# Patient Record
Sex: FEMALE | Race: BLACK OR AFRICAN AMERICAN | Employment: UNEMPLOYED | URBAN - METROPOLITAN AREA
[De-identification: names, ages, dates, MRNs, and addresses within clinical notes are randomized per-mention and may not be internally consistent; named-entity substitution may affect disease eponyms.]

---

## 2017-10-25 ENCOUNTER — OFFICE VISIT (OUTPATIENT)
Dept: PULMONOLOGY | Age: 52
End: 2017-10-25

## 2017-10-25 VITALS
TEMPERATURE: 97.5 F | OXYGEN SATURATION: 92 % | HEIGHT: 62 IN | SYSTOLIC BLOOD PRESSURE: 118 MMHG | BODY MASS INDEX: 45.82 KG/M2 | WEIGHT: 249 LBS | HEART RATE: 93 BPM | DIASTOLIC BLOOD PRESSURE: 70 MMHG

## 2017-10-25 DIAGNOSIS — Z72.0 TOBACCO ABUSE: ICD-10-CM

## 2017-10-25 DIAGNOSIS — E66.01 MORBID OBESITY (HCC): ICD-10-CM

## 2017-10-25 DIAGNOSIS — Z99.89 OSA ON CPAP: ICD-10-CM

## 2017-10-25 DIAGNOSIS — G47.33 OSA ON CPAP: ICD-10-CM

## 2017-10-25 DIAGNOSIS — J44.9 CHRONIC OBSTRUCTIVE PULMONARY DISEASE, UNSPECIFIED COPD TYPE (HCC): Primary | ICD-10-CM

## 2017-10-25 PROBLEM — G47.10 HYPERSOMNIA: Status: ACTIVE | Noted: 2017-10-25

## 2017-10-25 PROCEDURE — G8484 FLU IMMUNIZE NO ADMIN: HCPCS | Performed by: INTERNAL MEDICINE

## 2017-10-25 PROCEDURE — G8427 DOCREV CUR MEDS BY ELIG CLIN: HCPCS | Performed by: INTERNAL MEDICINE

## 2017-10-25 PROCEDURE — 4004F PT TOBACCO SCREEN RCVD TLK: CPT | Performed by: INTERNAL MEDICINE

## 2017-10-25 PROCEDURE — G8926 SPIRO NO PERF OR DOC: HCPCS | Performed by: INTERNAL MEDICINE

## 2017-10-25 PROCEDURE — 3023F SPIROM DOC REV: CPT | Performed by: INTERNAL MEDICINE

## 2017-10-25 PROCEDURE — G8417 CALC BMI ABV UP PARAM F/U: HCPCS | Performed by: INTERNAL MEDICINE

## 2017-10-25 PROCEDURE — 3014F SCREEN MAMMO DOC REV: CPT | Performed by: INTERNAL MEDICINE

## 2017-10-25 PROCEDURE — 3017F COLORECTAL CA SCREEN DOC REV: CPT | Performed by: INTERNAL MEDICINE

## 2017-10-25 PROCEDURE — 99214 OFFICE O/P EST MOD 30 MIN: CPT | Performed by: INTERNAL MEDICINE

## 2017-10-25 RX ORDER — AZITHROMYCIN 250 MG/1
TABLET, FILM COATED ORAL
COMMUNITY
Start: 2017-10-24 | End: 2018-02-28

## 2017-10-25 RX ORDER — MELATONIN
COMMUNITY
Start: 2017-10-10

## 2017-10-25 ASSESSMENT — ENCOUNTER SYMPTOMS
NAUSEA: 0
VOMITING: 0
WHEEZING: 1
CHEST TIGHTNESS: 0
SHORTNESS OF BREATH: 1
EYE DISCHARGE: 0
RHINORRHEA: 0
TROUBLE SWALLOWING: 0
EYE ITCHING: 0
ABDOMINAL PAIN: 0
SINUS PRESSURE: 0
DIARRHEA: 0
SORE THROAT: 0
VOICE CHANGE: 0
COUGH: 1

## 2017-10-25 NOTE — PROGRESS NOTES
Subjective:     Lucho Servin is a 46 y.o. female who complains today of:     Chief Complaint   Patient presents with    Follow-up     copd lewis       HPI  Patient is using CPAP with  13    centimeters of H2O with heated humidity. with 2.5 lit O2. Patient is using CPAP for about    5-7  a hours every night. Patient is using CPAP with full face   mask  Patient said  sleep is restful with the CPAP use. No snoring with CPAP use  No early morning headache. No complaint of daytime sleepiness or tiredness  Patient denies taking naps. Patient denies difficulty falling asleep or staying asleep. Patient is using proair HFA 2 puff QID prn   C/o shortness of breath , worse with exertion. Occasional Wheezing   C/o  Cough with  Dark yellow Sputum x2 days. Seen by PCP by [facility] and send for antibiotics. No Hemoptysis  No Chest pain or pleuritic pain  No Fever or chills. C/o Rhinorrhea or postnasal drip. Allergies:  Pcn [penicillins] and Sulfa antibiotics  Past Medical History:   Diagnosis Date    Depression     Depression     GERD (gastroesophageal reflux disease)     Hypercholesteremia     Hypertension     Hypothyroidism     LEWIS on CPAP      Past Surgical History:   Procedure Laterality Date    CARDIAC CATHETERIZATION      CHOLECYSTECTOMY      CYST REMOVAL      JOINT REPLACEMENT      KNEE CARTILAGE SURGERY      TUBAL LIGATION       Family History   Problem Relation Age of Onset    Heart Failure Mother      Social History     Social History    Marital status:      Spouse name: N/A    Number of children: N/A    Years of education: N/A     Occupational History    Not on file.      Social History Main Topics    Smoking status: Current Every Day Smoker     Packs/day: 0.50     Years: 30.00    Smokeless tobacco: Never Used    Alcohol use Not on file    Drug use: No    Sexual activity: Yes     Other Topics Concern    Not on file     Social History Narrative    No narrative on file tenderness. There is no rebound. Musculoskeletal: Normal range of motion. She exhibits no edema. Lymphadenopathy:     She has no cervical adenopathy. Neurological: She is alert and oriented to person, place, and time. Coordination normal.   Skin: Skin is warm and dry. She is not diaphoretic. Psychiatric: She has a normal mood and affect. Current Outpatient Prescriptions   Medication Sig Dispense Refill    Cholecalciferol (VITAMIN D3) 1000 units TABS       azithromycin (ZITHROMAX) 250 MG tablet       metFORMIN (GLUCOPHAGE) 500 MG tablet       CPAP Machine MISC by Does not apply route      OXYGEN Inhale into the lungs      triamcinolone (KENALOG) 0.1 % ointment Apply  topically 2 times daily. Apply topically 2 times daily.  ammonium lactate (AMLACTIN) 12 % cream Apply  topically as needed. Apply topically as needed.  omeprazole (PRILOSEC) 20 MG capsule Take 20 mg by mouth daily.  meloxicam (MOBIC) 7.5 MG tablet Take 7.5 mg by mouth daily.  buPROPion (WELLBUTRIN XL) 300 MG XL tablet Take 300 mg by mouth every morning.  cyclobenzaprine (FLEXERIL) 5 MG tablet Take 5 mg by mouth 3 times daily as needed.  lisinopril-hydrochlorothiazide (ZESTORETIC) 20-12.5 MG per tablet Take 1 tablet by mouth daily.  aripiprazole (ABILIFY) 10 MG tablet Take 10 mg by mouth daily.  carvedilol (COREG) 25 MG tablet Take 25 mg by mouth 2 times daily (with meals).  diltiazem (CARDIZEM SR) 120 MG SR capsule Take 120 mg by mouth 2 times daily.  ibuprofen (ADVIL;MOTRIN) 800 MG tablet Take 800 mg by mouth every 6 hours as needed.  lansoprazole (PREVACID) 30 MG capsule Take 30 mg by mouth daily.  levothyroxine (SYNTHROID) 200 MCG injection Inject 50 mcg into the vein every morning (before breakfast).  lisinopril (PRINIVIL;ZESTRIL) 20 MG tablet Take 20 mg by mouth daily.  sertraline (ZOLOFT) 100 MG tablet Take 100 mg by mouth daily.         potassium chloride SA (K-DUR;KLOR-CON) 20 MEQ tablet Take 20 mEq by mouth 2 times daily.  oxybutynin (DITROPAN) 5 MG tablet Take 5 mg by mouth 3 times daily.  sertraline (ZOLOFT) 100 MG tablet Take 100 mg by mouth daily.  simvastatin (ZOCOR) 20 MG tablet Take 20 mg by mouth nightly. No current facility-administered medications for this visit. Assessment/Plan:     1. ALMA on CPAP  Patient is using CPAP with  13    centimeters of H2O with heated humidity. with 2.5 lit O2. Patient is using CPAP for about    5-7  a hours every night. Patient is using CPAP with full face   mask  Patient said  sleep is restful with the CPAP use. continue CPAP and O2 as before. Counseling: CPAP/BiPAP uses, patient advised to use CPAP at least 5-6 hours every night. Sleep hygiene: He avoid supine sleep, sleep on her sides. Avoid  sleep deprivation. Explained sleep hygiene. Advice to avoid Alcohol and sedative      2. Chronic obstructive pulmonary disease, unspecified COPD type ([address]  She  Is on proair HFA 2 puff QID prn , CXR and PFT requested for further evaluation. - XR CHEST STANDARD (2 VW); Future  - FULL PFT STUDY WITH PRE AND POST; Future    3. Tobacco abuse  Patient advised try to quit smoking. Risks related to smoking explained to the patient. 4. Morbid obesity ([address]  Patient patient is advised try to lose weight. obesity related risk explained to the patient ,  Current weight:  249 lb (112.9 kg) Lbs. BMI:  Body mass index is 45.54 kg/m². Return in about 3 months (around 1/25/2018) for COPD, CPAP f/u, hypoxia on O2.       Ryan Hill MD

## 2017-12-20 ENCOUNTER — HOSPITAL ENCOUNTER (OUTPATIENT)
Dept: GENERAL RADIOLOGY | Age: 52
Discharge: HOME | End: 2017-12-20
Payer: MEDICAID

## 2017-12-20 ENCOUNTER — HOSPITAL ENCOUNTER (OUTPATIENT)
Dept: PULMONOLOGY | Age: 52
Discharge: HOME | End: 2017-12-20
Payer: MEDICAID

## 2017-12-20 DIAGNOSIS — J44.9 CHRONIC OBSTRUCTIVE PULMONARY DISEASE, UNSPECIFIED COPD TYPE (HCC): ICD-10-CM

## 2017-12-20 PROCEDURE — 94010 BREATHING CAPACITY TEST: CPT

## 2017-12-20 PROCEDURE — 71020 XR CHEST STANDARD TWO VW: CPT

## 2017-12-20 PROCEDURE — 94726 PLETHYSMOGRAPHY LUNG VOLUMES: CPT

## 2017-12-20 PROCEDURE — 94729 DIFFUSING CAPACITY: CPT

## 2017-12-21 PROCEDURE — 94010 BREATHING CAPACITY TEST: CPT | Performed by: INTERNAL MEDICINE

## 2017-12-21 PROCEDURE — 94729 DIFFUSING CAPACITY: CPT | Performed by: INTERNAL MEDICINE

## 2017-12-21 PROCEDURE — 94726 PLETHYSMOGRAPHY LUNG VOLUMES: CPT | Performed by: INTERNAL MEDICINE

## 2018-02-28 ENCOUNTER — OFFICE VISIT (OUTPATIENT)
Dept: PULMONOLOGY | Age: 53
End: 2018-02-28
Payer: MEDICAID

## 2018-02-28 VITALS
SYSTOLIC BLOOD PRESSURE: 120 MMHG | HEIGHT: 62 IN | TEMPERATURE: 97.9 F | BODY MASS INDEX: 48.69 KG/M2 | HEART RATE: 67 BPM | DIASTOLIC BLOOD PRESSURE: 62 MMHG | OXYGEN SATURATION: 95 % | WEIGHT: 264.6 LBS

## 2018-02-28 DIAGNOSIS — G47.33 OSA ON CPAP: Primary | ICD-10-CM

## 2018-02-28 DIAGNOSIS — E66.01 MORBID OBESITY (HCC): ICD-10-CM

## 2018-02-28 DIAGNOSIS — K21.9 GASTROESOPHAGEAL REFLUX DISEASE WITHOUT ESOPHAGITIS: ICD-10-CM

## 2018-02-28 DIAGNOSIS — Z99.89 OSA ON CPAP: Primary | ICD-10-CM

## 2018-02-28 DIAGNOSIS — R09.02 HYPOXEMIA: ICD-10-CM

## 2018-02-28 DIAGNOSIS — J44.9 CHRONIC OBSTRUCTIVE PULMONARY DISEASE, UNSPECIFIED COPD TYPE (HCC): ICD-10-CM

## 2018-02-28 PROCEDURE — 4004F PT TOBACCO SCREEN RCVD TLK: CPT | Performed by: INTERNAL MEDICINE

## 2018-02-28 PROCEDURE — G8484 FLU IMMUNIZE NO ADMIN: HCPCS | Performed by: INTERNAL MEDICINE

## 2018-02-28 PROCEDURE — G8427 DOCREV CUR MEDS BY ELIG CLIN: HCPCS | Performed by: INTERNAL MEDICINE

## 2018-02-28 PROCEDURE — G8417 CALC BMI ABV UP PARAM F/U: HCPCS | Performed by: INTERNAL MEDICINE

## 2018-02-28 PROCEDURE — G8926 SPIRO NO PERF OR DOC: HCPCS | Performed by: INTERNAL MEDICINE

## 2018-02-28 PROCEDURE — 3017F COLORECTAL CA SCREEN DOC REV: CPT | Performed by: INTERNAL MEDICINE

## 2018-02-28 PROCEDURE — 99214 OFFICE O/P EST MOD 30 MIN: CPT | Performed by: INTERNAL MEDICINE

## 2018-02-28 PROCEDURE — 3023F SPIROM DOC REV: CPT | Performed by: INTERNAL MEDICINE

## 2018-02-28 PROCEDURE — 3014F SCREEN MAMMO DOC REV: CPT | Performed by: INTERNAL MEDICINE

## 2018-02-28 ASSESSMENT — ENCOUNTER SYMPTOMS
EYE ITCHING: 0
VOMITING: 0
EYE DISCHARGE: 0
RHINORRHEA: 0
ABDOMINAL PAIN: 0
SHORTNESS OF BREATH: 1
WHEEZING: 1
CHEST TIGHTNESS: 0
DIARRHEA: 0
SINUS PRESSURE: 0
SORE THROAT: 0
NAUSEA: 0
TROUBLE SWALLOWING: 0
COUGH: 0
VOICE CHANGE: 0

## 2018-04-02 ENCOUNTER — HOSPITAL ENCOUNTER (OUTPATIENT)
Dept: SLEEP CENTER | Age: 53
Discharge: HOME | End: 2018-04-04
Payer: MEDICAID

## 2018-04-02 PROCEDURE — 95811 POLYSOM 6/>YRS CPAP 4/> PARM: CPT

## 2018-06-26 ENCOUNTER — OFFICE VISIT (OUTPATIENT)
Dept: PULMONOLOGY | Age: 53
End: 2018-06-26
Payer: MEDICAID

## 2018-06-26 VITALS
HEIGHT: 62 IN | SYSTOLIC BLOOD PRESSURE: 124 MMHG | BODY MASS INDEX: 49.8 KG/M2 | WEIGHT: 270.6 LBS | TEMPERATURE: 97 F | OXYGEN SATURATION: 98 % | HEART RATE: 70 BPM | DIASTOLIC BLOOD PRESSURE: 64 MMHG

## 2018-06-26 DIAGNOSIS — J44.9 CHRONIC OBSTRUCTIVE PULMONARY DISEASE, UNSPECIFIED COPD TYPE (HCC): ICD-10-CM

## 2018-06-26 DIAGNOSIS — R09.02 HYPOXIA: ICD-10-CM

## 2018-06-26 DIAGNOSIS — G47.33 OSA ON CPAP: Primary | ICD-10-CM

## 2018-06-26 DIAGNOSIS — Z99.89 OSA ON CPAP: Primary | ICD-10-CM

## 2018-06-26 DIAGNOSIS — E66.01 MORBID OBESITY (HCC): ICD-10-CM

## 2018-06-26 PROCEDURE — G8427 DOCREV CUR MEDS BY ELIG CLIN: HCPCS | Performed by: INTERNAL MEDICINE

## 2018-06-26 PROCEDURE — G8417 CALC BMI ABV UP PARAM F/U: HCPCS | Performed by: INTERNAL MEDICINE

## 2018-06-26 PROCEDURE — 3023F SPIROM DOC REV: CPT | Performed by: INTERNAL MEDICINE

## 2018-06-26 PROCEDURE — 3017F COLORECTAL CA SCREEN DOC REV: CPT | Performed by: INTERNAL MEDICINE

## 2018-06-26 PROCEDURE — 4004F PT TOBACCO SCREEN RCVD TLK: CPT | Performed by: INTERNAL MEDICINE

## 2018-06-26 PROCEDURE — G8926 SPIRO NO PERF OR DOC: HCPCS | Performed by: INTERNAL MEDICINE

## 2018-06-26 PROCEDURE — 99214 OFFICE O/P EST MOD 30 MIN: CPT | Performed by: INTERNAL MEDICINE

## 2018-06-26 ASSESSMENT — ENCOUNTER SYMPTOMS
NAUSEA: 0
DIARRHEA: 0
VOICE CHANGE: 0
RHINORRHEA: 0
WHEEZING: 0
VOMITING: 0
COUGH: 0
SORE THROAT: 0
CHEST TIGHTNESS: 0
ABDOMINAL PAIN: 0
SINUS PRESSURE: 0
SHORTNESS OF BREATH: 0
EYE DISCHARGE: 0
TROUBLE SWALLOWING: 0
EYE ITCHING: 0

## 2018-08-09 ENCOUNTER — TELEPHONE (OUTPATIENT)
Dept: PULMONOLOGY | Age: 53
End: 2018-08-09

## 2018-08-09 NOTE — TELEPHONE ENCOUNTER
PATIENT IS REQUESTING A SCRIPT BE SENT TO [facility], [place] FOR HEATED TUBING FOR HER CPAP MACHINE. PATIENT STATES NO MATTER WHAT SETTING HER CPAP IS ON SHE WAKES UP WITH A VERY DRY NOSE.

## 2018-12-20 ENCOUNTER — OFFICE VISIT (OUTPATIENT)
Dept: PULMONOLOGY | Age: 53
End: 2018-12-20
Payer: MEDICAID

## 2018-12-20 VITALS
DIASTOLIC BLOOD PRESSURE: 60 MMHG | BODY MASS INDEX: 49.13 KG/M2 | OXYGEN SATURATION: 93 % | SYSTOLIC BLOOD PRESSURE: 128 MMHG | TEMPERATURE: 97.4 F | WEIGHT: 267 LBS | HEIGHT: 62 IN | RESPIRATION RATE: 16 BRPM | HEART RATE: 73 BPM

## 2018-12-20 DIAGNOSIS — G47.33 OSA ON CPAP: Primary | ICD-10-CM

## 2018-12-20 DIAGNOSIS — Z99.89 OSA ON CPAP: Primary | ICD-10-CM

## 2018-12-20 DIAGNOSIS — E66.01 MORBID OBESITY (HCC): ICD-10-CM

## 2018-12-20 DIAGNOSIS — J44.9 CHRONIC OBSTRUCTIVE PULMONARY DISEASE, UNSPECIFIED COPD TYPE (HCC): ICD-10-CM

## 2018-12-20 DIAGNOSIS — F17.200 SMOKING: ICD-10-CM

## 2018-12-20 PROCEDURE — 3017F COLORECTAL CA SCREEN DOC REV: CPT | Performed by: INTERNAL MEDICINE

## 2018-12-20 PROCEDURE — G8417 CALC BMI ABV UP PARAM F/U: HCPCS | Performed by: INTERNAL MEDICINE

## 2018-12-20 PROCEDURE — G8484 FLU IMMUNIZE NO ADMIN: HCPCS | Performed by: INTERNAL MEDICINE

## 2018-12-20 PROCEDURE — G8926 SPIRO NO PERF OR DOC: HCPCS | Performed by: INTERNAL MEDICINE

## 2018-12-20 PROCEDURE — 4004F PT TOBACCO SCREEN RCVD TLK: CPT | Performed by: INTERNAL MEDICINE

## 2018-12-20 PROCEDURE — 99214 OFFICE O/P EST MOD 30 MIN: CPT | Performed by: INTERNAL MEDICINE

## 2018-12-20 PROCEDURE — 3023F SPIROM DOC REV: CPT | Performed by: INTERNAL MEDICINE

## 2018-12-20 PROCEDURE — G8427 DOCREV CUR MEDS BY ELIG CLIN: HCPCS | Performed by: INTERNAL MEDICINE

## 2018-12-20 RX ORDER — ALBUTEROL SULFATE 90 UG/1
2 AEROSOL, METERED RESPIRATORY (INHALATION) EVERY 6 HOURS PRN
Qty: 1 INHALER | Refills: 3 | Status: SHIPPED | OUTPATIENT
Start: 2018-12-20 | End: 2020-01-09 | Stop reason: SDUPTHER

## 2018-12-20 ASSESSMENT — ENCOUNTER SYMPTOMS
COUGH: 0
NAUSEA: 0
DIARRHEA: 0
CHEST TIGHTNESS: 0
ABDOMINAL PAIN: 0
SINUS PRESSURE: 0
WHEEZING: 1
TROUBLE SWALLOWING: 0
SORE THROAT: 0
RHINORRHEA: 0
VOMITING: 0
EYE ITCHING: 0
VOICE CHANGE: 0
EYE DISCHARGE: 0
SHORTNESS OF BREATH: 1

## 2019-04-19 ENCOUNTER — TELEPHONE (OUTPATIENT)
Dept: PULMONOLOGY | Age: 54
End: 2019-04-19

## 2019-06-17 ENCOUNTER — OFFICE VISIT (OUTPATIENT)
Dept: PULMONOLOGY | Age: 54
End: 2019-06-17
Payer: MEDICAID

## 2019-06-17 ENCOUNTER — TELEPHONE (OUTPATIENT)
Dept: PULMONOLOGY | Age: 54
End: 2019-06-17

## 2019-06-17 VITALS
BODY MASS INDEX: 49.69 KG/M2 | HEART RATE: 71 BPM | RESPIRATION RATE: 16 BRPM | TEMPERATURE: 96.2 F | HEIGHT: 62 IN | DIASTOLIC BLOOD PRESSURE: 64 MMHG | SYSTOLIC BLOOD PRESSURE: 126 MMHG | OXYGEN SATURATION: 93 % | WEIGHT: 270 LBS

## 2019-06-17 DIAGNOSIS — F17.200 SMOKING: ICD-10-CM

## 2019-06-17 DIAGNOSIS — R09.02 HYPOXIA: ICD-10-CM

## 2019-06-17 DIAGNOSIS — E66.01 MORBID OBESITY (HCC): ICD-10-CM

## 2019-06-17 DIAGNOSIS — Z99.89 OSA ON CPAP: Primary | ICD-10-CM

## 2019-06-17 DIAGNOSIS — G47.33 OSA ON CPAP: Primary | ICD-10-CM

## 2019-06-17 PROCEDURE — G8427 DOCREV CUR MEDS BY ELIG CLIN: HCPCS | Performed by: INTERNAL MEDICINE

## 2019-06-17 PROCEDURE — 3017F COLORECTAL CA SCREEN DOC REV: CPT | Performed by: INTERNAL MEDICINE

## 2019-06-17 PROCEDURE — G8417 CALC BMI ABV UP PARAM F/U: HCPCS | Performed by: INTERNAL MEDICINE

## 2019-06-17 PROCEDURE — 4004F PT TOBACCO SCREEN RCVD TLK: CPT | Performed by: INTERNAL MEDICINE

## 2019-06-17 PROCEDURE — 99214 OFFICE O/P EST MOD 30 MIN: CPT | Performed by: INTERNAL MEDICINE

## 2019-06-17 RX ORDER — LISINOPRIL 20 MG/1
TABLET ORAL
COMMUNITY
Start: 2019-06-01

## 2019-06-17 RX ORDER — ASPIRIN 81 MG/1
TABLET, CHEWABLE ORAL
COMMUNITY
Start: 2019-05-28

## 2019-06-17 ASSESSMENT — ENCOUNTER SYMPTOMS
ABDOMINAL PAIN: 0
WHEEZING: 1
COUGH: 0
CHEST TIGHTNESS: 0
SHORTNESS OF BREATH: 1
SINUS PRESSURE: 0
VOICE CHANGE: 0
NAUSEA: 0
RHINORRHEA: 0
VOMITING: 0
DIARRHEA: 0
TROUBLE SWALLOWING: 0
EYE ITCHING: 0
SORE THROAT: 0
EYE DISCHARGE: 0

## 2019-06-17 NOTE — PROGRESS NOTES
Subjective:     Flavia Stephens is a 47 y.o. female who complains today of:     Chief Complaint   Patient presents with    Follow-up     f/u for ALMA,COPD       HPI  Patient is using CPAP with 10   centimeters of H2O with heated humidity. Patient is using CPAP for about   6 hours every night. Patient is using CPAP with   Full face Mask. Patient said  sleep is restful with the CPAP use. Patient is compliant with CPAP therapy and benefiting with CPAP use. No snoring with CPAP use. No early morning headache. No complaint of daytime sleepiness or tiredness with CPAP use. Patient denies taking naps. No sleepiness with driving. Patient denies difficulty falling asleep or staying asleep. C/o shortness of breath , worse with exertion. No Wheezing   No Cough or   Sputum  No Hemoptysis  No Chest tightness   No Chest pain with radiation  or pleuritic pain  No Fever or chills. No Rhinorrhea and postnasal drip.   She is is smoking  1/2 PPD    Allergies:  Pcn [penicillins] and Sulfa antibiotics  Past Medical History:   Diagnosis Date    Depression     Depression     GERD (gastroesophageal reflux disease)     Hypercholesteremia     Hypertension     Hypothyroidism     Lung disease     ALMA on CPAP      Past Surgical History:   Procedure Laterality Date    CARDIAC CATHETERIZATION      CHOLECYSTECTOMY      CYST REMOVAL      JOINT REPLACEMENT      KNEE CARTILAGE SURGERY      TUBAL LIGATION       Family History   Problem Relation Age of Onset    Heart Failure Mother      Social History     Socioeconomic History    Marital status:      Spouse name: Not on file    Number of children: Not on file    Years of education: Not on file    Highest education level: Not on file   Occupational History    Not on file   Social Needs    Financial resource strain: Not on file    Food insecurity:     Worry: Not on file     Inability: Not on file    Transportation needs:     Medical: Not on file Non-medical: Not on file   Tobacco Use    Smoking status: Current Every Day Smoker     Packs/day: 0.50     Years: 30.00     Pack years: 15.00    Smokeless tobacco: Never Used   Substance and Sexual Activity    Alcohol use: Not on file    Drug use: No    Sexual activity: Yes   Lifestyle    Physical activity:     Days per week: Not on file     Minutes per session: Not on file    Stress: Not on file   Relationships    Social connections:     Talks on phone: Not on file     Gets together: Not on file     Attends Anabaptist service: Not on file     Active member of club or organization: Not on file     Attends meetings of clubs or organizations: Not on file     Relationship status: Not on file    Intimate partner violence:     Fear of current or ex partner: Not on file     Emotionally abused: Not on file     Physically abused: Not on file     Forced sexual activity: Not on file   Other Topics Concern    Not on file   Social History Narrative    Not on file         Review of Systems   Constitutional: Negative for chills, diaphoresis, fatigue and fever. HENT: Negative for congestion, mouth sores, nosebleeds, postnasal drip, rhinorrhea, sinus pressure, sneezing, sore throat, trouble swallowing and voice change. Eyes: Negative for discharge, itching and visual disturbance. Respiratory: Positive for shortness of breath and wheezing. Negative for cough and chest tightness. Cardiovascular: Negative for chest pain, palpitations and leg swelling. Gastrointestinal: Negative for abdominal pain, diarrhea, nausea and vomiting. Genitourinary: Negative for difficulty urinating and hematuria. Musculoskeletal: Negative for arthralgias, joint swelling and myalgias. Skin: Negative for rash. Allergic/Immunologic: Negative for environmental allergies and food allergies. Neurological: Negative for dizziness, tremors, syncope, weakness and headaches. Psychiatric/Behavioral: Positive for sleep disturbance. Negative for behavioral problems and suicidal ideas. Objective:     Vitals:    06/17/19 1138   BP: 126/64   Pulse: 71   Resp: 16   Temp: 96.2 °F (35.7 °C)   TempSrc: Tympanic   SpO2: 93%   Weight: 270 lb (122.5 kg)   Height: 5' 2\" (1.575 m)         Physical Exam   Constitutional: She is oriented to person, place, and time. She appears well-developed and well-nourished. No distress. HENT:   Head: Normocephalic and atraumatic. Nose: Nose normal.   Mouth/Throat: Oropharynx is clear and moist.   Eyes: Pupils are equal, round, and reactive to light. EOM are normal.   Neck: No JVD present. No tracheal deviation present. No thyromegaly present. Cardiovascular: Normal rate and regular rhythm. Exam reveals no gallop and no friction rub. No murmur heard. Pulmonary/Chest: No respiratory distress. She has no wheezes. She has no rales. She exhibits no tenderness. Abdominal: She exhibits no distension. There is no tenderness. There is no rebound. Musculoskeletal: Normal range of motion. She exhibits no edema. Lymphadenopathy:     She has no cervical adenopathy. Neurological: She is alert and oriented to person, place, and time. Coordination normal.   Skin: Skin is warm and dry. She is not diaphoretic. Psychiatric: She has a normal mood and affect.        Current Outpatient Medications   Medication Sig Dispense Refill    OXYGEN Portable O2 concentrator 2.5 lit 1 Units 0    OXYGEN New oxygen concentrator 2.5 lit with sleep 1 Units 0    albuterol sulfate HFA (PROVENTIL HFA) 108 (90 Base) MCG/ACT inhaler Inhale 2 puffs into the lungs every 6 hours as needed for Wheezing 1 Inhaler 3    Respiratory Therapy Supplies [employer] Heated tubing for CPAP #1 1 Device 0    Respiratory Therapy Supplies [employer] Change CPAP pressure 10 cm and New CPAP mask and supplies 1 Device 0    CPAP Machine MISC by Does not apply route ADJUST CPAP SETTING TO 10 CM H2O 1 each 0    VORTIoxetine (TRINTELLIX) 10 MG TABS tablet Take 10 mg by mouth daily      Cholecalciferol (VITAMIN D3) 1000 units TABS       metFORMIN (GLUCOPHAGE) 500 MG tablet       CPAP Machine MISC by Does not apply route      OXYGEN Inhale into the lungs      omeprazole (PRILOSEC) 20 MG capsule Take 20 mg by mouth daily.  buPROPion (WELLBUTRIN XL) 300 MG XL tablet Take 300 mg by mouth every morning.  carvedilol (COREG) 25 MG tablet Take 25 mg by mouth 2 times daily (with meals).  diltiazem (CARDIZEM SR) 120 MG SR capsule Take 120 mg by mouth 2 times daily.  ibuprofen (ADVIL;MOTRIN) 800 MG tablet Take 800 mg by mouth every 6 hours as needed.  lansoprazole (PREVACID) 30 MG capsule Take 30 mg by mouth daily.  levothyroxine (SYNTHROID) 200 MCG injection Inject 50 mcg into the vein every morning (before breakfast).  potassium chloride SA (K-DUR;KLOR-CON) 20 MEQ tablet Take 20 mEq by mouth 2 times daily.  oxybutynin (DITROPAN) 5 MG tablet Take 5 mg by mouth 3 times daily.  simvastatin (ZOCOR) 20 MG tablet Take 20 mg by mouth nightly.  lisinopril (PRINIVIL;ZESTRIL) 20 MG tablet       aspirin 81 MG chewable tablet        No current facility-administered medications for this visit. Results for orders placed during the hospital encounter of 12/20/17   XR CHEST STANDARD (2 VW)    Narrative EXAMINATION: CHEST TWO VIEWS     CLINICAL HISTORY: J44.9 Chronic obstructive pulmonary disease, unspecified COPD type ([address] ICD10, follow-up    COMPARISONS: August 6, 2012     FINDINGS:    Two views of the chest are submitted. The cardiac silhouette is of normal size configuration. The mediastinum is unremarkable. Pulmonary vascular is attenuated, lungs are hyperinflated and there is some widening of the AP diameter the chest and coarsening of the interstitium. Right sided trachea. No focal infiltrates. No effusions. Pneumothoraces. Impression NO ACUTE ACTIVE CARDIOPULMONARY PROCESS. RADIOGRAPHIC FINDINGS SUGGESTIVE OF COPD. CORRELATE CLINICALLY. Assessment/Plan:     1. ALMA on CPAP  Patient is using CPAP with 10   centimeters of H2O with heated humidity 2-1/2 L O2 is. She  is using CPAP for about   6 hours every night. She is using CPAP with   Full face Mask. She  said  sleep is restful with the CPAP use. She  is compliant with CPAP therapy and benefiting with CPAP use. No snoring with CPAP use. Continue CPAP therapy as before    Counseling: CPAP/BiPAP uses, patient advised to use CPAP at least 5-6 hours every night. Driving: patient is advised for extreme caution when driving or operating machinery if there is a feeling of drowsiness, especially while driving it is preferable to stop driving and take a brief nap. Sleep hygiene:Avoid supine sleep, sleep on  sides. Avoid  sleep deprivation. Explained sleep hygiene. Advice to avoid Alcohol and sedative    2. Morbid obesity ([address]  Patient patient is advised try to lose weight. obesity related risk explained to the patient ,  Current weight:  270 lb (122.5 kg) Lbs. BMI:  Body mass index is 49.38 kg/m². 3. Smoking  Patient advised try to quit smoking. Risks related to smoking explained to the patient. Different ways to help him quit smoking were discussed today. 4. Hypoxia  2.5 L O2 with CPAP therapy during sleep. She is not using oxygen during daytime    Return in about 4 months (around 10/17/2019) for alma, hypoxia on O2.       Marii Nielson MD

## 2019-07-08 ENCOUNTER — TELEPHONE (OUTPATIENT)
Dept: PULMONOLOGY | Age: 54
End: 2019-07-08

## 2020-01-09 ENCOUNTER — OFFICE VISIT (OUTPATIENT)
Dept: PULMONOLOGY | Age: 55
End: 2020-01-09
Payer: MEDICAID

## 2020-01-09 VITALS
DIASTOLIC BLOOD PRESSURE: 74 MMHG | RESPIRATION RATE: 16 BRPM | SYSTOLIC BLOOD PRESSURE: 130 MMHG | WEIGHT: 275 LBS | HEIGHT: 62 IN | BODY MASS INDEX: 50.61 KG/M2 | OXYGEN SATURATION: 94 % | HEART RATE: 75 BPM

## 2020-01-09 PROCEDURE — 99214 OFFICE O/P EST MOD 30 MIN: CPT | Performed by: INTERNAL MEDICINE

## 2020-01-09 PROCEDURE — 4004F PT TOBACCO SCREEN RCVD TLK: CPT | Performed by: INTERNAL MEDICINE

## 2020-01-09 PROCEDURE — 3017F COLORECTAL CA SCREEN DOC REV: CPT | Performed by: INTERNAL MEDICINE

## 2020-01-09 PROCEDURE — G8427 DOCREV CUR MEDS BY ELIG CLIN: HCPCS | Performed by: INTERNAL MEDICINE

## 2020-01-09 PROCEDURE — G8484 FLU IMMUNIZE NO ADMIN: HCPCS | Performed by: INTERNAL MEDICINE

## 2020-01-09 PROCEDURE — G8417 CALC BMI ABV UP PARAM F/U: HCPCS | Performed by: INTERNAL MEDICINE

## 2020-01-09 RX ORDER — LEVOTHYROXINE SODIUM 0.12 MG/1
TABLET ORAL
COMMUNITY
Start: 2019-10-26

## 2020-01-09 RX ORDER — ALBUTEROL SULFATE 90 UG/1
2 AEROSOL, METERED RESPIRATORY (INHALATION) EVERY 6 HOURS PRN
Qty: 1 INHALER | Refills: 3 | Status: SHIPPED | OUTPATIENT
Start: 2020-01-09 | End: 2021-02-25 | Stop reason: SDUPTHER

## 2020-01-09 ASSESSMENT — ENCOUNTER SYMPTOMS
CHEST TIGHTNESS: 0
EYE ITCHING: 0
SINUS PRESSURE: 0
COUGH: 0
SHORTNESS OF BREATH: 1
DIARRHEA: 0
RHINORRHEA: 0
ABDOMINAL PAIN: 0
EYE DISCHARGE: 0
VOMITING: 0
TROUBLE SWALLOWING: 0
WHEEZING: 1
VOICE CHANGE: 0
NAUSEA: 0
SORE THROAT: 0

## 2020-01-09 NOTE — PROGRESS NOTES
Subjective:     Rachele Billy is a 47 y.o. female who complains today of:     Chief Complaint   Patient presents with    Follow-up     four month f/u for ALMA. HPI  She is using CPAP with  10 centimeters of H2O with heated humidity with 2.5 lit  , . She is using CPAP for about  5-8 hours every night. She is using CPAP with  Full face  Mask. She said  sleep is restful with the CPAP use. She is compliant with CPAP therapy and benefiting with CPAP use. No snoring with CPAP use. No complaint of daytime sleepiness or tiredness with CPAP use. She denies taking naps. She denies difficulty falling asleep or staying asleep. Patient is smoking  1/2 PPD  She is on albuterol HFA 2 puff prn   C/o shortness of breath , worse with exertion. Occasional Wheezing  at night   No cough  No Hemoptysis  No Chest tightness   No Chest pain with radiation  or pleuritic pain  No Fever or chills. No Rhinorrhea and postnasal drip.     Allergies:  Pcn [penicillins] and Sulfa antibiotics  Past Medical History:   Diagnosis Date    Depression     Depression     GERD (gastroesophageal reflux disease)     Hypercholesteremia     Hypertension     Hypothyroidism     Lung disease     ALMA on CPAP      Past Surgical History:   Procedure Laterality Date    CARDIAC CATHETERIZATION      CHOLECYSTECTOMY      CYST REMOVAL      JOINT REPLACEMENT      KNEE CARTILAGE SURGERY      TUBAL LIGATION       Family History   Problem Relation Age of Onset    Heart Failure Mother      Social History     Socioeconomic History    Marital status:      Spouse name: Not on file    Number of children: Not on file    Years of education: Not on file    Highest education level: Not on file   Occupational History    Not on file   Social Needs    Financial resource strain: Not on file    Food insecurity:     Worry: Not on file     Inability: Not on file    Transportation needs:     Medical: Not on file     Non-medical: Not on file   Tobacco Use    Smoking status: Current Every Day Smoker     Packs/day: 0.50     Years: 30.00     Pack years: 15.00    Smokeless tobacco: Never Used   Substance and Sexual Activity    Alcohol use: Not on file    Drug use: No    Sexual activity: Yes   Lifestyle    Physical activity:     Days per week: Not on file     Minutes per session: Not on file    Stress: Not on file   Relationships    Social connections:     Talks on phone: Not on file     Gets together: Not on file     Attends [employer] service: Not on file     Active member of club or organization: Not on file     Attends meetings of clubs or organizations: Not on file     Relationship status: Not on file    Intimate partner violence:     Fear of current or ex partner: Not on file     Emotionally abused: Not on file     Physically abused: Not on file     Forced sexual activity: Not on file   Other Topics Concern    Not on file   Social History Narrative    Not on file         Review of Systems   Constitutional: Negative for chills, diaphoresis, fatigue and fever. HENT: Negative for congestion, mouth sores, nosebleeds, postnasal drip, rhinorrhea, sinus pressure, sneezing, sore throat, trouble swallowing and voice change. Eyes: Negative for discharge, itching and visual disturbance. Respiratory: Positive for shortness of breath and wheezing. Negative for cough and chest tightness. Cardiovascular: Negative for chest pain, palpitations and leg swelling. Gastrointestinal: Negative for abdominal pain, diarrhea, nausea and vomiting. Genitourinary: Negative for difficulty urinating and hematuria. Musculoskeletal: Negative for arthralgias, joint swelling and myalgias. Skin: Negative for rash. Allergic/Immunologic: Negative for environmental allergies and food allergies. Neurological: Negative for dizziness, tremors, weakness and headaches. Psychiatric/Behavioral: Positive for sleep disturbance. Negative for behavioral problems. infiltrates. No effusions. Pneumothoraces. Impression NO ACUTE ACTIVE CARDIOPULMONARY PROCESS. RADIOGRAPHIC FINDINGS SUGGESTIVE OF COPD. CORRELATE CLINICALLY. Assessment/Plan:     1. ALMA on CPAP  She is using CPAP with  10 centimeters of H2O with heated humidity with 2.5 lit  , She is using CPAP for about  5-8 hours every night. She is using CPAP with  Full face  Mask. She said  sleep is restful with the CPAP use. She is compliant with CPAP therapy and benefiting with CPAP use. No snoring with CPAP use. Continue CPAP therapy as before    Counseling: CPAP/BiPAP uses, patient advised to use CPAP at least 5-6 hours every night. Driving: patient is advised for extreme caution when driving or operating machinery if there is a feeling of drowsiness, especially while driving it is preferable to stop driving and take a brief nap. Sleep hygiene:Avoid supine sleep, sleep on  sides. Avoid  sleep deprivation. Explained sleep hygiene. Advice to avoid Alcohol and sedative    2. Morbid obesity ([address]  Patient patient is advised try to lose weight. obesity related risk explained to the patient ,  Current weight:  275 lb (124.7 kg) Lbs. BMI:  Body mass index is 50.3 kg/m². Suggested weight control approaches, including dietary changes , exercise, behavioral modification. 3. Smoking  Patient advised try to quit smoking. Risks related to smoking explained to the patient. Different ways to help him quit smoking were discussed today. 4. Hypoxia  She is using 2-1/2 L O2 during sleep with CPAP therapy. She is not using oxygen during daytime room air oxygen saturation 94%      Return in about 6 months (around 7/9/2020) for hypoxia on O2, alma, shortness of breath.       Yao Huang MD

## 2020-02-07 ENCOUNTER — TELEPHONE (OUTPATIENT)
Dept: PULMONOLOGY | Age: 55
End: 2020-02-07

## 2020-06-24 ENCOUNTER — OFFICE VISIT (OUTPATIENT)
Dept: PULMONOLOGY | Age: 55
End: 2020-06-24
Payer: MEDICAID

## 2020-06-24 VITALS
TEMPERATURE: 97.2 F | DIASTOLIC BLOOD PRESSURE: 70 MMHG | HEART RATE: 71 BPM | BODY MASS INDEX: 51.71 KG/M2 | RESPIRATION RATE: 16 BRPM | SYSTOLIC BLOOD PRESSURE: 124 MMHG | HEIGHT: 62 IN | WEIGHT: 281 LBS | OXYGEN SATURATION: 92 %

## 2020-06-24 PROCEDURE — 1036F TOBACCO NON-USER: CPT | Performed by: INTERNAL MEDICINE

## 2020-06-24 PROCEDURE — 3023F SPIROM DOC REV: CPT | Performed by: INTERNAL MEDICINE

## 2020-06-24 PROCEDURE — 3017F COLORECTAL CA SCREEN DOC REV: CPT | Performed by: INTERNAL MEDICINE

## 2020-06-24 PROCEDURE — G8427 DOCREV CUR MEDS BY ELIG CLIN: HCPCS | Performed by: INTERNAL MEDICINE

## 2020-06-24 PROCEDURE — G8926 SPIRO NO PERF OR DOC: HCPCS | Performed by: INTERNAL MEDICINE

## 2020-06-24 PROCEDURE — 99214 OFFICE O/P EST MOD 30 MIN: CPT | Performed by: INTERNAL MEDICINE

## 2020-06-24 PROCEDURE — G8417 CALC BMI ABV UP PARAM F/U: HCPCS | Performed by: INTERNAL MEDICINE

## 2020-06-24 ASSESSMENT — ENCOUNTER SYMPTOMS
EYE DISCHARGE: 0
SORE THROAT: 0
CHEST TIGHTNESS: 0
NAUSEA: 0
COUGH: 1
WHEEZING: 1
EYE ITCHING: 0
RHINORRHEA: 0
VOICE CHANGE: 0
SHORTNESS OF BREATH: 1
ABDOMINAL PAIN: 0
SINUS PRESSURE: 0
TROUBLE SWALLOWING: 0
DIARRHEA: 0
VOMITING: 0

## 2020-06-24 NOTE — PROGRESS NOTES
Subjective:     Robbie Doyle is a 54 y.o. female who complains today of:     Chief Complaint   Patient presents with    Follow-up     six month f/u for Hypoxia pn O2, SOB, and ALMA. HPI  Patient is blood tinge mucus since last 2-3 month , but No blood with mucus in last 3 weeks. She takes baby aspirin daily. .   She said she quit smoking 1/12/20    C/o shortness of breath , worse with exertion. Occasional Wheezing at night and activity . No Chest tightness   No Chest pain with radiation  or pleuritic pain at this time but sometime chest pain  Mid chest area without radiation   No Fever or chills. No Rhinorrhea and postnasal drip. She is on albuterol HFA 2 puff prn . She is using CPAP with   10 cm   of H2O with heated humidity and 2.5 lit O2 . She is using CPAP for about  5-8   hours every night. She is using CPAP with   Full face mask Mask. She said  sleep is restful with the CPAP use. She is compliant with CPAP therapy and benefiting with CPAP use. No snoring with CPAP use. No complaint of daytime sleepiness or tiredness with CPAP use. She denies taking naps. She denies difficulty falling asleep or staying asleep.           Allergies:  Pcn [penicillins] and Sulfa antibiotics  Past Medical History:   Diagnosis Date    Depression     Depression     GERD (gastroesophageal reflux disease)     Hypercholesteremia     Hypertension     Hypothyroidism     Lung disease     ALMA on CPAP      Past Surgical History:   Procedure Laterality Date    CARDIAC CATHETERIZATION      CHOLECYSTECTOMY      CYST REMOVAL      JOINT REPLACEMENT      KNEE CARTILAGE SURGERY      TUBAL LIGATION       Family History   Problem Relation Age of Onset    Heart Failure Mother      Social History     Socioeconomic History    Marital status:      Spouse name: Not on file    Number of children: Not on file    Years of education: Not on file    Highest education level: Not on file   Occupational myalgias. Skin: Negative for rash. Allergic/Immunologic: Negative for environmental allergies and food allergies. Neurological: Negative for dizziness, tremors, weakness and headaches. Psychiatric/Behavioral: Negative for behavioral problems and sleep disturbance.         :     Vitals:    06/24/20 0832   BP: 124/70   Pulse: 71   Resp: 16   Temp: 97.2 °F (36.2 °C)   TempSrc: Temporal   SpO2: 92%   Weight: 281 lb (127.5 kg)   Height: 5' 2\" (1.575 m)     Wt Readings from Last 3 Encounters:   06/24/20 281 lb (127.5 kg)   01/09/20 275 lb (124.7 kg)   06/17/19 270 lb (122.5 kg)         Physical Exam  Constitutional:       General: She is not in acute distress. Appearance: She is well-developed. She is obese. She is not diaphoretic. HENT:      Head: Normocephalic and atraumatic. Nose: Nose normal.   Eyes:      Pupils: Pupils are equal, round, and reactive to light. Neck:      Thyroid: No thyromegaly. Vascular: No JVD. Trachea: No tracheal deviation. Cardiovascular:      Rate and Rhythm: Normal rate and regular rhythm. Heart sounds: No murmur. No friction rub. No gallop. Pulmonary:      Effort: No respiratory distress. Breath sounds: No wheezing or rales. Chest:      Chest wall: No tenderness. Abdominal:      General: There is no distension. Tenderness: There is no abdominal tenderness. There is no rebound. Musculoskeletal: Normal range of motion. Lymphadenopathy:      Cervical: No cervical adenopathy. Skin:     General: Skin is warm and dry. Neurological:      Mental Status: She is alert and oriented to person, place, and time.       Coordination: Coordination normal.         Current Outpatient Medications   Medication Sig Dispense Refill    Respiratory Therapy Supplies [employer] New CPAP F30 full face mask and supplies 1 Device 0    levothyroxine (SYNTHROID) 125 MCG tablet       albuterol sulfate HFA (PROVENTIL HFA) 108 (90 Base) MCG/ACT inhaler Inhale 2 puffs into the lungs every 6 hours as needed for Wheezing 1 Inhaler 3    Respiratory Therapy Supplies ASIA New CPAP mask and supplies    Send to [facility] 1 Device 0    lisinopril (PRINIVIL;ZESTRIL) 20 MG tablet       aspirin 81 MG chewable tablet       OXYGEN Portable O2 concentrator 2.5 lit 1 Units 0    OXYGEN New oxygen concentrator 2.5 lit with sleep 1 Units 0    Respiratory Therapy Supplies [employer]IA Heated tubing for CPAP #1 1 Device 0    Respiratory Therapy Supplies [employer]IA Change CPAP pressure 10 cm and New CPAP mask and supplies 1 Device 0    CPAP Machine MISC by Does not apply route ADJUST CPAP SETTING TO 10 CM H2O 1 each 0    VORTIoxetine (TRINTELLIX) 10 MG TABS tablet Take 10 mg by mouth daily      Cholecalciferol (VITAMIN D3) 1000 units TABS       metFORMIN (GLUCOPHAGE) 500 MG tablet       CPAP Machine MISC by Does not apply route      OXYGEN Inhale into the lungs      omeprazole (PRILOSEC) 20 MG capsule Take 20 mg by mouth daily.  buPROPion (WELLBUTRIN XL) 300 MG XL tablet Take 300 mg by mouth every morning.  carvedilol (COREG) 25 MG tablet Take 25 mg by mouth 2 times daily (with meals).  diltiazem (CARDIZEM SR) 120 MG SR capsule Take 120 mg by mouth 2 times daily.  ibuprofen (ADVIL;MOTRIN) 800 MG tablet Take 800 mg by mouth every 6 hours as needed.  lansoprazole (PREVACID) 30 MG capsule Take 30 mg by mouth daily.  potassium chloride SA (K-DUR;KLOR-CON) 20 MEQ tablet Take 20 mEq by mouth 2 times daily.  oxybutynin (DITROPAN) 5 MG tablet Take 5 mg by mouth 3 times daily.  simvastatin (ZOCOR) 20 MG tablet Take 20 mg by mouth nightly. No current facility-administered medications for this visit.         Results for orders placed during the hospital encounter of 12/20/17   XR CHEST STANDARD (2 VW)    Narrative EXAMINATION: CHEST TWO VIEWS     CLINICAL HISTORY: J44.9 Chronic obstructive pulmonary disease, unspecified COPD type ([facility] [address] ICD10,

## 2020-07-09 ENCOUNTER — HOSPITAL ENCOUNTER (OUTPATIENT)
Dept: CT IMAGING | Age: 55
Discharge: HOME | End: 2020-07-11
Payer: MEDICAID

## 2020-07-09 PROCEDURE — 71250 CT THORAX DX C-: CPT

## 2020-07-29 ENCOUNTER — OFFICE VISIT (OUTPATIENT)
Dept: PULMONOLOGY | Age: 55
End: 2020-07-29
Payer: MEDICAID

## 2020-07-29 VITALS
BODY MASS INDEX: 52.08 KG/M2 | RESPIRATION RATE: 16 BRPM | HEIGHT: 62 IN | HEART RATE: 87 BPM | SYSTOLIC BLOOD PRESSURE: 124 MMHG | OXYGEN SATURATION: 93 % | DIASTOLIC BLOOD PRESSURE: 86 MMHG | WEIGHT: 283 LBS | TEMPERATURE: 97.6 F

## 2020-07-29 PROCEDURE — G8417 CALC BMI ABV UP PARAM F/U: HCPCS | Performed by: INTERNAL MEDICINE

## 2020-07-29 PROCEDURE — 3023F SPIROM DOC REV: CPT | Performed by: INTERNAL MEDICINE

## 2020-07-29 PROCEDURE — 1036F TOBACCO NON-USER: CPT | Performed by: INTERNAL MEDICINE

## 2020-07-29 PROCEDURE — 99214 OFFICE O/P EST MOD 30 MIN: CPT | Performed by: INTERNAL MEDICINE

## 2020-07-29 PROCEDURE — G8427 DOCREV CUR MEDS BY ELIG CLIN: HCPCS | Performed by: INTERNAL MEDICINE

## 2020-07-29 PROCEDURE — 3017F COLORECTAL CA SCREEN DOC REV: CPT | Performed by: INTERNAL MEDICINE

## 2020-07-29 PROCEDURE — G8926 SPIRO NO PERF OR DOC: HCPCS | Performed by: INTERNAL MEDICINE

## 2020-07-29 ASSESSMENT — ENCOUNTER SYMPTOMS
EYE DISCHARGE: 0
SINUS PRESSURE: 0
WHEEZING: 1
TROUBLE SWALLOWING: 0
COUGH: 0
ABDOMINAL PAIN: 0
DIARRHEA: 0
RHINORRHEA: 0
VOICE CHANGE: 0
CHEST TIGHTNESS: 0
SORE THROAT: 0
EYE ITCHING: 0
NAUSEA: 0
VOMITING: 0
SHORTNESS OF BREATH: 1

## 2020-07-29 NOTE — PROGRESS NOTES
Subjective:     Monica Singh is a 54 y.o. female who complains today of:     Chief Complaint   Patient presents with    Follow-up     four week f/u for CT results. HPI  She came back after after Ct chest   C/o shortness of breath , worse with exertion. On and off  Wheezing   No Cough   No Hemoptysis  No Chest tightness   No Chest pain with radiation  or pleuritic pain  No Fever or chills. No Rhinorrhea and postnasal drip. She is on Albuterol HFA 2 puff prn use  She said she quit smoking 1/12/20     She is using CPAP with   10  centimeters of H2O with heated humidity, 2.5 lit O2 . She is using CPAP for about  4-5 hours every night. She is using CPAP with   Full face Mask. She said  sleep is restful with the CPAP use. She is compliant with CPAP therapy and benefiting with CPAP use. No snoring with CPAP use. No complaint of daytime sleepiness or tiredness with CPAP use. She denies taking naps. No sleepiness with driving. She denies difficulty falling asleep or staying asleep.         Allergies:  Pcn [penicillins] and Sulfa antibiotics  Past Medical History:   Diagnosis Date    Depression     Depression     GERD (gastroesophageal reflux disease)     Hypercholesteremia     Hypertension     Hypothyroidism     Lung disease     ALMA on CPAP      Past Surgical History:   Procedure Laterality Date    CARDIAC CATHETERIZATION      CHOLECYSTECTOMY      CYST REMOVAL      JOINT REPLACEMENT      KNEE CARTILAGE SURGERY      TUBAL LIGATION       Family History   Problem Relation Age of Onset    Heart Failure Mother      Social History     Socioeconomic History    Marital status:      Spouse name: Not on file    Number of children: Not on file    Years of education: Not on file    Highest education level: Not on file   Occupational History    Not on file   Social Needs    Financial resource strain: Not on file    Food insecurity     Worry: Not on file     Inability: Not on file  Transportation needs     Medical: Not on file     Non-medical: Not on file   Tobacco Use    Smoking status: Former Smoker     Packs/day: 0.50     Years: 30.00     Pack years: 15.00     Last attempt to quit: 2020     Years since quittin.5    Smokeless tobacco: Never Used   Substance and Sexual Activity    Alcohol use: Not on file    Drug use: No    Sexual activity: Yes   Lifestyle    Physical activity     Days per week: Not on file     Minutes per session: Not on file    Stress: Not on file   Relationships    Social connections     Talks on phone: Not on file     Gets together: Not on file     Attends [employer]ism service: Not on file     Active member of club or organization: Not on file     Attends meetings of clubs or organizations: Not on file     Relationship status: Not on file    Intimate partner violence     Fear of current or ex partner: Not on file     Emotionally abused: Not on file     Physically abused: Not on file     Forced sexual activity: Not on file   Other Topics Concern    Not on file   Social History Narrative    Not on file         Review of Systems   Constitutional: Negative for chills, diaphoresis, fatigue and fever. HENT: Negative for congestion, mouth sores, nosebleeds, postnasal drip, rhinorrhea, sinus pressure, sneezing, sore throat, trouble swallowing and voice change. Eyes: Negative for discharge, itching and visual disturbance. Respiratory: Positive for shortness of breath and wheezing. Negative for cough and chest tightness. Cardiovascular: Negative for chest pain, palpitations and leg swelling. Gastrointestinal: Negative for abdominal pain, diarrhea, nausea and vomiting. Genitourinary: Negative for difficulty urinating and hematuria. Musculoskeletal: Negative for arthralgias, joint swelling and myalgias. Skin: Negative for rash. Allergic/Immunologic: Negative for environmental allergies and food allergies.    Neurological: Negative for submitted. The cardiac silhouette is of normal size configuration. The mediastinum is unremarkable. Pulmonary vascular is attenuated, lungs are hyperinflated and there is some widening of the AP diameter the chest and coarsening of the interstitium. Right sided trachea. No focal infiltrates. No effusions. Pneumothoraces. Impression NO ACUTE ACTIVE CARDIOPULMONARY PROCESS. RADIOGRAPHIC FINDINGS SUGGESTIVE OF COPD. CORRELATE CLINICALLY. Results for orders placed during the hospital encounter of 07/09/20   CT CHEST WO CONTRAST    Narrative EXAMINATION:  CT SCAN CHEST    CLINICAL HISTORY:  Cough, short of breath. History of blood-tinged sputum. COMPARISON:  None    TECHNIQUE:  Multiple serial axial images of the chest from the base neck through the upper abdomen with both sagittal coronal reconstruction was performed without intravenous or oral administration of contrast.    FINDINGS:      Small area of atelectasis seen at the medial inferior aspect of the left lower lobe. There are no focal parenchymal abnormalities. No pleural effusions. No pneumothoraces. No gross central or proximal endobronchial abnormalities. No significant periaortic, pretracheal, parahilar or subcarinal adenopathy. Within the field-of-view of the abdomen the gallbladder surgically absent. Visualized osseous structures show multilevel degenerative change with bridging osteophytes of the thoracic spine      Impression Unremarkable CT scan the chest as described above      All CT scans at this facility use dose modulation, iterative reconstruction, and/or weight based dosing when appropriate to reduce radiation dose to as low as reasonably achievable. Assessment/Plan:     1. ALMA on CPAP  She is using CPAP with   10  centimeters of H2O with heated humidity, 2.5 lit O2 . She is using CPAP for about  4-5 hours every night. She is using CPAP with   Full face Mask. She said  sleep is restful with the CPAP use. She is compliant with CPAP therapy and benefiting with CPAP use. No snoring with CPAP use. Continue CPAP therapy as before. 2. Morbid obesity ([address]  Patient patient is advised try to lose weight. obesity related risk explained to the patient ,  Current weight:  283 lb (128.4 kg) Lbs. BMI:  Body mass index is 51.76 kg/m². Suggested weight control approaches, including dietary changes , exercise, behavioral modification.    - Latha Hernández MD, Endocrinology, [place]    3. Hypoxia  She is on 2.5 L O2 via nasal cannula mostly during sleep and as needed. Continue O2 to keep saturation 90% or above. 4. Chronic obstructive pulmonary disease, unspecified COPD type ([facility] [address]  She came back after after Ct chest C/o shortness of breath , worse with exertion. On and off  Wheezing No Cough . She is on Albuterol HFA 2 puff prn use. She said she quit smoking 1/12/20 . Continue bronchodilator therapy as before        Return in about 3 months (around 10/29/2020) for lewis, COPD.       Shiv Savage MD

## 2020-08-18 ENCOUNTER — TELEPHONE (OUTPATIENT)
Dept: PULMONOLOGY | Age: 55
End: 2020-08-18

## 2020-08-18 NOTE — TELEPHONE ENCOUNTER
Patient needs to travel to [place] and stay and don't know how long she will be staying there and uses oxygen with CPAP and can't take her O2 concentrator with her. How long can she stay without the oxygen?

## 2020-09-10 ENCOUNTER — OFFICE VISIT (OUTPATIENT)
Dept: ENDOCRINOLOGY | Age: 55
End: 2020-09-10
Payer: MEDICAID

## 2020-09-10 VITALS
HEART RATE: 67 BPM | SYSTOLIC BLOOD PRESSURE: 118 MMHG | WEIGHT: 285 LBS | OXYGEN SATURATION: 93 % | DIASTOLIC BLOOD PRESSURE: 70 MMHG | BODY MASS INDEX: 52.13 KG/M2

## 2020-09-10 DIAGNOSIS — E11.69 TYPE 2 DIABETES MELLITUS WITH OTHER SPECIFIED COMPLICATION, WITHOUT LONG-TERM CURRENT USE OF INSULIN (HCC): ICD-10-CM

## 2020-09-10 LAB
ANION GAP SERPL CALCULATED.3IONS-SCNC: 9 MEQ/L (ref 9–15)
BUN BLDV-MCNC: 14 MG/DL (ref 6–20)
CALCIUM SERPL-MCNC: 8.4 MG/DL (ref 8.5–9.9)
CHLORIDE BLD-SCNC: 109 MEQ/L (ref 95–107)
CO2: 27 MEQ/L (ref 20–31)
CREAT SERPL-MCNC: 1.14 MG/DL (ref 0.5–0.9)
GFR AFRICAN AMERICAN: 59.8
GFR NON-AFRICAN AMERICAN: 49.4
GLUCOSE BLD-MCNC: 131 MG/DL (ref 70–99)
HBA1C MFR BLD: 7.2 % (ref 4.8–5.9)
POTASSIUM SERPL-SCNC: 4.2 MEQ/L (ref 3.4–4.9)
SODIUM BLD-SCNC: 145 MEQ/L (ref 135–144)

## 2020-09-10 PROCEDURE — 99243 OFF/OP CNSLTJ NEW/EST LOW 30: CPT | Performed by: INTERNAL MEDICINE

## 2020-09-10 PROCEDURE — G8417 CALC BMI ABV UP PARAM F/U: HCPCS | Performed by: INTERNAL MEDICINE

## 2020-09-10 PROCEDURE — 2022F DILAT RTA XM EVC RTNOPTHY: CPT | Performed by: INTERNAL MEDICINE

## 2020-09-10 PROCEDURE — G8427 DOCREV CUR MEDS BY ELIG CLIN: HCPCS | Performed by: INTERNAL MEDICINE

## 2020-09-10 RX ORDER — DULAGLUTIDE 0.75 MG/.5ML
0.75 INJECTION, SOLUTION SUBCUTANEOUS WEEKLY
Qty: 4 PEN | Refills: 3 | Status: SHIPPED | OUTPATIENT
Start: 2020-09-10 | End: 2020-10-22

## 2020-09-10 NOTE — PROGRESS NOTES
Subjective:      Patient ID: Elia Jimenes is a 54 y.o. female. Patient here for obesity BMI of 50  Does have type 2 diabetes but stable to good control  Patient does also have hypothyroidism on replacement Synthroid  For type 2 diabetes patient on metformin 500 mg twice daily  Diabetes   She presents for her initial diabetic visit. She has type 2 diabetes mellitus. There are no hypoglycemic associated symptoms. There are no diabetic associated symptoms. Symptoms are stable. Risk factors for coronary artery disease include obesity. Current diabetic treatments: Metformin. Her overall blood glucose range is 130-140 mg/dl. (Lab Results       Component                Value               Date                       LABA1C                   7.2 (H)             09/10/2020            ) An ACE inhibitor/angiotensin II receptor blocker is being taken. Morbid obesity Body mass index is 52.13 kg/m². Patient does also have a COPD and sleep apnea    Hypothyroidism on replacement with Synthroid and thyroid function tests are stable    Results for Lida De Jesus (MRN [id number]) as of 9/10/2020 14:09   Ref.  Range 6/10/2016 10:59 12/20/2017 14:31 6/24/2020 08:02   Sodium Latest Ref Range: 135 - 144 mEq/L 141  145 (H)   Potassium Latest Ref Range: 3.4 - 4.9 mEq/L 4.4  4.2   Chloride Latest Ref Range: 95 - 107 mEq/L 103  109 (H)   CO2 Latest Ref Range: 20 - 31 mEq/L 26  20   BUN Latest Ref Range: 6 - 20 mg/dL 19  20   Creatinine Latest Ref Range: 0.50 - 0.90 mg/dL 0.97 (H)  1.02 (H)   Anion Gap Latest Ref Range: 9 - 15 mEq/L 12  16 (H)   GFR Non- Latest Ref Range: >60  >60.0  56.2 (L)   GFR  Latest Ref Range: >60  >60.0  >60.0   Glucose Latest Ref Range: 70 - 99 mg/dL 120 (H)  149 (H)   Calcium Latest Ref Range: 8.5 - 9.9 mg/dL 8.9  9.2   Total Protein Latest Ref Range: 6.3 - 8.0 g/dL 6.8  7.4   Cholesterol, Total Latest Ref Range: 0 - 199 mg/dL 158  139   HDL Cholesterol Latest Ref Range: 40 - 59 mg/dL 51  40   LDL Calculated Latest Ref Range: 0 - 129 mg/dL 82  40   Triglycerides Latest Ref Range: 0 - 150 mg/dL 125  296 (H)   Albumin Latest Ref Range: 3.5 - 4.6 g/dL 4.0  4.1   Globulin Latest Ref Range: 2.3 - 3.5 g/dL 2.8  3.3   Alk Phos Latest Ref Range: 40 - 130 U/L 102  82   ALT Latest Ref Range: 0 - 33 U/L 18  19   AST Latest Ref Range: 0 - 35 U/L 13  16   Bilirubin Latest Ref Range: 0.2 - 0.7 mg/dL 0.3  <0.2   Hemoglobin A1C Latest Ref Range: 4.8 - 5.9 % 6.7 (H)     TSH Latest Ref Range: 0.440 - 3.860 uIU/mL 1.120  1.520     Patient Active Problem List   Diagnosis    Hypothyroidism    Hypertension    Hypercholesteremia    Depression    ALMA on CPAP    GERD (gastroesophageal reflux disease)    Hypersomnia    Chronic obstructive pulmonary disease (HCC)    Morbid obesity (HCC)    Smoking    Hypoxia     Social History     Socioeconomic History    Marital status:      Spouse name: Not on file    Number of children: Not on file    Years of education: Not on file    Highest education level: Not on file   Occupational History    Not on file   Social Needs    Financial resource strain: Not on file    Food insecurity     Worry: Not on file     Inability: Not on file    Transportation needs     Medical: Not on file     Non-medical: Not on file   Tobacco Use    Smoking status: Former Smoker     Packs/day: 0.50     Years: 30.00     Pack years: 15.00     Last attempt to quit: 2020     Years since quittin.6    Smokeless tobacco: Never Used   Substance and Sexual Activity    Alcohol use: Not on file    Drug use: No    Sexual activity: Yes   Lifestyle    Physical activity     Days per week: Not on file     Minutes per session: Not on file    Stress: Not on file   Relationships    Social connections     Talks on phone: Not on file     Gets together: Not on file     Attends [employer] service: Not on file     Active member of club or organization: Not on file     Attends daily (with meals). , Disp: , Rfl:     diltiazem (CARDIZEM SR) 120 MG SR capsule, Take 120 mg by mouth 2 times daily. , Disp: , Rfl:     ibuprofen (ADVIL;MOTRIN) 800 MG tablet, Take 800 mg by mouth every 6 hours as needed. , Disp: , Rfl:     lansoprazole (PREVACID) 30 MG capsule, Take 30 mg by mouth daily. , Disp: , Rfl:     potassium chloride SA (K-DUR;KLOR-CON) 20 MEQ tablet, Take 20 mEq by mouth 2 times daily. , Disp: , Rfl:     oxybutynin (DITROPAN) 5 MG tablet, Take 5 mg by mouth 3 times daily. , Disp: , Rfl:     simvastatin (ZOCOR) 20 MG tablet, Take 20 mg by mouth nightly.  , Disp: , Rfl:       Review of Systems   Respiratory: Positive for shortness of breath and wheezing. Endocrine: Negative. Psychiatric/Behavioral: Positive for sleep disturbance. All other systems reviewed and are negative. Vitals:    09/10/20 1354   BP: 118/70   Pulse: 67   SpO2: 93%   Weight: 285 lb (129.3 kg)       Objective:   Physical Exam  Constitutional:       Appearance: Normal appearance. She is obese. HENT:      Head: Normocephalic and atraumatic. Right Ear: External ear normal.      Left Ear: External ear normal.      Nose: Nose normal.      Mouth/Throat:      Mouth: Mucous membranes are moist.   Eyes:      General: No scleral icterus. Right eye: No discharge. Left eye: No discharge. Extraocular Movements: Extraocular movements intact. Conjunctiva/sclera: Conjunctivae normal.      Pupils: Pupils are equal, round, and reactive to light. Neck:      Musculoskeletal: Normal range of motion and neck supple. Cardiovascular:      Rate and Rhythm: Normal rate and regular rhythm. Heart sounds: Normal heart sounds. Pulmonary:      Breath sounds: Wheezing present. Abdominal:      Palpations: Abdomen is soft. Musculoskeletal: Normal range of motion. Feet:    Skin:     General: Skin is warm and dry. Neurological:      General: No focal deficit present. Mental Status: She is alert and oriented to person, place, and time. Psychiatric:         Mood and Affect: Mood normal.         Behavior: Behavior normal.         Assessment:       Diagnosis Orders   1. Morbid obesity ([address]  Amb External Referral To Bariatrics   2.  Type 2 diabetes mellitus with other specified complication, without long-term current use of insulin (HCC)  Basic Metabolic Panel    Hemoglobin A1C           Plan:      Orders Placed This Encounter   Procedures    Basic Metabolic Panel     Standing Status:   Future     Number of Occurrences:   1     Standing Expiration Date:   9/10/2021    Hemoglobin A1C     Standing Status:   Future     Number of Occurrences:   1     Standing Expiration Date:   9/10/2021    Amb External Referral To Bariatrics     Referral Priority:   Routine     Referral Type:   Consult for Advice and Opinion     Referral Reason:   Specialty Services Required     Referred to Provider:   Isabel Lacy MD     Requested Specialty:   Bariatric Surgery     Number of Visits Requested:   1     Orders Placed This Encounter   Medications    Dulaglutide (TRULICITY) 5.79 IE/4.3GT SOPN     Sig: Inject 0.75 mg into the skin once a week     Dispense:  4 pen     Refill:  3     And Trulicity to metformin  Refer patient to bariatric surgery  Will need clearance from cardiology pulmonary before surgery  More than 50% of 40 minutes spent in patient education counseling  Thank you for the referral        Brii Chavez MD

## 2020-09-14 ASSESSMENT — ENCOUNTER SYMPTOMS
SHORTNESS OF BREATH: 1
WHEEZING: 1

## 2020-09-16 RX ORDER — GLUCOSAMINE HCL/CHONDROITIN SU 500-400 MG
CAPSULE ORAL
Qty: 100 STRIP | Refills: 3 | Status: SHIPPED | OUTPATIENT
Start: 2020-09-16 | End: 2020-09-17 | Stop reason: SDUPTHER

## 2020-09-16 RX ORDER — LANCETS 30 GAUGE
EACH MISCELLANEOUS
Qty: 100 EACH | Refills: 3 | Status: SHIPPED | OUTPATIENT
Start: 2020-09-16 | End: 2020-09-17 | Stop reason: SDUPTHER

## 2020-09-17 RX ORDER — GLUCOSAMINE HCL/CHONDROITIN SU 500-400 MG
CAPSULE ORAL
Qty: 100 STRIP | Refills: 3 | Status: SHIPPED | OUTPATIENT
Start: 2020-09-17

## 2020-09-17 RX ORDER — LANCETS 30 GAUGE
EACH MISCELLANEOUS
Qty: 100 EACH | Refills: 3 | Status: SHIPPED | OUTPATIENT
Start: 2020-09-17

## 2020-10-02 ENCOUNTER — OFFICE VISIT (OUTPATIENT)
Dept: ENDOCRINOLOGY | Age: 55
End: 2020-10-02
Payer: MEDICAID

## 2020-10-02 VITALS
HEART RATE: 74 BPM | SYSTOLIC BLOOD PRESSURE: 139 MMHG | DIASTOLIC BLOOD PRESSURE: 85 MMHG | OXYGEN SATURATION: 92 % | BODY MASS INDEX: 51.94 KG/M2 | WEIGHT: 284 LBS

## 2020-10-02 PROCEDURE — 3051F HG A1C>EQUAL 7.0%<8.0%: CPT | Performed by: INTERNAL MEDICINE

## 2020-10-02 PROCEDURE — 1036F TOBACCO NON-USER: CPT | Performed by: INTERNAL MEDICINE

## 2020-10-02 PROCEDURE — 2022F DILAT RTA XM EVC RTNOPTHY: CPT | Performed by: INTERNAL MEDICINE

## 2020-10-02 PROCEDURE — 99213 OFFICE O/P EST LOW 20 MIN: CPT | Performed by: INTERNAL MEDICINE

## 2020-10-02 PROCEDURE — 3017F COLORECTAL CA SCREEN DOC REV: CPT | Performed by: INTERNAL MEDICINE

## 2020-10-02 PROCEDURE — G8484 FLU IMMUNIZE NO ADMIN: HCPCS | Performed by: INTERNAL MEDICINE

## 2020-10-02 PROCEDURE — G8417 CALC BMI ABV UP PARAM F/U: HCPCS | Performed by: INTERNAL MEDICINE

## 2020-10-02 PROCEDURE — G8427 DOCREV CUR MEDS BY ELIG CLIN: HCPCS | Performed by: INTERNAL MEDICINE

## 2020-10-02 NOTE — PROGRESS NOTES
Subjective:      Patient ID: Nikkie Alvarado is a 54 y.o. female. Follow-up on type 2 diabetes patient started on Trulicity on top of metformin F8V was 7.2 complications diabetes include chronic kidney disease  Diabetes   She presents for her follow-up diabetic visit. She has type 2 diabetes mellitus. Symptoms are stable. Diabetic complications include nephropathy. Risk factors for coronary artery disease include obesity. Current diabetic treatments: Trulicity plus metformin. Her overall blood glucose range is 140-180 mg/dl. Results for Jomar Carlson (MRN [id number]) as of 10/2/2020 15:11   Ref.  Range 9/10/2020 14:23   Sodium Latest Ref Range: 135 - 144 mEq/L 145 (H)   Potassium Latest Ref Range: 3.4 - 4.9 mEq/L 4.2   Chloride Latest Ref Range: 95 - 107 mEq/L 109 (H)   CO2 Latest Ref Range: 20 - 31 mEq/L 27   BUN Latest Ref Range: 6 - 20 mg/dL 14   Creatinine Latest Ref Range: 0.50 - 0.90 mg/dL 1.14 (H)   Anion Gap Latest Ref Range: 9 - 15 mEq/L 9   GFR Non- Latest Ref Range: >60  49.4 (L)   GFR  Latest Ref Range: >60  59.8 (L)   Glucose Latest Ref Range: 70 - 99 mg/dL 131 (H)   Calcium Latest Ref Range: 8.5 - 9.9 mg/dL 8.4 (L)   Hemoglobin A1C Latest Ref Range: 4.8 - 5.9 % 7.2 (H)     Patient Active Problem List   Diagnosis    Hypothyroidism    Hypertension    Hypercholesteremia    Depression    ALMA on CPAP    GERD (gastroesophageal reflux disease)    Hypersomnia    Chronic obstructive pulmonary disease (HCC)    Morbid obesity (HCC)    Smoking    Hypoxia     Allergies   Allergen Reactions    Pcn [Penicillins]     Sulfa Antibiotics        Current Outpatient Medications:     Lancets MISC, Test 2x daily, Disp: 100 each, Rfl: 3    blood glucose monitor strips, Test 2x daily, Disp: 100 strip, Rfl: 3    blood glucose monitor kit and supplies, Give 1 meter covered by insurance, Disp: 1 kit, Rfl: 0    Dulaglutide (TRULICITY) 4.58 TONE/1.0WB SOPN, Inject 0.75 mg into the skin once a week, Disp: 4 pen, Rfl: 3    Respiratory Therapy Supplies [employer]IA, New CPAP F30 full face mask and supplies, Disp: 1 Device, Rfl: 0    levothyroxine (SYNTHROID) 125 MCG tablet, , Disp: , Rfl:     albuterol sulfate HFA (PROVENTIL HFA) 108 (90 Base) MCG/ACT inhaler, Inhale 2 puffs into the lungs every 6 hours as needed for Wheezing, Disp: 1 Inhaler, Rfl: 3    lisinopril (PRINIVIL;ZESTRIL) 20 MG tablet, , Disp: , Rfl:     aspirin 81 MG chewable tablet, , Disp: , Rfl:     OXYGEN, New oxygen concentrator 2.5 lit with sleep, Disp: 1 Units, Rfl: 0    CPAP Machine MISC, by Does not apply route ADJUST CPAP SETTING TO 10 CM H2O, Disp: 1 each, Rfl: 0    VORTIoxetine (TRINTELLIX) 10 MG TABS tablet, Take 10 mg by mouth daily, Disp: , Rfl:     Cholecalciferol (VITAMIN D3) 1000 units TABS, , Disp: , Rfl:     metFORMIN (GLUCOPHAGE) 500 MG tablet, , Disp: , Rfl:     omeprazole (PRILOSEC) 20 MG capsule, Take 20 mg by mouth daily. , Disp: , Rfl:     buPROPion (WELLBUTRIN XL) 300 MG XL tablet, Take 300 mg by mouth every morning., Disp: , Rfl:     carvedilol (COREG) 25 MG tablet, Take 25 mg by mouth 2 times daily (with meals). , Disp: , Rfl:     diltiazem (CARDIZEM SR) 120 MG SR capsule, Take 120 mg by mouth 2 times daily. , Disp: , Rfl:     ibuprofen (ADVIL;MOTRIN) 800 MG tablet, Take 800 mg by mouth every 6 hours as needed. , Disp: , Rfl:     lansoprazole (PREVACID) 30 MG capsule, Take 30 mg by mouth daily. , Disp: , Rfl:     potassium chloride SA (K-DUR;KLOR-CON) 20 MEQ tablet, Take 20 mEq by mouth 2 times daily. , Disp: , Rfl:     oxybutynin (DITROPAN) 5 MG tablet, Take 5 mg by mouth 3 times daily. , Disp: , Rfl:     simvastatin (ZOCOR) 20 MG tablet, Take 20 mg by mouth nightly.  , Disp: , Rfl:     Review of Systems    Vitals:    10/02/20 1506   BP: 139/85   Pulse: 74   SpO2: 92%   Weight: 284 lb (128.8 kg)       Objective:   Physical Exam  Constitutional:       Appearance: Normal appearance.  She

## 2020-10-16 ENCOUNTER — HOSPITAL ENCOUNTER (OUTPATIENT)
Dept: NUTRITION | Age: 55
Discharge: HOME | End: 2020-10-16
Payer: MEDICAID

## 2020-10-16 PROCEDURE — 97802 MEDICAL NUTRITION INDIV IN: CPT | Performed by: DIETITIAN, REGISTERED

## 2020-10-16 NOTE — PROGRESS NOTES
OUTPATIENT NUTRITION COUNSELING       Harinder Florentino is a 54 y.o.  female     Reason for Counseling: Weight loss, DM, HLD    Subjective/Current Data:  Pt stated that she was recently told by one physician that she has diabetes. She was given information about bariatric surgery, but she would like to try to lose weight with diet and exercise instead. Pt is very concerned about body image and confidence. Pt stated that she does not want to have loose, hanging skin due to weight loss. Pt has good motivations for change such as wanting to see [place] in her son's life. She shops and cooks for herself. She shops once a month with SNAP benefits. She eats 1-2 meals per day and snacks of she has the foods in the house. She drinks 3-4  20 oz bottles of ginger ale per day plus 48-64 oz of water. She skips breakfast. Lunch is 1-2 grilled cheese sandwiches; fries; tuna and crackers; or kilbosa. Dinner is pork chops, peas or green beans, mashed potatoes or steak with 2 slices of bread. Snacks are chips, popcorn, ice cream,and Oreos. Encouraged pt to limit intake of expensive processed snack foods and snack on fruits/vegetables instead. Objective Data:    Past Medical History:  Past Medical History:   Diagnosis Date    Depression     Depression     GERD (gastroesophageal reflux disease)     Hypercholesteremia     Hypertension     Hypothyroidism     Lung disease     ALMA on CPAP      Past Surgical History:   Procedure Laterality Date    CARDIAC CATHETERIZATION      CHOLECYSTECTOMY      CYST REMOVAL      JOINT REPLACEMENT      KNEE CARTILAGE SURGERY      TUBAL LIGATION         Labs:    Chemistry CBC/Coags Misc. No results for input(s): NA, K, CL, CO2, BUN, CREATININE, GLUCOSE in the last 72 hours. Invalid input(s): CA  No results for input(s): PHOS in the last 72 hours. No results for input(s): WBC, RBC, HGB, HCT, MCV, MCH, MCHC, RDW, PLT, MPV in the last 72 hours.  No results for input(s): LABALBU in the last 72 hours. Invalid input(s):  PREALBUMIN  Lab Results   Component Value Date    LABA1C 7.2 09/10/2020            Anthropometric Measures:  Height: 62\"  Weight: 285 lb (129.3 kg)  Ideal Body Weight: 50 kg  Ideal Body Weight %:259%  BMI: 52.13 kg   There is no height or weight on file to calculate BMI. which is classified as class III obesity  Less than 18.5 Underweight  18.5-24.9 Normal Weight  25-29.9 Overweight  30-34.9 Obese Class I  35-39.9 Obese Class II  Greater than or equal to 40 Obese Class III. Wt Readings from Last 20 Encounters:   10/02/20 284 lb (128.8 kg)   09/10/20 285 lb (129.3 kg)   07/29/20 283 lb (128.4 kg)   06/24/20 281 lb (127.5 kg)   01/09/20 275 lb (124.7 kg)   06/17/19 270 lb (122.5 kg)   12/20/18 267 lb (121.1 kg)   06/26/18 270 lb 9.6 oz (122.7 kg)   02/28/18 264 lb 9.6 oz (120 kg)   10/25/17 249 lb (112.9 kg)   04/08/13 272 lb (123.4 kg)   12/14/11 258 lb (117 kg)       Assessment and Plan:  Pt does not seem ready for major lifestyle changes. She stated \"that's not gonna happen\" to some recommended changes. She is willing to make some small changes at this time. Discussed affordable healthy foods with pt vs purchasing expensive processed foods. Reviewed general healthful nutrition and sources of carbs, protein, vegetables, and fat. Reviewed serving sizes and portion control.      Nutritional Requirements:  Estimated Energy Needs:  Weight Used: 129 kg   Method Used: 11 kcals/kg  Estimated kcal Needs: 1400 kcal per day to promote 1-2 lb weight loss per week  Protein Needs:  Weight used: 50 kg  1 g/kg = 50 g per day      Nutrition Diagnosis and Goal  Problem: Food and nutrition related knowledge deficit  Etiology/related to: obesity, lack of prior nutrition education  Symptoms/Signs/as evidenced by: BMI, diet recall      Goal: 1-2 lb weight loss per week; consistent meal/snack pattern with controled carb intake; switch from whole milk to 2% milk; limit ginger ale to only 1 per day on even days of the month; chair exercises for 20-30 minutes daily; snack on lite canned fruit instead of chips/ice cream/ cookies  Education Needs: Provided weight control guidelines      NUTRITION RECOMMENDATIONS / MONITORING / EVALUATION  1. Name and Office phone number given for reference.   2. Pt will call if follow up appointment needed fall precautions

## 2020-10-22 RX ORDER — DULAGLUTIDE 0.75 MG/.5ML
INJECTION, SOLUTION SUBCUTANEOUS
Qty: 2 ML | Refills: 2 | Status: SHIPPED | OUTPATIENT
Start: 2020-10-22 | End: 2020-12-16

## 2020-10-27 ENCOUNTER — HOSPITAL ENCOUNTER (OUTPATIENT)
Dept: GENERAL RADIOLOGY | Age: 55
Discharge: HOME | End: 2020-10-29
Payer: MEDICAID

## 2020-10-27 PROCEDURE — 6370000000 HC RX 637 (ALT 250 FOR IP): Performed by: INTERNAL MEDICINE

## 2020-10-27 PROCEDURE — 74240 X-RAY XM UPR GI TRC 1CNTRST: CPT

## 2020-10-27 PROCEDURE — 2500000003 HC RX 250 WO HCPCS: Performed by: INTERNAL MEDICINE

## 2020-10-27 RX ADMIN — ANTACID/ANTIFLATULENT 1 EACH: 380; 550; 10; 10 GRANULE, EFFERVESCENT ORAL at 11:01

## 2020-10-27 RX ADMIN — BARIUM SULFATE 100 G: 960 POWDER, FOR SUSPENSION ORAL at 11:00

## 2020-10-27 RX ADMIN — BARIUM SULFATE 334 G: 980 POWDER, FOR SUSPENSION ORAL at 11:00

## 2020-12-16 RX ORDER — DULAGLUTIDE 0.75 MG/.5ML
INJECTION, SOLUTION SUBCUTANEOUS
Qty: 2 ML | Refills: 1 | Status: SHIPPED | OUTPATIENT
Start: 2020-12-16 | End: 2021-02-24 | Stop reason: SDUPTHER

## 2021-02-24 RX ORDER — DULAGLUTIDE 0.75 MG/.5ML
INJECTION, SOLUTION SUBCUTANEOUS
Qty: 4 PEN | Refills: 3 | Status: SHIPPED | OUTPATIENT
Start: 2021-02-24

## 2021-02-25 RX ORDER — ALBUTEROL SULFATE 90 UG/1
2 AEROSOL, METERED RESPIRATORY (INHALATION) EVERY 6 HOURS PRN
Qty: 1 INHALER | Refills: 3 | Status: SHIPPED | OUTPATIENT
Start: 2021-02-25 | End: 2022-01-21 | Stop reason: SDUPTHER

## 2021-04-01 ENCOUNTER — VIRTUAL VISIT (OUTPATIENT)
Dept: PULMONOLOGY | Age: 56
End: 2021-04-01
Payer: MEDICAID

## 2021-04-01 DIAGNOSIS — J44.9 CHRONIC OBSTRUCTIVE PULMONARY DISEASE, UNSPECIFIED COPD TYPE (HCC): ICD-10-CM

## 2021-04-01 DIAGNOSIS — G47.33 OSA ON CPAP: Primary | ICD-10-CM

## 2021-04-01 DIAGNOSIS — E66.01 MORBID OBESITY (HCC): ICD-10-CM

## 2021-04-01 DIAGNOSIS — R09.02 HYPOXIA: ICD-10-CM

## 2021-04-01 DIAGNOSIS — Z99.89 OSA ON CPAP: Primary | ICD-10-CM

## 2021-04-01 PROCEDURE — 99213 OFFICE O/P EST LOW 20 MIN: CPT | Performed by: INTERNAL MEDICINE

## 2021-04-01 ASSESSMENT — ENCOUNTER SYMPTOMS
ABDOMINAL PAIN: 0
VOMITING: 0
EYE ITCHING: 0
EYE DISCHARGE: 0
NAUSEA: 0
TROUBLE SWALLOWING: 0
CHEST TIGHTNESS: 0
VOICE CHANGE: 0
WHEEZING: 1
DIARRHEA: 0
COUGH: 0
SORE THROAT: 0
RHINORRHEA: 0
SHORTNESS OF BREATH: 1
SINUS PRESSURE: 0

## 2021-08-05 ENCOUNTER — VIRTUAL VISIT (OUTPATIENT)
Dept: PULMONOLOGY | Age: 56
End: 2021-08-05
Payer: MEDICAID

## 2021-08-05 DIAGNOSIS — G47.33 OSA ON CPAP: Primary | ICD-10-CM

## 2021-08-05 DIAGNOSIS — E66.01 MORBID OBESITY (HCC): ICD-10-CM

## 2021-08-05 DIAGNOSIS — R09.02 HYPOXIA: ICD-10-CM

## 2021-08-05 DIAGNOSIS — J44.9 CHRONIC OBSTRUCTIVE PULMONARY DISEASE, UNSPECIFIED COPD TYPE (HCC): ICD-10-CM

## 2021-08-05 DIAGNOSIS — Z99.89 OSA ON CPAP: Primary | ICD-10-CM

## 2021-08-05 PROCEDURE — 99214 OFFICE O/P EST MOD 30 MIN: CPT | Performed by: INTERNAL MEDICINE

## 2021-08-05 ASSESSMENT — ENCOUNTER SYMPTOMS
SINUS PRESSURE: 0
RHINORRHEA: 0
DIARRHEA: 0
SORE THROAT: 0
TROUBLE SWALLOWING: 0
EYE ITCHING: 0
SHORTNESS OF BREATH: 1
WHEEZING: 0
VOICE CHANGE: 0
CHEST TIGHTNESS: 0
EYE DISCHARGE: 0
NAUSEA: 0
VOMITING: 0
ABDOMINAL PAIN: 0
COUGH: 0

## 2021-08-05 NOTE — PROGRESS NOTES
Subjective:     Merry Flores is a 64 y.o. female who complains today of:     Chief Complaint   Patient presents with    Follow-up     Patient and/or health care decision maker is aware that that he/she may receive a bill for this telephone service, depending on his insurance coverage, and has provided verbal consent to proceed. HPI  She is using CPAP with  10  centimeters of H2O with heated humidity and 2.5 liy . She is using CPAP for about   5-7  hours every night. She is using CPAP with  Full face  Mask. She said  sleep is restful with the CPAP use. She is compliant with CPAP therapy and benefiting with CPAP use. No snoring with CPAP use. No complaint of daytime sleepiness or tiredness with CPAP use. She denies taking naps. She denies difficulty falling asleep or staying asleep. C/o shortness of breath with exertion. No  Wheezing. No Cough with  Sputum. No Hemoptysis. No Chest tightness. No Chest pain with radiation  or pleuritic pain. No  leg edema. No orthopnea. No Fever or chills. No Rhinorrhea and postnasal drip.     She is using bronchodilator with albuterol  HFA prn  But she is doing better and does not need to use it             Allergies:  Pcn [penicillins] and Sulfa antibiotics  Past Medical History:   Diagnosis Date    Depression     Depression     GERD (gastroesophageal reflux disease)     Hypercholesteremia     Hypertension     Hypothyroidism     Lung disease     ALMA on CPAP      Past Surgical History:   Procedure Laterality Date    CARDIAC CATHETERIZATION      CHOLECYSTECTOMY      CYST REMOVAL      JOINT REPLACEMENT      KNEE CARTILAGE SURGERY      TUBAL LIGATION       Family History   Problem Relation Age of Onset    Heart Failure Mother      Social History     Socioeconomic History    Marital status:      Spouse name: Not on file    Number of children: Not on file    Years of education: Not on file    Highest education level: Not on file Occupational History    Not on file   Tobacco Use    Smoking status: Former Smoker     Packs/day: 0.50     Years: 30.00     Pack years: 15.00     Quit date: 2020     Years since quittin.5    Smokeless tobacco: Never Used   Substance and Sexual Activity    Alcohol use: Not on file    Drug use: No    Sexual activity: Yes   Other Topics Concern    Not on file   Social History Narrative    Not on file     Social Determinants of Health     Financial Resource Strain:     Difficulty of Paying Living Expenses:    Food Insecurity:     Worried About Running Out of Food in the Last Year:     [address] in the Last Year:    Transportation Needs:     Lack of Transportation (Medical):  Lack of Transportation (Non-Medical):    Physical Activity:     Days of Exercise per Week:     Minutes of Exercise per Session:    Stress:     Feeling of Stress :    Social Connections:     Frequency of Communication with Friends and Family:     Frequency of Social Gatherings with Friends and Family:     Attends Orthodox Services:     Active Member of Clubs or Organizations:     Attends Club or Organization Meetings:     Marital Status:    Intimate Partner Violence:     Fear of Current or Ex-Partner:     Emotionally Abused:     Physically Abused:     Sexually Abused:          Review of Systems   Constitutional: Negative for chills, diaphoresis, fatigue and fever. HENT: Negative for congestion, mouth sores, nosebleeds, postnasal drip, rhinorrhea, sinus pressure, sneezing, sore throat, trouble swallowing and voice change. Eyes: Negative for discharge, itching and visual disturbance. Respiratory: Positive for shortness of breath. Negative for cough, chest tightness and wheezing. Cardiovascular: Negative for chest pain, palpitations and leg swelling. Gastrointestinal: Negative for abdominal pain, diarrhea, nausea and vomiting. Genitourinary: Negative for difficulty urinating and hematuria. ibuprofen (ADVIL;MOTRIN) 800 MG tablet Take 800 mg by mouth every 6 hours as needed.  lansoprazole (PREVACID) 30 MG capsule Take 30 mg by mouth daily.  potassium chloride SA (K-DUR;KLOR-CON) 20 MEQ tablet Take 20 mEq by mouth 2 times daily.  oxybutynin (DITROPAN) 5 MG tablet Take 5 mg by mouth 3 times daily.  simvastatin (ZOCOR) 20 MG tablet Take 20 mg by mouth nightly. No current facility-administered medications for this visit. Results for orders placed during the hospital encounter of 12/20/17    XR CHEST STANDARD (2 VW)    Narrative  EXAMINATION: CHEST TWO VIEWS    CLINICAL HISTORY: J44.9 Chronic obstructive pulmonary disease, unspecified COPD type ([facility] [address] ICD10, follow-up    COMPARISONS: August 6, 2012    FINDINGS:    Two views of the chest are submitted. The cardiac silhouette is of normal size configuration. The mediastinum is unremarkable. Pulmonary vascular is attenuated, lungs are hyperinflated and there is some widening of the AP diameter the chest and coarsening of the interstitium. Right sided trachea. No focal infiltrates. No effusions. Pneumothoraces. Impression  NO ACUTE ACTIVE CARDIOPULMONARY PROCESS. RADIOGRAPHIC FINDINGS SUGGESTIVE OF COPD. CORRELATE CLINICALLY.  ]  No results found for this or any previous visit.  ]  No results found for this or any previous visit.  ]  Results for orders placed during the hospital encounter of 07/09/20    CT CHEST WO CONTRAST    Narrative  EXAMINATION:  CT SCAN CHEST    CLINICAL HISTORY:  Cough, short of breath. History of blood-tinged sputum. COMPARISON:  None    TECHNIQUE:  Multiple serial axial images of the chest from the base neck through the upper abdomen with both sagittal coronal reconstruction was performed without intravenous or oral administration of contrast.    FINDINGS:    Small area of atelectasis seen at the medial inferior aspect of the left lower lobe.   There are no focal parenchymal abnormalities. No pleural effusions. No pneumothoraces. No gross central or proximal endobronchial abnormalities. No significant periaortic, pretracheal, parahilar or subcarinal adenopathy. Within the field-of-view of the abdomen the gallbladder surgically absent. Visualized osseous structures show multilevel degenerative change with bridging osteophytes of the thoracic spine    Impression  Unremarkable CT scan the chest as described above      All CT scans at this facility use dose modulation, iterative reconstruction, and/or weight based dosing when appropriate to reduce radiation dose to as low as reasonably achievable.  ]  No results found for this or any previous visit.  ]    Assessment/Plan:     1. ALMA on CPAP  She is using CPAP with  10  centimeters of H2O with heated humidity and 2.5 lit . She is using CPAP for about   5-7  hours every night. She is using CPAP with  Full face  Mask. She said  sleep is restful with the CPAP use. She is compliant with CPAP therapy and benefiting with CPAP use. No snoring with CPAP use. Continue CPAP as before. Counseling: CPAP/BiPAP uses, She advised to use CPAP at least 5-6 hours every night. Driving: She is advised for extreme caution when driving or operating machinery if there is a feeling of drowsiness, especially while driving it is preferable to stop driving and take a brief nap. Sleep hygiene:Avoid supine sleep, sleep on  sides. Avoid  sleep deprivation. Explained sleep hygiene. Advice to avoid Alcohol and sedative    2. Morbid obesity ([address]  She is advised try to lose weight. obesity related risk explained to the patient ,  Suggested weight control approaches, including dietary changes , exercise, behavioral modification. 3. Hypoxia  He is on 2.5 L O2 with sleep. Continue O2 to keep saturation 90% above    4. Chronic obstructive pulmonary disease, unspecified COPD type ([address]  C/o shortness of breath with exertion. No  Wheezing.  No Cough with Sputum. She is using bronchodilator with albuterol  HFA prn  but she is doing better and does not need to use it       Patient notified that this is a billable service and has given verbal consent for telehealth services. Time spent with patient 22  minutes. Return in about 4 months (around 12/5/2021) for lewis, COPD, hypoxia on O2.       Mendoza Sweeeny MD

## 2022-01-21 ENCOUNTER — OFFICE VISIT (OUTPATIENT)
Dept: PULMONOLOGY | Age: 57
End: 2022-01-21
Payer: MEDICAID

## 2022-01-21 VITALS
BODY MASS INDEX: 49.69 KG/M2 | TEMPERATURE: 98.2 F | SYSTOLIC BLOOD PRESSURE: 137 MMHG | WEIGHT: 270 LBS | OXYGEN SATURATION: 97 % | DIASTOLIC BLOOD PRESSURE: 88 MMHG | HEIGHT: 62 IN | HEART RATE: 89 BPM

## 2022-01-21 DIAGNOSIS — Z99.89 OSA ON CPAP: Primary | ICD-10-CM

## 2022-01-21 DIAGNOSIS — R09.02 HYPOXIA: ICD-10-CM

## 2022-01-21 DIAGNOSIS — J44.9 CHRONIC OBSTRUCTIVE PULMONARY DISEASE, UNSPECIFIED COPD TYPE (HCC): ICD-10-CM

## 2022-01-21 DIAGNOSIS — G47.33 OSA ON CPAP: Primary | ICD-10-CM

## 2022-01-21 DIAGNOSIS — E66.01 MORBID OBESITY (HCC): ICD-10-CM

## 2022-01-21 PROCEDURE — G8417 CALC BMI ABV UP PARAM F/U: HCPCS | Performed by: INTERNAL MEDICINE

## 2022-01-21 PROCEDURE — 3023F SPIROM DOC REV: CPT | Performed by: INTERNAL MEDICINE

## 2022-01-21 PROCEDURE — G8484 FLU IMMUNIZE NO ADMIN: HCPCS | Performed by: INTERNAL MEDICINE

## 2022-01-21 PROCEDURE — 1036F TOBACCO NON-USER: CPT | Performed by: INTERNAL MEDICINE

## 2022-01-21 PROCEDURE — G8427 DOCREV CUR MEDS BY ELIG CLIN: HCPCS | Performed by: INTERNAL MEDICINE

## 2022-01-21 PROCEDURE — 99214 OFFICE O/P EST MOD 30 MIN: CPT | Performed by: INTERNAL MEDICINE

## 2022-01-21 PROCEDURE — 3017F COLORECTAL CA SCREEN DOC REV: CPT | Performed by: INTERNAL MEDICINE

## 2022-01-21 RX ORDER — ALBUTEROL SULFATE 90 UG/1
2 AEROSOL, METERED RESPIRATORY (INHALATION) EVERY 6 HOURS PRN
Qty: 3 EACH | Refills: 3 | Status: SHIPPED | OUTPATIENT
Start: 2022-01-21 | End: 2022-04-19 | Stop reason: SDUPTHER

## 2022-01-21 ASSESSMENT — ENCOUNTER SYMPTOMS
VOMITING: 0
SINUS PRESSURE: 0
SHORTNESS OF BREATH: 1
COUGH: 0
TROUBLE SWALLOWING: 0
ABDOMINAL PAIN: 0
WHEEZING: 0
SORE THROAT: 0
VOICE CHANGE: 0
CHEST TIGHTNESS: 0
EYE ITCHING: 0
RHINORRHEA: 0
EYE DISCHARGE: 0
DIARRHEA: 0
NAUSEA: 0

## 2022-01-21 NOTE — PROGRESS NOTES
file   Occupational History    Not on file   Tobacco Use    Smoking status: Former Smoker     Packs/day: 0.50     Years: 30.00     Pack years: 15.00     Quit date: 2020     Years since quittin.0    Smokeless tobacco: Never Used   Substance and Sexual Activity    Alcohol use: Not on file    Drug use: No    Sexual activity: Yes   Other Topics Concern    Not on file   Social History Narrative    Not on file     Social Determinants of Health     Financial Resource Strain:     Difficulty of Paying Living Expenses: Not on file   Food Insecurity:     Worried About Running Out of Food in the Last Year: Not on file    Rukhsana of Food in the Last Year: Not on file   Transportation Needs:     Lack of Transportation (Medical): Not on file    Lack of Transportation (Non-Medical): Not on file   Physical Activity:     Days of Exercise per Week: Not on file    Minutes of Exercise per Session: Not on file   Stress:     Feeling of Stress : Not on file   Social Connections:     Frequency of Communication with Friends and Family: Not on file    Frequency of Social Gatherings with Friends and Family: Not on file    Attends Mu-ism Services: Not on file    Active Member of [address] [place] [address] or Organizations: Not on file    Attends Club or Organization Meetings: Not on file    Marital Status: Not on file   Intimate Partner Violence:     Fear of Current or Ex-Partner: Not on file    Emotionally Abused: Not on file    Physically Abused: Not on file    Sexually Abused: Not on file   Housing Stability:     Unable to Pay for Housing in the Last Year: Not on file    Number of [place] in the Last Year: Not on file    Unstable Housing in the Last Year: Not on file         Review of Systems   Constitutional: Negative for chills, diaphoresis, fatigue and fever.    HENT: Negative for congestion, mouth sores, nosebleeds, postnasal drip, rhinorrhea, sinus pressure, sneezing, sore throat, trouble swallowing and voice change. Eyes: Negative for discharge, itching and visual disturbance. Respiratory: Positive for shortness of breath. Negative for cough, chest tightness and wheezing. Cardiovascular: Negative for chest pain, palpitations and leg swelling. Gastrointestinal: Negative for abdominal pain, diarrhea, nausea and vomiting. Genitourinary: Negative for difficulty urinating and hematuria. Musculoskeletal: Negative for arthralgias, joint swelling and myalgias. Skin: Negative for rash. Allergic/Immunologic: Negative for environmental allergies and food allergies. Neurological: Negative for dizziness, tremors, weakness and headaches. Psychiatric/Behavioral: Positive for sleep disturbance. Negative for behavioral problems. :     Vitals:    01/21/22 1237   BP: 137/88   Pulse: 89   Temp: 98.2 °F (36.8 °C)   SpO2: 97%   Weight: 270 lb (122.5 kg)   Height: 5' 2\" (1.575 m)     Wt Readings from Last 3 Encounters:   01/21/22 270 lb (122.5 kg)   10/02/20 284 lb (128.8 kg)   09/10/20 285 lb (129.3 kg)         Physical Exam  Constitutional:       General: She is not in acute distress. Appearance: She is well-developed. She is obese. She is not diaphoretic. HENT:      Head: Normocephalic and atraumatic. Nose: Nose normal.   Eyes:      Pupils: Pupils are equal, round, and reactive to light. Neck:      Thyroid: No thyromegaly. Vascular: No JVD. Trachea: No tracheal deviation. Cardiovascular:      Rate and Rhythm: Normal rate and regular rhythm. Heart sounds: No murmur heard. No friction rub. No gallop. Pulmonary:      Effort: No respiratory distress. Breath sounds: No wheezing or rales. Chest:      Chest wall: No tenderness. Abdominal:      General: There is no distension. Tenderness: There is no abdominal tenderness. There is no rebound. Musculoskeletal:         General: Normal range of motion. Lymphadenopathy:      Cervical: No cervical adenopathy.    Skin: General: Skin is warm and dry. Neurological:      Mental Status: She is alert and oriented to person, place, and time. Coordination: Coordination normal.         Current Outpatient Medications   Medication Sig Dispense Refill    albuterol sulfate HFA (PROVENTIL HFA) 108 (90 Base) MCG/ACT inhaler Inhale 2 puffs into the lungs every 6 hours as needed for Wheezing 3 each 3    Dulaglutide (TRULICITY) 9.40 HT/0.9VE SOPN Inject 0.75mg subcutaneously once a week. 4 pen 3    Lancets MISC Test 2x daily 100 each 3    blood glucose monitor strips Test 2x daily 100 strip 3    blood glucose monitor kit and supplies Give 1 meter covered by insurance 1 kit 0    Respiratory Therapy Supplies [employer] New CPAP F30 full face mask and supplies 1 Device 0    levothyroxine (SYNTHROID) 125 MCG tablet       lisinopril (PRINIVIL;ZESTRIL) 20 MG tablet       aspirin 81 MG chewable tablet       OXYGEN New oxygen concentrator 2.5 lit with sleep 1 Units 0    CPAP Machine MISC by Does not apply route ADJUST CPAP SETTING TO 10 CM H2O 1 each 0    VORTIoxetine (TRINTELLIX) 10 MG TABS tablet Take 10 mg by mouth daily      Cholecalciferol (VITAMIN D3) 1000 units TABS       metFORMIN (GLUCOPHAGE) 500 MG tablet       omeprazole (PRILOSEC) 20 MG capsule Take 20 mg by mouth daily.  buPROPion (WELLBUTRIN XL) 300 MG XL tablet Take 300 mg by mouth every morning.  carvedilol (COREG) 25 MG tablet Take 25 mg by mouth 2 times daily (with meals).  diltiazem (CARDIZEM SR) 120 MG SR capsule Take 120 mg by mouth 2 times daily.  ibuprofen (ADVIL;MOTRIN) 800 MG tablet Take 800 mg by mouth every 6 hours as needed.  lansoprazole (PREVACID) 30 MG capsule Take 30 mg by mouth daily.  potassium chloride SA (K-DUR;KLOR-CON) 20 MEQ tablet Take 20 mEq by mouth 2 times daily.  oxybutynin (DITROPAN) 5 MG tablet Take 5 mg by mouth 3 times daily.  simvastatin (ZOCOR) 20 MG tablet Take 20 mg by mouth nightly. No current facility-administered medications for this visit. Results for orders placed during the hospital encounter of 12/20/17    XR CHEST STANDARD (2 VW)    Narrative  EXAMINATION: CHEST TWO VIEWS    CLINICAL HISTORY: J44.9 Chronic obstructive pulmonary disease, unspecified COPD type ([facility] [address] ICD10, follow-up    COMPARISONS: August 6, 2012    FINDINGS:    Two views of the chest are submitted. The cardiac silhouette is of normal size configuration. The mediastinum is unremarkable. Pulmonary vascular is attenuated, lungs are hyperinflated and there is some widening of the AP diameter the chest and coarsening of the interstitium. Right sided trachea. No focal infiltrates. No effusions. Pneumothoraces. Impression  NO ACUTE ACTIVE CARDIOPULMONARY PROCESS. RADIOGRAPHIC FINDINGS SUGGESTIVE OF COPD. CORRELATE CLINICALLY.  ]  No results found for this or any previous visit.  ]  No results found for this or any previous visit.  ]  Results for orders placed during the hospital encounter of 07/09/20    CT CHEST WO CONTRAST    Narrative  EXAMINATION:  CT SCAN CHEST    CLINICAL HISTORY:  Cough, short of breath. History of blood-tinged sputum. COMPARISON:  None    TECHNIQUE:  Multiple serial axial images of the chest from the base neck through the upper abdomen with both sagittal coronal reconstruction was performed without intravenous or oral administration of contrast.    FINDINGS:    Small area of atelectasis seen at the medial inferior aspect of the left lower lobe. There are no focal parenchymal abnormalities. No pleural effusions. No pneumothoraces. No gross central or proximal endobronchial abnormalities. No significant periaortic, pretracheal, parahilar or subcarinal adenopathy. Within the field-of-view of the abdomen the gallbladder surgically absent.     Visualized osseous structures show multilevel degenerative change with bridging osteophytes of the thoracic spine    Impression  Unremarkable CT scan the chest as described above      All CT scans at this facility use dose modulation, iterative reconstruction, and/or weight based dosing when appropriate to reduce radiation dose to as low as reasonably achievable.  ]  No results found for this or any previous visit.  ]    Assessment/Plan:     1. ALMA on CPAP  She is using CPAP with  10  centimeters of H2O with heated humidity and 2.5 lit . She is using CPAP for about   5-7  hours every night. She is using CPAP with  Full face  Mask. She said  sleep is restful with the CPAP use. She is compliant with CPAP therapy and benefiting with CPAP use. Continue CPAP therapy as before      I reviewed compliance report with patient regarding CPAP therapy. She is using  CPAP for 30 days out of 30 daysAverage usage of days used is 5 hours and 47 min , average AHI 1.2  with CPAP use. Counseling: CPAP/BiPAP uses, She advised to use CPAP at least 5-6 hours every night. Driving: She is advised for extreme caution when driving or operating machinery if there is a feeling of drowsiness, especially while driving it is preferable to stop driving and take a brief nap. Sleep hygiene:Avoid supine sleep, sleep on  sides. Avoid  sleep deprivation. Explained sleep hygiene. Advice to avoid Alcohol and sedative    Time spend over 30 min. Face to face. with greater than 50 % time with CPAP therapy including review compliance, counseling and advised regarding CPAP therapy. 2. Morbid obesity ([address]  She is advised try to lose weight. obesity related risk explained to the patient ,  Current weight:  270 lb (122.5 kg) Lbs. BMI:  Body mass index is 49.38 kg/m². Suggested weight control approaches, including dietary changes , exercise, behavioral modification. 3. Hypoxia  She is using 2.5 L O2 during sleep. Continue O2 to keep saturation 90% or above.     4. Chronic obstructive pulmonary disease, unspecified COPD type (HCC)  C/o shortness of breath with exertion. No  Wheezing. No Cough with  Sputum. No Hemoptysis. No Chest tightness. She is using bronchodilator with albuterol  HFA prn  but she is doing better and does not need to use it   .  - albuterol sulfate HFA (PROVENTIL HFA) 108 (90 Base) MCG/ACT inhaler; Inhale 2 puffs into the lungs every 6 hours as needed for Wheezing  Dispense: 3 each; Refill: 3      Return in about 5 months (around 6/21/2022) for lewis, COPD.       Darlynn Paget, MD

## 2022-04-18 DIAGNOSIS — J44.9 CHRONIC OBSTRUCTIVE PULMONARY DISEASE, UNSPECIFIED COPD TYPE (HCC): ICD-10-CM

## 2022-04-18 NOTE — TELEPHONE ENCOUNTER
Rx requested:  Requested Prescriptions     Pending Prescriptions Disp Refills    albuterol sulfate HFA (PROVENTIL HFA) 108 (90 Base) MCG/ACT inhaler 3 each 3     Sig: Inhale 2 puffs into the lungs every 6 hours as needed for Wheezing       Last Office Visit:   1/21/2022      Next Visit Date:  Future Appointments   Date Time Provider Marco Coleman   6/22/2022  2:00 PM Karen Valles MD [address] [place] [address]

## 2022-04-19 RX ORDER — ALBUTEROL SULFATE 90 UG/1
2 AEROSOL, METERED RESPIRATORY (INHALATION) EVERY 6 HOURS PRN
Qty: 3 EACH | Refills: 3 | Status: SHIPPED | OUTPATIENT
Start: 2022-04-19 | End: 2022-04-25 | Stop reason: SDUPTHER

## 2022-04-25 DIAGNOSIS — J44.9 CHRONIC OBSTRUCTIVE PULMONARY DISEASE, UNSPECIFIED COPD TYPE (HCC): ICD-10-CM

## 2022-04-25 RX ORDER — ALBUTEROL SULFATE 90 UG/1
2 AEROSOL, METERED RESPIRATORY (INHALATION) EVERY 6 HOURS PRN
Qty: 3 EACH | Refills: 3 | Status: SHIPPED | OUTPATIENT
Start: 2022-04-25 | End: 2022-06-28 | Stop reason: SDUPTHER

## 2022-04-25 NOTE — TELEPHONE ENCOUNTER
Rx requested:  Requested Prescriptions     Pending Prescriptions Disp Refills    albuterol sulfate HFA (PROVENTIL HFA) 108 (90 Base) MCG/ACT inhaler 3 each 3     Sig: Inhale 2 puffs into the lungs every 6 hours as needed for Wheezing       Last Office Visit:   1/21/2022      Next Visit Date:  Future Appointments   Date Time Provider Marco Coleman   6/22/2022  2:00 PM Ban Giron MD [facility]

## 2022-06-28 ENCOUNTER — OFFICE VISIT (OUTPATIENT)
Dept: PULMONOLOGY | Age: 57
End: 2022-06-28
Payer: MEDICAID

## 2022-06-28 VITALS
SYSTOLIC BLOOD PRESSURE: 119 MMHG | HEART RATE: 87 BPM | BODY MASS INDEX: 49.13 KG/M2 | HEIGHT: 62 IN | WEIGHT: 267 LBS | DIASTOLIC BLOOD PRESSURE: 63 MMHG | OXYGEN SATURATION: 93 %

## 2022-06-28 DIAGNOSIS — G47.33 OSA ON CPAP: ICD-10-CM

## 2022-06-28 DIAGNOSIS — J44.9 CHRONIC OBSTRUCTIVE PULMONARY DISEASE, UNSPECIFIED COPD TYPE (HCC): Primary | ICD-10-CM

## 2022-06-28 DIAGNOSIS — R09.02 HYPOXIA: ICD-10-CM

## 2022-06-28 DIAGNOSIS — Z99.89 OSA ON CPAP: ICD-10-CM

## 2022-06-28 DIAGNOSIS — E66.01 MORBID OBESITY (HCC): ICD-10-CM

## 2022-06-28 PROCEDURE — 1036F TOBACCO NON-USER: CPT | Performed by: INTERNAL MEDICINE

## 2022-06-28 PROCEDURE — G8427 DOCREV CUR MEDS BY ELIG CLIN: HCPCS | Performed by: INTERNAL MEDICINE

## 2022-06-28 PROCEDURE — 99214 OFFICE O/P EST MOD 30 MIN: CPT | Performed by: INTERNAL MEDICINE

## 2022-06-28 PROCEDURE — 3017F COLORECTAL CA SCREEN DOC REV: CPT | Performed by: INTERNAL MEDICINE

## 2022-06-28 PROCEDURE — G8417 CALC BMI ABV UP PARAM F/U: HCPCS | Performed by: INTERNAL MEDICINE

## 2022-06-28 PROCEDURE — 3023F SPIROM DOC REV: CPT | Performed by: INTERNAL MEDICINE

## 2022-06-28 RX ORDER — ALBUTEROL SULFATE 90 UG/1
2 AEROSOL, METERED RESPIRATORY (INHALATION) EVERY 6 HOURS PRN
Qty: 3 EACH | Refills: 3 | Status: SHIPPED | OUTPATIENT
Start: 2022-06-28 | End: 2022-08-31 | Stop reason: SDUPTHER

## 2022-06-28 ASSESSMENT — ENCOUNTER SYMPTOMS
COUGH: 0
SHORTNESS OF BREATH: 1
CHEST TIGHTNESS: 0
NAUSEA: 0
DIARRHEA: 0
RHINORRHEA: 0
VOMITING: 0
SORE THROAT: 0
TROUBLE SWALLOWING: 0
SINUS PRESSURE: 0
VOICE CHANGE: 0
EYE DISCHARGE: 0
WHEEZING: 0
EYE ITCHING: 0
ABDOMINAL PAIN: 0

## 2022-06-28 NOTE — PROGRESS NOTES
Subjective:     Stephany Marcial is a 62 y.o. female who complains today of:     Chief Complaint   Patient presents with    Sleep Apnea     5 month f/u       HPI  She is using CPAP with  10  centimeters of H2O with heated humidity and 2.5 lit O2  CPAP is about 15 yrs old. Holly Coles using CPAP for about   5-7  hours every night. She is using CPAP with  Full face  Mask. She said  sleep is restful with the CPAP use. She is compliant with CPAP therapy and benefiting with CPAP use. No snoring with CPAP use. No complaint of daytime sleepiness or tiredness with CPAP use. She denies taking naps. She denies difficulty falling asleep or staying asleep.      I reviewed compliance report with patient regarding CPAP therapy. She is using  CPAP for 30 days out of 30 daysAverage usage of days used is 5 hours and 47 min , average AHI 1.2  with CPAP use.          C/o shortness of breath with exertion.   No  Wheezing. No Cough with Sputum. No Hemoptysis. No Chest tightness. No Chest pain with radiation  or pleuritic pain. No  leg edema. No Fever or chills. No Rhinorrhea and postnasal drip.   She is using bronchodilator with albuterol  HFA prn       Allergies:  Pcn [penicillins] and Sulfa antibiotics  Past Medical History:   Diagnosis Date    Depression     Depression     GERD (gastroesophageal reflux disease)     Hypercholesteremia     Hypertension     Hypothyroidism     Lung disease     ALMA on CPAP      Past Surgical History:   Procedure Laterality Date    CARDIAC CATHETERIZATION      CHOLECYSTECTOMY      CYST REMOVAL      JOINT REPLACEMENT      KNEE CARTILAGE SURGERY      TUBAL LIGATION       Family History   Problem Relation Age of Onset    Heart Failure Mother      Social History     Socioeconomic History    Marital status:      Spouse name: Not on file    Number of children: Not on file    Years of education: Not on file    Highest education level: Not on file   Occupational History    Not on file   Tobacco Use    Smoking status: Former Smoker     Packs/day: 0.50     Years: 30.00     Pack years: 15.00     Quit date: 2020     Years since quittin.4    Smokeless tobacco: Never Used   Substance and Sexual Activity    Alcohol use: Not on file    Drug use: No    Sexual activity: Yes   Other Topics Concern    Not on file   Social History Narrative    Not on file     Social Determinants of Health     Financial Resource Strain:     Difficulty of Paying Living Expenses: Not on file   Food Insecurity:     Worried About Running Out of Food in the Last Year: Not on file    Rukhsana of Food in the Last Year: Not on file   Transportation Needs:     Lack of Transportation (Medical): Not on file    Lack of Transportation (Non-Medical): Not on file   Physical Activity:     Days of Exercise per Week: Not on file    Minutes of Exercise per Session: Not on file   Stress:     Feeling of Stress : Not on file   Social Connections:     Frequency of Communication with Friends and Family: Not on file    Frequency of Social Gatherings with Friends and Family: Not on file    Attends Mandaeism Services: Not on file    Active Member of [address] [place] [address] or Organizations: Not on file    Attends Club or Organization Meetings: Not on file    Marital Status: Not on file   Intimate Partner Violence:     Fear of Current or Ex-Partner: Not on file    Emotionally Abused: Not on file    Physically Abused: Not on file    Sexually Abused: Not on file   Housing Stability:     Unable to Pay for Housing in the Last Year: Not on file    Number of [place] in the Last Year: Not on file    Unstable Housing in the Last Year: Not on file         Review of Systems   Constitutional: Negative for chills, diaphoresis, fatigue and fever. HENT: Negative for congestion, mouth sores, nosebleeds, postnasal drip, rhinorrhea, sinus pressure, sneezing, sore throat, trouble swallowing and voice change.     Eyes: Negative for discharge, itching and visual disturbance. Respiratory: Positive for shortness of breath. Negative for cough, chest tightness and wheezing. Cardiovascular: Negative for chest pain, palpitations and leg swelling. Gastrointestinal: Negative for abdominal pain, diarrhea, nausea and vomiting. Genitourinary: Negative for difficulty urinating and hematuria. Musculoskeletal: Negative for arthralgias, joint swelling and myalgias. Skin: Negative for rash. Allergic/Immunologic: Negative for environmental allergies and food allergies. Neurological: Negative for dizziness, tremors, weakness and headaches. Psychiatric/Behavioral: Positive for sleep disturbance. Negative for behavioral problems. :     Vitals:    06/28/22 1502   BP: 119/63   Pulse: 87   SpO2: 93%   Weight: 267 lb (121.1 kg)   Height: 5' 2\" (1.575 m)     Wt Readings from Last 3 Encounters:   06/28/22 267 lb (121.1 kg)   01/21/22 270 lb (122.5 kg)   10/02/20 284 lb (128.8 kg)         Physical Exam  Constitutional:       General: She is not in acute distress. Appearance: She is well-developed. She is obese. She is not diaphoretic. HENT:      Head: Normocephalic and atraumatic. Nose: Nose normal.   Eyes:      Pupils: Pupils are equal, round, and reactive to light. Neck:      Thyroid: No thyromegaly. Vascular: No JVD. Trachea: No tracheal deviation. Cardiovascular:      Rate and Rhythm: Normal rate and regular rhythm. Heart sounds: No murmur heard. No friction rub. No gallop. Pulmonary:      Effort: No respiratory distress. Breath sounds: No wheezing or rales. Comments: diminished Breath sound bilaterally. Chest:      Chest wall: No tenderness. Abdominal:      General: There is no distension. Tenderness: There is no abdominal tenderness. There is no rebound. Musculoskeletal:         General: Normal range of motion. Lymphadenopathy:      Cervical: No cervical adenopathy.    Skin:     General: Skin is warm and dry. Neurological:      Mental Status: She is alert and oriented to person, place, and time. Coordination: Coordination normal.         Current Outpatient Medications   Medication Sig Dispense Refill    albuterol sulfate HFA (PROVENTIL HFA) 108 (90 Base) MCG/ACT inhaler Inhale 2 puffs into the lungs every 6 hours as needed for Wheezing 3 each 3    Dulaglutide (TRULICITY) 9.92 UZ/9.0LR SOPN Inject 0.75mg subcutaneously once a week. 4 pen 3    Lancets MISC Test 2x daily 100 each 3    blood glucose monitor strips Test 2x daily 100 strip 3    blood glucose monitor kit and supplies Give 1 meter covered by insurance 1 kit 0    Respiratory Therapy Supplies ASIA New CPAP F30 full face mask and supplies 1 Device 0    levothyroxine (SYNTHROID) 125 MCG tablet       lisinopril (PRINIVIL;ZESTRIL) 20 MG tablet       aspirin 81 MG chewable tablet       OXYGEN New oxygen concentrator 2.5 lit with sleep 1 Units 0    CPAP Machine MISC by Does not apply route ADJUST CPAP SETTING TO 10 CM H2O 1 each 0    VORTIoxetine (TRINTELLIX) 10 MG TABS tablet Take 10 mg by mouth daily      Cholecalciferol (VITAMIN D3) 1000 units TABS       metFORMIN (GLUCOPHAGE) 500 MG tablet       omeprazole (PRILOSEC) 20 MG capsule Take 20 mg by mouth daily.  buPROPion (WELLBUTRIN XL) 300 MG XL tablet Take 300 mg by mouth every morning.  carvedilol (COREG) 25 MG tablet Take 25 mg by mouth 2 times daily (with meals).  diltiazem (CARDIZEM SR) 120 MG SR capsule Take 120 mg by mouth 2 times daily.  lansoprazole (PREVACID) 30 MG capsule Take 30 mg by mouth daily.  potassium chloride SA (K-DUR;KLOR-CON) 20 MEQ tablet Take 20 mEq by mouth 2 times daily.  oxybutynin (DITROPAN) 5 MG tablet Take 5 mg by mouth 3 times daily.  ibuprofen (ADVIL;MOTRIN) 800 MG tablet Take 800 mg by mouth every 6 hours as needed.  simvastatin (ZOCOR) 20 MG tablet Take 20 mg by mouth nightly.          No scan the chest as described above      All CT scans at this facility use dose modulation, iterative reconstruction, and/or weight based dosing when appropriate to reduce radiation dose to as low as reasonably achievable. Assessment/Plan:     1. Chronic obstructive pulmonary disease, unspecified COPD type (HCC)    C/o shortness of breath with exertion. No  Wheezing. No Cough with Sputum. No Chest tightness. No Chest pain with radiation  or pleuritic pain. She is using bronchodilator with albuterol  HFA prn.continue bronchodilator as before  - albuterol sulfate HFA (PROVENTIL HFA) 108 (90 Base) MCG/ACT inhaler; Inhale 2 puffs into the lungs every 6 hours as needed for Wheezing  Dispense: 3 each; Refill: 3    2. ALMA on CPAP  She is using CPAP with  10  centimeters of H2O with heated humidity and 2.5 lit O2  CPAP is about 15 yrs old. Severa Coombes using CPAP for about   5-7  hours every night. She is using CPAP with  Full face  Mask. She said  sleep is restful with the CPAP use. She is compliant with CPAP therapy and benefiting with CPAP use. No snoring with CPAP use. No complaint of daytime sleepiness or tiredness with CPAP use. will request new CPAP     - New CPAP with 10 cm and 2.5 lit O2 bled    Counseling: CPAP/BiPAP uses, She advised to use CPAP at least 5-6 hours every night. Driving: She is advised for extreme caution when driving or operating machinery if there is a feeling of drowsiness, especially while driving it is preferable to stop driving and take a brief nap. Sleep hygiene:Avoid supine sleep, sleep on  sides. Avoid  sleep deprivation. Explained sleep hygiene. Advice to avoid Alcohol and sedative    3. Morbid obesity ([address]  She is advised try to lose weight. obesity related risk explained to the patient ,  Current weight:  267 lb (121.1 kg) Lbs. BMI:  Body mass index is 48.83 kg/m². Suggested weight control approaches, including dietary changes , exercise, behavioral modification.     4. Hypoxia  She is on 2.5 lit O2 bled with CPAP , continue same. Return in about 6 months (around 12/28/2022) for lewis, shortness of breath.       Vinod Elliott MD

## 2022-07-08 ENCOUNTER — TELEPHONE (OUTPATIENT)
Dept: PULMONOLOGY | Age: 57
End: 2022-07-08

## 2022-08-11 ENCOUNTER — OFFICE VISIT (OUTPATIENT)
Dept: PULMONOLOGY | Age: 57
End: 2022-08-11
Payer: MEDICAID

## 2022-08-11 VITALS
WEIGHT: 262 LBS | HEIGHT: 62 IN | HEART RATE: 76 BPM | BODY MASS INDEX: 48.21 KG/M2 | TEMPERATURE: 97.8 F | DIASTOLIC BLOOD PRESSURE: 61 MMHG | OXYGEN SATURATION: 94 % | SYSTOLIC BLOOD PRESSURE: 119 MMHG

## 2022-08-11 DIAGNOSIS — J44.9 CHRONIC OBSTRUCTIVE PULMONARY DISEASE, UNSPECIFIED COPD TYPE (HCC): Primary | ICD-10-CM

## 2022-08-11 DIAGNOSIS — R09.02 HYPOXIA: ICD-10-CM

## 2022-08-11 DIAGNOSIS — E66.01 MORBID OBESITY (HCC): ICD-10-CM

## 2022-08-11 DIAGNOSIS — G47.33 OSA ON CPAP: ICD-10-CM

## 2022-08-11 DIAGNOSIS — Z99.89 OSA ON CPAP: ICD-10-CM

## 2022-08-11 PROCEDURE — G8427 DOCREV CUR MEDS BY ELIG CLIN: HCPCS | Performed by: INTERNAL MEDICINE

## 2022-08-11 PROCEDURE — G8417 CALC BMI ABV UP PARAM F/U: HCPCS | Performed by: INTERNAL MEDICINE

## 2022-08-11 PROCEDURE — 1036F TOBACCO NON-USER: CPT | Performed by: INTERNAL MEDICINE

## 2022-08-11 PROCEDURE — 99214 OFFICE O/P EST MOD 30 MIN: CPT | Performed by: INTERNAL MEDICINE

## 2022-08-11 PROCEDURE — 3023F SPIROM DOC REV: CPT | Performed by: INTERNAL MEDICINE

## 2022-08-11 PROCEDURE — 3017F COLORECTAL CA SCREEN DOC REV: CPT | Performed by: INTERNAL MEDICINE

## 2022-08-11 ASSESSMENT — ENCOUNTER SYMPTOMS
RHINORRHEA: 0
SINUS PRESSURE: 0
EYE DISCHARGE: 0
DIARRHEA: 0
EYE ITCHING: 0
SHORTNESS OF BREATH: 1
TROUBLE SWALLOWING: 0
WHEEZING: 0
VOMITING: 0
ABDOMINAL PAIN: 0
VOICE CHANGE: 0
CHEST TIGHTNESS: 0
NAUSEA: 0
SORE THROAT: 0
COUGH: 0

## 2022-08-11 NOTE — PROGRESS NOTES
Subjective:     Payton Kelly is a 62 y.o. female who complains today of:     Chief Complaint   Patient presents with    Follow-up     Compliance ALMA       HPI  She is using CPAP with  10  centimeters of H2O with heated humidity and 2.5 lit O2  She got new CPAP since last visit. She is using CPAP for about 6 hours every night. She is using CPAP with  Full face  Mask. She said  sleep is restful with the CPAP use. She is compliant with CPAP therapy and benefiting with CPAP use. No snoring with CPAP use. No complaint of daytime sleepiness or tiredness with CPAP use. She denies taking naps. She denies difficulty falling asleep or staying asleep. C/o shortness of breath with exertion. No  Wheezing. No Cough with Sputum. No Hemoptysis. No Chest tightness. No Chest pain with radiation  or pleuritic pain. No  leg edema. No Fever or chills. No Rhinorrhea and postnasal drip.   She is using bronchodilator with albuterol  HFA prn        Allergies:  Pcn [penicillins] and Sulfa antibiotics  Past Medical History:   Diagnosis Date    Depression     Depression     GERD (gastroesophageal reflux disease)     Hypercholesteremia     Hypertension     Hypothyroidism     Lung disease     ALMA on CPAP      Past Surgical History:   Procedure Laterality Date    CARDIAC CATHETERIZATION      CHOLECYSTECTOMY      CYST REMOVAL      JOINT REPLACEMENT      KNEE CARTILAGE SURGERY      TUBAL LIGATION       Family History   Problem Relation Age of Onset    Heart Failure Mother      Social History     Socioeconomic History    Marital status:      Spouse name: Not on file    Number of children: Not on file    Years of education: Not on file    Highest education level: Not on file   Occupational History    Not on file   Tobacco Use    Smoking status: Former     Packs/day: 0.50     Years: 30.00     Pack years: 15.00     Types: Cigarettes     Quit date: 2020     Years since quittin.5     Passive exposure: Past Smokeless tobacco: Never   Vaping Use    Vaping Use: Never used   Substance and Sexual Activity    Alcohol use: Not Currently    Drug use: No    Sexual activity: Yes   Other Topics Concern    Not on file   Social History Narrative    Not on file     Social Determinants of Health     Financial Resource Strain: Not on file   Food Insecurity: Not on file   Transportation Needs: Not on file   Physical Activity: Not on file   Stress: Not on file   Social Connections: Not on file   Intimate Partner Violence: Not on file   Housing Stability: Not on file         Review of Systems   Constitutional:  Negative for chills, diaphoresis, fatigue and fever. HENT:  Negative for congestion, mouth sores, nosebleeds, postnasal drip, rhinorrhea, sinus pressure, sneezing, sore throat, trouble swallowing and voice change. Eyes:  Negative for discharge, itching and visual disturbance. Respiratory:  Positive for shortness of breath. Negative for cough, chest tightness and wheezing. Cardiovascular:  Negative for chest pain, palpitations and leg swelling. Gastrointestinal:  Negative for abdominal pain, diarrhea, nausea and vomiting. Genitourinary:  Negative for difficulty urinating and hematuria. Musculoskeletal:  Negative for arthralgias, joint swelling and myalgias. Skin:  Negative for rash. Allergic/Immunologic: Negative for environmental allergies and food allergies. Neurological:  Negative for dizziness, tremors, weakness and headaches. Psychiatric/Behavioral:  Positive for sleep disturbance. Negative for behavioral problems.         :     Vitals:    08/11/22 1409   BP: 119/61   Site: Right Upper Arm   Position: Sitting   Cuff Size: Large Adult   Pulse: 76   Temp: 97.8 °F (36.6 °C)   TempSrc: Temporal   SpO2: 94%   Weight: 262 lb (118.8 kg)   Height: 5' 2\" (1.575 m)     Wt Readings from Last 3 Encounters:   08/11/22 262 lb (118.8 kg)   06/28/22 267 lb (121.1 kg)   01/21/22 270 lb (122.5 kg)         Physical Exam  Constitutional:       General: She is not in acute distress. Appearance: She is well-developed. She is not diaphoretic. HENT:      Head: Normocephalic and atraumatic. Nose: Nose normal.   Eyes:      Pupils: Pupils are equal, round, and reactive to light. Neck:      Thyroid: No thyromegaly. Vascular: No JVD. Trachea: No tracheal deviation. Cardiovascular:      Rate and Rhythm: Normal rate and regular rhythm. Heart sounds: No murmur heard. No friction rub. No gallop. Pulmonary:      Effort: No respiratory distress. Breath sounds: No wheezing or rales. Comments: diminished Breath sound bilaterally. Chest:      Chest wall: No tenderness. Abdominal:      General: There is no distension. Tenderness: There is no abdominal tenderness. There is no rebound. Musculoskeletal:         General: Normal range of motion. Lymphadenopathy:      Cervical: No cervical adenopathy. Skin:     General: Skin is warm and dry. Neurological:      Mental Status: She is alert and oriented to person, place, and time. Coordination: Coordination normal.   Psychiatric:         Mood and Affect: Mood normal.         Behavior: Behavior normal.       Current Outpatient Medications   Medication Sig Dispense Refill    Rosuvastatin Calcium 20 MG CPSP 1 tablet      VORTIoxetine (TRINTELLIX) 10 MG TABS tablet 1 tablet      albuterol sulfate HFA (PROVENTIL HFA) 108 (90 Base) MCG/ACT inhaler Inhale 2 puffs into the lungs every 6 hours as needed for Wheezing 3 each 3    CPAP Machine MISC by Does not apply route New CPAP with 10 cm and 2.5 lit O2 bled 1 each 0    Dulaglutide (TRULICITY) 1.95 OV/8.6PS SOPN Inject 0.75mg subcutaneously once a week.  4 pen 3    Lancets MISC Test 2x daily 100 each 3    blood glucose monitor strips Test 2x daily 100 strip 3    blood glucose monitor kit and supplies Give 1 meter covered by insurance 1 kit 0    Respiratory Therapy Supplies ASIA New CPAP F30 full face mask and supplies 1 Device 0    levothyroxine (SYNTHROID) 125 MCG tablet       lisinopril (PRINIVIL;ZESTRIL) 20 MG tablet       aspirin 81 MG chewable tablet       OXYGEN New oxygen concentrator 2.5 lit with sleep 1 Units 0    CPAP Machine MISC by Does not apply route ADJUST CPAP SETTING TO 10 CM H2O 1 each 0    Cholecalciferol (VITAMIN D3) 1000 units TABS       metFORMIN (GLUCOPHAGE) 500 MG tablet       omeprazole (PRILOSEC) 20 MG capsule Take 20 mg by mouth daily. buPROPion (WELLBUTRIN XL) 300 MG XL tablet Take 300 mg by mouth every morning. carvedilol (COREG) 25 MG tablet Take 25 mg by mouth 2 times daily (with meals). diltiazem (CARDIZEM SR) 120 MG SR capsule Take 120 mg by mouth 2 times daily. potassium chloride SA (K-DUR;KLOR-CON) 20 MEQ tablet Take 20 mEq by mouth 2 times daily. oxybutynin (DITROPAN) 5 MG tablet Take 5 mg by mouth 3 times daily. ibuprofen (ADVIL;MOTRIN) 800 MG tablet Take 800 mg by mouth every 6 hours as needed. simvastatin (ZOCOR) 20 MG tablet Take 20 mg by mouth nightly. No current facility-administered medications for this visit. Results for orders placed during the hospital encounter of 12/20/17    XR CHEST STANDARD (2 VW)    Narrative  EXAMINATION: CHEST TWO VIEWS    CLINICAL HISTORY: J44.9 Chronic obstructive pulmonary disease, unspecified COPD type ([facility] [address] ICD10, follow-up    COMPARISONS: August 6, 2012    FINDINGS:    Two views of the chest are submitted. The cardiac silhouette is of normal size configuration. The mediastinum is unremarkable. Pulmonary vascular is attenuated, lungs are hyperinflated and there is some widening of the AP diameter the chest and coarsening of the interstitium. Right sided trachea. No focal infiltrates. No effusions. Pneumothoraces. Impression  NO ACUTE ACTIVE CARDIOPULMONARY PROCESS. RADIOGRAPHIC FINDINGS SUGGESTIVE OF COPD.   CORRELATE CLINICALLY.  ]  No results found for this or any CPAP/BiPAP uses, She advised to use CPAP at least 5-6 hours every night. Driving: She is advised for extreme caution when driving or operating machinery if there is a feeling of drowsiness, especially while driving it is preferable to stop driving and take a brief nap. Sleep hygiene:Avoid supine sleep, sleep on  sides. Avoid  sleep deprivation. Explained sleep hygiene. Advice to avoid Alcohol and sedative    3. Morbid obesity ([address]  She is advised try to lose weight. obesity related risk explained to the patient ,  Current weight:  262 lb (118.8 kg) Lbs. BMI:  Body mass index is 47.92 kg/m². Suggested weight control approaches, including dietary changes , exercise, behavioral modification. 4. Hypoxia  She is on 2.5 L O2 during sleep with CPAP therapy. Continue O2 to keep saturation 90% or above    Return in about 4 months (around 12/11/2022) for lewis, shortness of breath.       Maida Barnhart MD

## 2022-08-31 DIAGNOSIS — J44.9 CHRONIC OBSTRUCTIVE PULMONARY DISEASE, UNSPECIFIED COPD TYPE (HCC): ICD-10-CM

## 2022-08-31 RX ORDER — ALBUTEROL SULFATE 90 UG/1
2 AEROSOL, METERED RESPIRATORY (INHALATION) EVERY 6 HOURS PRN
Qty: 3 EACH | Refills: 3 | Status: SHIPPED | OUTPATIENT
Start: 2022-08-31

## 2023-04-06 LAB
CHOLESTEROL (MG/DL) IN SER/PLAS: 104 MG/DL (ref 0–199)
CHOLESTEROL IN HDL (MG/DL) IN SER/PLAS: 39.4 MG/DL
CHOLESTEROL/HDL RATIO: 2.6
CHOLESTEROL/HDL RATIO: 2.6
CHOLESTEROL: 104 MG/DL (ref 0–199)
ESTIMATED AVERAGE GLUCOSE FOR HBA1C: 126 MG/DL
ESTIMATED AVERAGE GLUCOSE: 126 MG/DL
HBA1C MFR BLD: 6 %
HDLC SERPL-MCNC: 39.4 MG/DL
HEMOGLOBIN A1C/HEMOGLOBIN TOTAL IN BLOOD: 6 %
LDL CHOLESTEROL: 30 MG/DL (ref 0–99)
LDL: 30 MG/DL (ref 0–99)
TRIGL SERPL-MCNC: 175 MG/DL (ref 0–149)
TRIGLYCERIDE (MG/DL) IN SER/PLAS: 175 MG/DL (ref 0–149)
VLDL: 35 MG/DL (ref 0–40)
VLDLC SERPL CALC-MCNC: 35 MG/DL (ref 0–40)

## 2023-07-11 LAB
ALANINE AMINOTRANSFERASE (SGPT) (U/L) IN SER/PLAS: 31 U/L (ref 7–45)
ALBUMIN (G/DL) IN SER/PLAS: 4 G/DL (ref 3.4–5)
ALBUMIN (MG/L) IN URINE: 21.4 MG/L
ALBUMIN/CREATININE (UG/MG) IN URINE: 10.9 UG/MG CRT (ref 0–30)
ALKALINE PHOSPHATASE (U/L) IN SER/PLAS: 76 U/L (ref 33–110)
ANION GAP IN SER/PLAS: 12 MMOL/L (ref 10–20)
ASPARTATE AMINOTRANSFERASE (SGOT) (U/L) IN SER/PLAS: 20 U/L (ref 9–39)
BILIRUBIN TOTAL (MG/DL) IN SER/PLAS: 0.4 MG/DL (ref 0–1.2)
CALCIDIOL (25 OH VITAMIN D3) (NG/ML) IN SER/PLAS: 39 NG/ML
CALCIUM (MG/DL) IN SER/PLAS: 8.9 MG/DL (ref 8.6–10.3)
CARBON DIOXIDE, TOTAL (MMOL/L) IN SER/PLAS: 25 MMOL/L (ref 21–32)
CHLORIDE (MMOL/L) IN SER/PLAS: 110 MMOL/L (ref 98–107)
CHOLESTEROL (MG/DL) IN SER/PLAS: 101 MG/DL (ref 0–199)
CHOLESTEROL IN HDL (MG/DL) IN SER/PLAS: 39.2 MG/DL
CHOLESTEROL/HDL RATIO: 2.6
CREATININE (MG/DL) IN SER/PLAS: 1.09 MG/DL (ref 0.5–1.05)
CREATININE (MG/DL) IN URINE: 196 MG/DL (ref 20–320)
ERYTHROCYTE DISTRIBUTION WIDTH (RATIO) BY AUTOMATED COUNT: 13.7 % (ref 11.5–14.5)
ERYTHROCYTE MEAN CORPUSCULAR HEMOGLOBIN CONCENTRATION (G/DL) BY AUTOMATED: 32.1 G/DL (ref 32–36)
ERYTHROCYTE MEAN CORPUSCULAR VOLUME (FL) BY AUTOMATED COUNT: 93 FL (ref 80–100)
ERYTHROCYTES (10*6/UL) IN BLOOD BY AUTOMATED COUNT: 4.2 X10E12/L (ref 4–5.2)
ESTIMATED AVERAGE GLUCOSE FOR HBA1C: 120 MG/DL
GFR FEMALE: 59 ML/MIN/1.73M2
GLUCOSE (MG/DL) IN SER/PLAS: 113 MG/DL (ref 74–99)
HEMATOCRIT (%) IN BLOOD BY AUTOMATED COUNT: 38.9 % (ref 36–46)
HEMOGLOBIN (G/DL) IN BLOOD: 12.5 G/DL (ref 12–16)
HEMOGLOBIN A1C/HEMOGLOBIN TOTAL IN BLOOD: 5.8 %
LDL: 28 MG/DL (ref 0–99)
LEUKOCYTES (10*3/UL) IN BLOOD BY AUTOMATED COUNT: 7.2 X10E9/L (ref 4.4–11.3)
PLATELETS (10*3/UL) IN BLOOD AUTOMATED COUNT: 185 X10E9/L (ref 150–450)
POTASSIUM (MMOL/L) IN SER/PLAS: 4.3 MMOL/L (ref 3.5–5.3)
PROTEIN TOTAL: 7.2 G/DL (ref 6.4–8.2)
SODIUM (MMOL/L) IN SER/PLAS: 143 MMOL/L (ref 136–145)
TRIGLYCERIDE (MG/DL) IN SER/PLAS: 168 MG/DL (ref 0–149)
UREA NITROGEN (MG/DL) IN SER/PLAS: 19 MG/DL (ref 6–23)
VLDL: 34 MG/DL (ref 0–40)

## 2023-10-17 ENCOUNTER — APPOINTMENT (OUTPATIENT)
Dept: ORTHOPEDIC SURGERY | Facility: CLINIC | Age: 58
End: 2023-10-17
Payer: COMMERCIAL

## 2023-10-24 DIAGNOSIS — E11.9 TYPE 2 DIABETES MELLITUS WITHOUT COMPLICATION, UNSPECIFIED WHETHER LONG TERM INSULIN USE (MULTI): ICD-10-CM

## 2023-10-24 RX ORDER — DULAGLUTIDE 1.5 MG/.5ML
1.5 INJECTION, SOLUTION SUBCUTANEOUS
COMMUNITY
Start: 2023-09-13 | End: 2023-10-24 | Stop reason: SDUPTHER

## 2023-10-24 RX ORDER — DULAGLUTIDE 1.5 MG/.5ML
1.5 INJECTION, SOLUTION SUBCUTANEOUS
Qty: 1.5 ML | Refills: 1 | Status: SHIPPED | OUTPATIENT
Start: 2023-10-24 | End: 2023-11-20

## 2023-11-20 DIAGNOSIS — E11.9 TYPE 2 DIABETES MELLITUS WITHOUT COMPLICATION, UNSPECIFIED WHETHER LONG TERM INSULIN USE (MULTI): ICD-10-CM

## 2023-11-20 RX ORDER — DULAGLUTIDE 1.5 MG/.5ML
INJECTION, SOLUTION SUBCUTANEOUS
Qty: 2 ML | Refills: 0 | Status: SHIPPED | OUTPATIENT
Start: 2023-11-20 | End: 2024-01-29 | Stop reason: SDUPTHER

## 2024-01-03 ENCOUNTER — LAB (OUTPATIENT)
Dept: LAB | Facility: LAB | Age: 59
End: 2024-01-03
Payer: COMMERCIAL

## 2024-01-03 DIAGNOSIS — E55.9 VITAMIN D DEFICIENCY, UNSPECIFIED: ICD-10-CM

## 2024-01-03 DIAGNOSIS — E11.21 TYPE 2 DIABETES MELLITUS WITH DIABETIC NEPHROPATHY (MULTI): Primary | ICD-10-CM

## 2024-01-03 DIAGNOSIS — N18.2 CHRONIC KIDNEY DISEASE, STAGE 2 (MILD): ICD-10-CM

## 2024-01-03 LAB
25(OH)D3 SERPL-MCNC: 30 NG/ML (ref 30–100)
ALBUMIN SERPL BCP-MCNC: 3.8 G/DL (ref 3.4–5)
ANION GAP SERPL CALC-SCNC: 14 MMOL/L (ref 10–20)
BUN SERPL-MCNC: 12 MG/DL (ref 6–23)
CALCIUM SERPL-MCNC: 8.5 MG/DL (ref 8.6–10.3)
CHLORIDE SERPL-SCNC: 109 MMOL/L (ref 98–107)
CO2 SERPL-SCNC: 25 MMOL/L (ref 21–32)
CREAT SERPL-MCNC: 0.94 MG/DL (ref 0.5–1.05)
ERYTHROCYTE [DISTWIDTH] IN BLOOD BY AUTOMATED COUNT: 13.6 % (ref 11.5–14.5)
EST. AVERAGE GLUCOSE BLD GHB EST-MCNC: 117 MG/DL
GFR SERPL CREATININE-BSD FRML MDRD: 70 ML/MIN/1.73M*2
GLUCOSE SERPL-MCNC: 107 MG/DL (ref 74–99)
HBA1C MFR BLD: 5.7 %
HCT VFR BLD AUTO: 38.4 % (ref 36–46)
HGB BLD-MCNC: 12.6 G/DL (ref 12–16)
MCH RBC QN AUTO: 30.4 PG (ref 26–34)
MCHC RBC AUTO-ENTMCNC: 32.8 G/DL (ref 32–36)
MCV RBC AUTO: 93 FL (ref 80–100)
NRBC BLD-RTO: 0 /100 WBCS (ref 0–0)
PHOSPHATE SERPL-MCNC: 3.8 MG/DL (ref 2.5–4.9)
PLATELET # BLD AUTO: 184 X10*3/UL (ref 150–450)
POTASSIUM SERPL-SCNC: 3.8 MMOL/L (ref 3.5–5.3)
RBC # BLD AUTO: 4.14 X10*6/UL (ref 4–5.2)
SODIUM SERPL-SCNC: 144 MMOL/L (ref 136–145)
TSH SERPL-ACNC: 0.69 MIU/L (ref 0.44–3.98)
WBC # BLD AUTO: 7.5 X10*3/UL (ref 4.4–11.3)

## 2024-01-03 PROCEDURE — 84443 ASSAY THYROID STIM HORMONE: CPT

## 2024-01-03 PROCEDURE — 85027 COMPLETE CBC AUTOMATED: CPT

## 2024-01-03 PROCEDURE — 80069 RENAL FUNCTION PANEL: CPT

## 2024-01-03 PROCEDURE — 36415 COLL VENOUS BLD VENIPUNCTURE: CPT

## 2024-01-03 PROCEDURE — 83036 HEMOGLOBIN GLYCOSYLATED A1C: CPT

## 2024-01-03 PROCEDURE — 82306 VITAMIN D 25 HYDROXY: CPT

## 2024-01-04 PROBLEM — I42.9 CARDIOMYOPATHY (MULTI): Chronic | Status: ACTIVE | Noted: 2021-11-09

## 2024-01-04 PROBLEM — J44.9 CHRONIC OBSTRUCTIVE PULMONARY DISEASE (MULTI): Chronic | Status: ACTIVE | Noted: 2017-10-25

## 2024-01-04 PROBLEM — I47.29 PAROXYSMAL VENTRICULAR TACHYCARDIA (MULTI): Status: ACTIVE | Noted: 2022-02-02

## 2024-01-04 PROBLEM — I10 PRIMARY HYPERTENSION: Status: ACTIVE | Noted: 2024-01-04

## 2024-01-04 PROBLEM — E66.01 MORBID OBESITY (MULTI): Status: ACTIVE | Noted: 2019-06-17

## 2024-01-04 PROBLEM — E78.5 HYPERLIPIDEMIA: Status: ACTIVE | Noted: 2024-01-04

## 2024-01-04 PROBLEM — I49.1 ATRIAL PREMATURE DEPOLARIZATION: Status: ACTIVE | Noted: 2022-02-02

## 2024-01-04 PROBLEM — Z99.81 CHRONIC RESPIRATORY FAILURE WITH HYPOXIA, ON HOME OXYGEN THERAPY (MULTI): Status: ACTIVE | Noted: 2019-06-17

## 2024-01-04 PROBLEM — I47.20 PAROXYSMAL VENTRICULAR TACHYCARDIA: Status: ACTIVE | Noted: 2022-02-02

## 2024-01-04 PROBLEM — J96.11 CHRONIC RESPIRATORY FAILURE WITH HYPOXIA, ON HOME OXYGEN THERAPY (MULTI): Status: ACTIVE | Noted: 2019-06-17

## 2024-01-04 PROBLEM — I49.9 CARDIAC ARRHYTHMIA, UNSPECIFIED: Status: ACTIVE | Noted: 2022-02-02

## 2024-01-04 PROBLEM — R09.02 HYPOXIA: Status: ACTIVE | Noted: 2019-06-17

## 2024-01-04 RX ORDER — DILTIAZEM HYDROCHLORIDE 120 MG/1
120 CAPSULE, EXTENDED RELEASE ORAL DAILY
COMMUNITY
Start: 2015-11-23

## 2024-01-04 RX ORDER — BUPROPION HYDROCHLORIDE 300 MG/1
300 TABLET ORAL
COMMUNITY

## 2024-01-04 RX ORDER — ROSUVASTATIN CALCIUM 20 MG/1
TABLET, COATED ORAL
COMMUNITY
Start: 2021-01-23 | End: 2024-01-29 | Stop reason: SDUPTHER

## 2024-01-04 RX ORDER — OMEPRAZOLE 40 MG/1
CAPSULE, DELAYED RELEASE ORAL
COMMUNITY
Start: 2015-11-11 | End: 2024-01-05

## 2024-01-04 RX ORDER — LEVOTHYROXINE SODIUM 125 UG/1
TABLET ORAL
COMMUNITY
Start: 2019-10-26 | End: 2024-01-29 | Stop reason: SDUPTHER

## 2024-01-04 RX ORDER — LISINOPRIL 20 MG/1
TABLET ORAL
COMMUNITY
Start: 2019-06-01 | End: 2024-01-29 | Stop reason: SDUPTHER

## 2024-01-04 RX ORDER — OXYBUTYNIN CHLORIDE 5 MG/1
5 TABLET ORAL
COMMUNITY
Start: 2015-12-03 | End: 2024-01-29 | Stop reason: SDUPTHER

## 2024-01-04 RX ORDER — POTASSIUM CHLORIDE 20 MEQ/1
TABLET, EXTENDED RELEASE ORAL
COMMUNITY
End: 2024-01-29 | Stop reason: SDUPTHER

## 2024-01-04 RX ORDER — NAPROXEN SODIUM 220 MG/1
1 TABLET, FILM COATED ORAL DAILY
COMMUNITY
Start: 2019-05-28 | End: 2024-01-19 | Stop reason: WASHOUT

## 2024-01-04 RX ORDER — ISOSORBIDE MONONITRATE 30 MG/1
1 TABLET, EXTENDED RELEASE ORAL DAILY
COMMUNITY
End: 2024-05-29 | Stop reason: WASHOUT

## 2024-01-04 RX ORDER — ALBUTEROL SULFATE 90 UG/1
AEROSOL, METERED RESPIRATORY (INHALATION)
COMMUNITY
Start: 2017-01-31 | End: 2024-05-29 | Stop reason: WASHOUT

## 2024-01-04 RX ORDER — METFORMIN HYDROCHLORIDE 500 MG/1
TABLET ORAL EVERY 12 HOURS
COMMUNITY
Start: 2016-06-16 | End: 2024-01-29 | Stop reason: SDUPTHER

## 2024-01-04 RX ORDER — CHOLECALCIFEROL (VITAMIN D3) 25 MCG
TABLET ORAL EVERY 24 HOURS
COMMUNITY

## 2024-01-04 RX ORDER — CARVEDILOL 25 MG/1
25 TABLET ORAL
COMMUNITY
Start: 2015-11-09 | End: 2024-04-29

## 2024-01-05 DIAGNOSIS — K21.9 GASTROESOPHAGEAL REFLUX DISEASE WITHOUT ESOPHAGITIS: Primary | ICD-10-CM

## 2024-01-05 RX ORDER — OMEPRAZOLE 40 MG/1
40 CAPSULE, DELAYED RELEASE ORAL
Qty: 90 CAPSULE | Refills: 1 | Status: SHIPPED | OUTPATIENT
Start: 2024-01-05 | End: 2024-01-29 | Stop reason: SDUPTHER

## 2024-01-10 ENCOUNTER — LAB (OUTPATIENT)
Dept: LAB | Facility: LAB | Age: 59
End: 2024-01-10
Payer: COMMERCIAL

## 2024-01-10 ENCOUNTER — APPOINTMENT (OUTPATIENT)
Dept: LAB | Facility: LAB | Age: 59
End: 2024-01-10
Payer: COMMERCIAL

## 2024-01-10 DIAGNOSIS — N18.2 CHRONIC KIDNEY DISEASE, STAGE 2 (MILD): ICD-10-CM

## 2024-01-10 DIAGNOSIS — E55.9 VITAMIN D DEFICIENCY, UNSPECIFIED: ICD-10-CM

## 2024-01-10 DIAGNOSIS — E11.21 TYPE 2 DIABETES MELLITUS WITH DIABETIC NEPHROPATHY (MULTI): ICD-10-CM

## 2024-01-10 LAB
CREAT UR-MCNC: 188.7 MG/DL (ref 20–320)
MICROALBUMIN UR-MCNC: 21.6 MG/L
MICROALBUMIN/CREAT UR: 11.4 UG/MG CREAT

## 2024-01-10 PROCEDURE — 82570 ASSAY OF URINE CREATININE: CPT

## 2024-01-10 PROCEDURE — 82043 UR ALBUMIN QUANTITATIVE: CPT

## 2024-01-16 ENCOUNTER — APPOINTMENT (OUTPATIENT)
Dept: ORTHOPEDIC SURGERY | Facility: CLINIC | Age: 59
End: 2024-01-16
Payer: COMMERCIAL

## 2024-01-17 ASSESSMENT — ENCOUNTER SYMPTOMS
FATIGUE: 1
TREMORS: 0
HUNGER: 0
VISUAL CHANGE: 0
WEIGHT LOSS: 0
BLURRED VISION: 0
DIZZINESS: 0
POLYDIPSIA: 0
BLACKOUTS: 0
SWEATS: 0
HEADACHES: 0
WEAKNESS: 0
SPEECH DIFFICULTY: 0
SEIZURES: 0
CONFUSION: 0
NERVOUS/ANXIOUS: 0
POLYPHAGIA: 0

## 2024-01-19 ENCOUNTER — TELEMEDICINE (OUTPATIENT)
Dept: PRIMARY CARE | Facility: CLINIC | Age: 59
End: 2024-01-19
Payer: COMMERCIAL

## 2024-01-19 DIAGNOSIS — Z99.81 CHRONIC RESPIRATORY FAILURE WITH HYPOXIA, ON HOME OXYGEN THERAPY (MULTI): Primary | ICD-10-CM

## 2024-01-19 DIAGNOSIS — J20.9 ACUTE BRONCHITIS, UNSPECIFIED ORGANISM: ICD-10-CM

## 2024-01-19 DIAGNOSIS — J96.11 CHRONIC RESPIRATORY FAILURE WITH HYPOXIA, ON HOME OXYGEN THERAPY (MULTI): Primary | ICD-10-CM

## 2024-01-19 DIAGNOSIS — J44.1 CHRONIC OBSTRUCTIVE PULMONARY DISEASE WITH ACUTE EXACERBATION (MULTI): Chronic | ICD-10-CM

## 2024-01-19 PROCEDURE — 99442 PR PHYS/QHP TELEPHONE EVALUATION 11-20 MIN: CPT | Performed by: FAMILY MEDICINE

## 2024-01-19 RX ORDER — CALCIUM CITRATE/VITAMIN D3 200MG-6.25
1 TABLET ORAL 2 TIMES DAILY
COMMUNITY
Start: 2023-10-25

## 2024-01-19 RX ORDER — DOXYCYCLINE 100 MG/1
100 CAPSULE ORAL 2 TIMES DAILY
Qty: 20 CAPSULE | Refills: 0 | Status: SHIPPED | OUTPATIENT
Start: 2024-01-19 | End: 2024-01-29

## 2024-01-19 RX ORDER — PREDNISONE 20 MG/1
40 TABLET ORAL DAILY
Qty: 10 TABLET | Refills: 0 | Status: SHIPPED | OUTPATIENT
Start: 2024-01-19 | End: 2024-01-24

## 2024-01-19 ASSESSMENT — ENCOUNTER SYMPTOMS
OCCASIONAL FEELINGS OF UNSTEADINESS: 0
DEPRESSION: 0
LOSS OF SENSATION IN FEET: 0

## 2024-01-19 ASSESSMENT — PATIENT HEALTH QUESTIONNAIRE - PHQ9
1. LITTLE INTEREST OR PLEASURE IN DOING THINGS: NOT AT ALL
2. FEELING DOWN, DEPRESSED OR HOPELESS: NOT AT ALL
SUM OF ALL RESPONSES TO PHQ9 QUESTIONS 1 AND 2: 0

## 2024-01-19 NOTE — PROGRESS NOTES
Subjective   Chief complaint: Joselin Dunn is a 58 y.o. female who presents for URI (Patient c/o runny nose, congestion, and cough that started on Monday. Stated that she was supposed to have an appointment with Ortho, but due to symptoms they require she have COVID testing prior. Stated that she has a appointment for COVID test at [facility] today at 5PM since she has not test kits at home. ).    HPI:  HPI  Virtual.  Phone call.  Cough congestion.  5 days.  Getting worse.  Chest tight.  Wheeze.  Short of breath.  History of COPD.  She had to have an appointment with orthopedics rescheduled.  She is cannot test COVID tonight.  Otherwise she sounds congested.  But she is conversant.  Empiric antibiotic.  Steroid.  Bronchodilator.  Rest fluids Tylenol.  Treat COVID pending test results.  Follow-up as scheduled or go to emergency department if it worsens in the interim.  Virtual or Telephone Consent    A telephone visit (audio only) between the patient (at the originating site) and the provider (at the distant site) was utilized to provide this telehealth service.   Verbal consent was requested and obtained from Joselin Dunn on this date, 01/19/24 for a telehealth visit.    Objective   There were no vitals taken for this visit.  Physical Exam  Review of Systems   I have reviewed and reconciled the medication list with the patient today.   Current Outpatient Medications:     blood sugar diagnostic (True Metrix Glucose Test Strip) strip, 1 strip twice a day., Disp: , Rfl:     buPROPion XL (Wellbutrin XL) 300 mg 24 hr tablet, Take 1 tablet (300 mg) by mouth once daily in the morning. Take before meals., Disp: , Rfl:     carvedilol (Coreg) 25 mg tablet, Take 1 tablet (25 mg) by mouth 2 times a day with meals. One and a half tablets twice daily, Disp: , Rfl:     cholecalciferol (Vitamin D-3) 25 MCG (1000 UT) tablet, once every 24 hours., Disp: , Rfl:     dilTIAZem ER (Tiazac) 120 mg 24 hr capsule, Take by  mouth., Disp: , Rfl:     isosorbide mononitrate ER (Imdur) 30 mg 24 hr tablet, Take 1 tablet (30 mg) by mouth once daily., Disp: , Rfl:     levothyroxine (Synthroid, Levoxyl) 125 mcg tablet, 1 (one) time each day at the same time., Disp: , Rfl:     lisinopril 20 mg tablet, 1 (one) time each day at the same time., Disp: , Rfl:     metFORMIN (Glucophage) 500 mg tablet, every 12 hours., Disp: , Rfl:     omeprazole (PriLOSEC) 40 mg DR capsule, Take 1 capsule by mouth every morning., Disp: 90 capsule, Rfl: 1    oxybutynin (Ditropan) 5 mg tablet, Take 1 tablet (5 mg) by mouth., Disp: , Rfl:     potassium chloride CR 20 mEq ER tablet, 1 (one) time each day at the same time., Disp: , Rfl:     ProAir HFA 90 mcg/actuation inhaler, Inhale., Disp: , Rfl:     rosuvastatin (Crestor) 20 mg tablet, 1 (one) time each day at the same time., Disp: , Rfl:     Trulicity 1.5 mg/0.5 mL pen injector injection, Inject 1.5 mg under the skin once weekly., Disp: 2 mL, Rfl: 0    aspirin 81 mg chewable tablet, Chew 1 tablet (81 mg) once daily., Disp: , Rfl:      Imaging:  No results found.     Labs reviewed:    Lab Results   Component Value Date    WBC 7.5 01/03/2024    HGB 12.6 01/03/2024    HCT 38.4 01/03/2024     01/03/2024    CHOL 101 07/11/2023    TRIG 168 (H) 07/11/2023    HDL 39.2 (A) 07/11/2023    ALT 31 07/11/2023    AST 20 07/11/2023     01/03/2024    K 3.8 01/03/2024     (H) 01/03/2024    CREATININE 0.94 01/03/2024    BUN 12 01/03/2024    CO2 25 01/03/2024    TSH 0.69 01/03/2024    HGBA1C 5.7 (H) 01/03/2024       Assessment/Plan   Problem List Items Addressed This Visit             ICD-10-CM    Chronic obstructive pulmonary disease (CMS/[facility]) (Chronic) J44.9    Chronic respiratory failure with hypoxia, on home oxygen therapy (CMS/[facility]) - Primary J96.11, Z99.81     Other Visit Diagnoses         Codes    Acute bronchitis, unspecified organism     J20.9        15 min    Reinforced lifestyle modifications  Continue  current medications as listed  Physical activity as tolerated and healthy diet encouraged  Maintain a healthy weight  Follow up in

## 2024-01-24 ENCOUNTER — APPOINTMENT (OUTPATIENT)
Dept: PRIMARY CARE | Facility: CLINIC | Age: 59
End: 2024-01-24
Payer: COMMERCIAL

## 2024-01-25 ENCOUNTER — OFFICE VISIT (OUTPATIENT)
Dept: ORTHOPEDIC SURGERY | Facility: CLINIC | Age: 59
End: 2024-01-25
Payer: COMMERCIAL

## 2024-01-25 DIAGNOSIS — M19.049 CMC ARTHRITIS: Primary | ICD-10-CM

## 2024-01-25 PROCEDURE — 99213 OFFICE O/P EST LOW 20 MIN: CPT | Performed by: ORTHOPAEDIC SURGERY

## 2024-01-25 PROCEDURE — 1036F TOBACCO NON-USER: CPT | Performed by: ORTHOPAEDIC SURGERY

## 2024-01-25 NOTE — PROGRESS NOTES
1/25/2024    Chief Complaint   Patient presents with    Left Hand - Follow-up     Lt thumb cmc s/p inj 7/25/23       History of Present Illness:  Patient Joselin Dunn , 58 y.o. female, presents today, 1/25/2024, for evaluation of bilateral thumb pain.  She states that when she called for appointment the thumbs were exquisitely painful, but now things have actually calm down in the interim.  She is wearing her braces at home.  Denies any new injury or trauma.  Both thumbs were painful recently.         Review of Systems:   GENERAL: Negative  GI: Negative  MUSCULOSKELETAL: See HPI  SKIN: Negative  NEURO:  Negative     Physical Exam:  GENERAL:  Alert and oriented to person, place, and time.  No acute distress and breathing comfortably; pleasant and cooperative with the examination.  HEENT:  Head is normocephalic and atraumatic.  NECK:  Supple, no visible swelling.  CARDIOVASCULAR:  No palpable tachycardia.  LUNGS:  No audible wheezing or labored breathing.  ABDOMEN:  Nondistended.  Extremities: Evaluation of bilateral upper extremities finds the patient to have a palpable radial artery at the wrist with brisk capillary refill to all digits. The patient has intact sensorium to axillary, radial, median and ulnar nerves. There are no open wounds. There are no signs of infection. There is no evidence of lymphedema or lymphatic streaking. The patient has supple compartments of the bilateral arms, forearms and hands.  No tenderness palpation of the bilateral thumb CMC articulations today, negative basilar grind test.     Imaging:  None today.        Assessment:  Bilateral thumb CMC osteoarthritis.      Plan:  Option of Kenalog injection was presented.  Patient declines today in favor of simple observation and continued intermittent bracing.  We discussed case she can continue with activities as tolerated in the interim.  Follow-up with our office in as-needed basis symptoms dictate if she calls for an appointment will  work in immediately for injection.  All questions answered today's visit.    In a face to face encounter, I performed a history and physical examination, discussed pertinent diagnostic studies if indicated, and discussed diagnosis and management strategies with both the patient and the mid-level provider. I reviewed the mid-level's note and agree with the documented findings and plan of care.    Patient presents today for evaluation of mildly send bilateral thumb CMC arthritis.  A few weeks back she really had a flareup index when she made the appointment.  Now things to calm down.  Exam shows minimal tenderness to bilateral thumb CMC joints.  We talked about treatment options.  Recommendations were made for simple observation and follow-up if and when pain returns.  If she does experience a flareup we will do our best to work her in right away.

## 2024-01-27 ASSESSMENT — ENCOUNTER SYMPTOMS
POLYPHAGIA: 0
WEIGHT LOSS: 0
CONFUSION: 0
FATIGUE: 1
HUNGER: 0
BLURRED VISION: 0
SEIZURES: 0
DIZZINESS: 0
WEAKNESS: 0
POLYDIPSIA: 0
BLACKOUTS: 0
NERVOUS/ANXIOUS: 0
SWEATS: 0
HEADACHES: 0
TREMORS: 0
SPEECH DIFFICULTY: 0
VISUAL CHANGE: 0

## 2024-01-27 NOTE — PROGRESS NOTES
Subjective   Chief complaint: Joselin Dunn is a 58 y.o. female who presents for Establish Care (Pt is a previous Dr Mcmahon patient.) and URI (Patient advised that she has been having URI sx's  for the past week. ).    HPI:   Here to discuss medication refills.  She is transferring care to me.  She needs a refill on her Trulicity and would like a refill on her other medications as well.  She is frustrated about her weight, however, she admits that she does not follow a diet and she is not exercising.  She has 1 son who is very supportive.    Patient sees Dr. Man for her depression.  Gets her depression medications from her psychiatrist.  Sees Dr. Santos for nephrology.  Her kidney function is normal.  Sees cardiology and was told her next follow up with them could be in one year.    No new concerns today.      Diabetes  She has type 2 diabetes mellitus. No MedicAlert identification noted. Pertinent negatives for hypoglycemia include no confusion, dizziness, headaches, hunger, mood changes, nervousness/anxiousness, pallor, seizures, sleepiness, speech difficulty, sweats or tremors. Associated symptoms include fatigue. Pertinent negatives for diabetes include no blurred vision, no foot paresthesias, no foot ulcerations, no polydipsia, no polyphagia, no polyuria, no visual change, no weakness and no weight loss. Pertinent negatives for hypoglycemia complications include no blackouts, no hospitalization, no nocturnal hypoglycemia, no required assistance and no required glucagon injection. Pertinent negatives for diabetic complications include no CVA, heart disease, impotence, nephropathy, peripheral neuropathy, PVD or retinopathy. Risk factors for coronary artery disease include dyslipidemia, hypertension and obesity. Current diabetic treatment includes insulin injections. She is compliant with treatment most of the time. She is currently taking insulin pre-lunch. Insulin injections are given by patient. Rotation  "sites for injection include the abdominal wall. She is following a generally unhealthy diet. When asked about meal planning, she reported none. She has not had a previous visit with a dietitian. She never participates in exercise. She monitors blood glucose at home 1-2 x per day. Blood glucose monitoring compliance is good. Her lunch blood glucose is taken between 12-1 pm. Her dinner blood glucose is taken between 6-7 pm. She sees a podiatrist.Eye exam is current.       Objective   /80 (BP Location: Left arm, Patient Position: Sitting, BP Cuff Size: Large adult)   Pulse 76   Temp 36.3 °C (97.4 °F) (Temporal)   Ht 1.575 m (5' 2\")   Wt 113 kg (250 lb)   BMI 45.73 kg/m²     Physical Exam  General:  Alert, oriented, no acute distress  Eyes:  Sclerae appear clear.  ENT:  No nasal congestion.    Neck: Supple  Endocrine: Last hemoglobin A1c was 5.7% on January 3, 2024.  Respiratory:  No dyspnea.  Psychiatric:  Affect is positive and appropriate.  No depression.  No anxiety.    Review of Systems   Constitutional:  Positive for fatigue. Negative for weight loss.   Eyes:  Negative for blurred vision.   Endocrine: Negative for polydipsia, polyphagia and polyuria.   Genitourinary:  Negative for impotence.   Skin:  Negative for pallor.   Neurological:  Negative for dizziness, tremors, seizures, speech difficulty, weakness and headaches.   Psychiatric/Behavioral:  Negative for confusion. The patient is not nervous/anxious.       I have reviewed and reconciled the medication list with the patient today.   Current Outpatient Medications:     blood sugar diagnostic (True Metrix Glucose Test Strip) strip, 1 strip twice a day., Disp: , Rfl:     buPROPion XL (Wellbutrin XL) 300 mg 24 hr tablet, Take 1 tablet (300 mg) by mouth once daily in the morning. Take before meals., Disp: , Rfl:     carvedilol (Coreg) 25 mg tablet, Take 1 tablet (25 mg) by mouth 2 times a day with meals., Disp: , Rfl:     cholecalciferol (Vitamin D-3) " 25 MCG (1000 UT) tablet, once every 24 hours., Disp: , Rfl:     dilTIAZem ER (Tiazac) 120 mg 24 hr capsule, Take by mouth., Disp: , Rfl:     doxycycline (Vibramycin) 100 mg capsule, Take 1 capsule (100 mg) by mouth 2 times a day for 10 days. Take with at least 8 ounces (large glass) of water, do not lie down for 30 minutes after, Disp: 20 capsule, Rfl: 0    isosorbide mononitrate ER (Imdur) 30 mg 24 hr tablet, Take 1 tablet (30 mg) by mouth once daily., Disp: , Rfl:     ProAir HFA 90 mcg/actuation inhaler, Inhale., Disp: , Rfl:     levothyroxine (Synthroid, Levoxyl) 125 mcg tablet, 1 (one) time each day at the same time., Disp: 90 tablet, Rfl: 3    lisinopril 20 mg tablet, 1 (one) time each day at the same time., Disp: 90 tablet, Rfl: 3    metFORMIN (Glucophage) 500 mg tablet, Take 1 tablet (500 mg) by mouth every 12 hours., Disp: 90 tablet, Rfl: 3    omeprazole (PriLOSEC) 40 mg DR capsule, Take 1 capsule (40 mg) by mouth once daily in the morning. Take before meals., Disp: 90 capsule, Rfl: 1    oxybutynin (Ditropan) 5 mg tablet, Take 1 tablet (5 mg) by mouth 2 times a day., Disp: 90 tablet, Rfl: 1    potassium chloride CR 20 mEq ER tablet, 1 (one) time each day at the same time., Disp: 90 tablet, Rfl: 1    rosuvastatin (Crestor) 20 mg tablet, 1 (one) time each day at the same time., Disp: 90 tablet, Rfl: 3    Trulicity 1.5 mg/0.5 mL pen injector injection, Inject 1.5 mg under the skin once weekly., Disp: 2 mL, Rfl: 3     Imaging:  No results found.     Labs reviewed:    Lab Results   Component Value Date    WBC 7.5 01/03/2024    HGB 12.6 01/03/2024    HCT 38.4 01/03/2024     01/03/2024    CHOL 101 07/11/2023    TRIG 168 (H) 07/11/2023    HDL 39.2 (A) 07/11/2023    ALT 31 07/11/2023    AST 20 07/11/2023     01/03/2024    K 3.8 01/03/2024     (H) 01/03/2024    CREATININE 0.94 01/03/2024    BUN 12 01/03/2024    CO2 25 01/03/2024    TSH 0.69 01/03/2024    HGBA1C 5.7 (H) 01/03/2024        Assessment/Plan   Problem List Items Addressed This Visit       Hyperlipidemia     Monitoring - order on next blood test.         Relevant Medications    rosuvastatin (Crestor) 20 mg tablet    Other Relevant Orders    Comprehensive Metabolic Panel    Lipid Panel    Morbid obesity (CMS/[facility])     Diet and exercise recommendations discussed.             Primary hypertension     Controlled.  Continue present management.         Relevant Medications    potassium chloride CR 20 mEq ER tablet    lisinopril 20 mg tablet    Other Relevant Orders    CBC and Auto Differential    TSH with reflex to Free T4 if abnormal    Type 2 diabetes mellitus without complication (CMS/[facility]) - Primary     A1c reveals good glucose control.  Medication refill given.         Relevant Medications    metFORMIN (Glucophage) 500 mg tablet    Trulicity 1.5 mg/0.5 mL pen injector injection    Other Relevant Orders    Comprehensive Metabolic Panel    Albumin , Urine Random    Hemoglobin A1C     Other Visit Diagnoses       Gastroesophageal reflux disease without esophagitis        Relevant Medications    omeprazole (PriLOSEC) 40 mg DR capsule    BMI 45.0-49.9, adult (CMS/[facility])        Relevant Orders    Vitamin D 25-Hydroxy,Total (for eval of Vitamin D levels)    Acquired hypothyroidism        Relevant Medications    levothyroxine (Synthroid, Levoxyl) 125 mcg tablet    Other Relevant Orders    TSH with reflex to Free T4 if abnormal    Type 2 diabetes mellitus with hyperglycemia, without long-term current use of insulin (CMS/[facility])        Mixed stress and urge urinary incontinence        Relevant Medications    oxybutynin (Ditropan) 5 mg tablet    Healthcare maintenance        Relevant Orders    HIV 1/2 Antigen/Antibody Screen with Reflex to Confirmation            Continue current medications as listed  Follow up in May after the next blood test.

## 2024-01-29 ENCOUNTER — OFFICE VISIT (OUTPATIENT)
Dept: PRIMARY CARE | Facility: CLINIC | Age: 59
End: 2024-01-29
Payer: COMMERCIAL

## 2024-01-29 VITALS
WEIGHT: 250 LBS | TEMPERATURE: 97.4 F | HEART RATE: 76 BPM | SYSTOLIC BLOOD PRESSURE: 130 MMHG | BODY MASS INDEX: 46.01 KG/M2 | HEIGHT: 62 IN | DIASTOLIC BLOOD PRESSURE: 80 MMHG

## 2024-01-29 DIAGNOSIS — I10 PRIMARY HYPERTENSION: ICD-10-CM

## 2024-01-29 DIAGNOSIS — K21.9 GASTROESOPHAGEAL REFLUX DISEASE WITHOUT ESOPHAGITIS: ICD-10-CM

## 2024-01-29 DIAGNOSIS — E11.9 TYPE 2 DIABETES MELLITUS WITHOUT COMPLICATION, UNSPECIFIED WHETHER LONG TERM INSULIN USE (MULTI): Primary | ICD-10-CM

## 2024-01-29 DIAGNOSIS — Z00.00 HEALTHCARE MAINTENANCE: ICD-10-CM

## 2024-01-29 DIAGNOSIS — N39.46 MIXED STRESS AND URGE URINARY INCONTINENCE: ICD-10-CM

## 2024-01-29 DIAGNOSIS — E03.9 ACQUIRED HYPOTHYROIDISM: ICD-10-CM

## 2024-01-29 DIAGNOSIS — E11.65 TYPE 2 DIABETES MELLITUS WITH HYPERGLYCEMIA, WITHOUT LONG-TERM CURRENT USE OF INSULIN (MULTI): ICD-10-CM

## 2024-01-29 DIAGNOSIS — E78.2 MIXED HYPERLIPIDEMIA: ICD-10-CM

## 2024-01-29 DIAGNOSIS — E66.01 MORBID OBESITY (MULTI): ICD-10-CM

## 2024-01-29 PROCEDURE — 1036F TOBACCO NON-USER: CPT | Performed by: FAMILY MEDICINE

## 2024-01-29 PROCEDURE — 3044F HG A1C LEVEL LT 7.0%: CPT | Performed by: FAMILY MEDICINE

## 2024-01-29 PROCEDURE — 99214 OFFICE O/P EST MOD 30 MIN: CPT | Performed by: FAMILY MEDICINE

## 2024-01-29 PROCEDURE — 3066F NEPHROPATHY DOC TX: CPT | Performed by: FAMILY MEDICINE

## 2024-01-29 PROCEDURE — 3075F SYST BP GE 130 - 139MM HG: CPT | Performed by: FAMILY MEDICINE

## 2024-01-29 PROCEDURE — 3079F DIAST BP 80-89 MM HG: CPT | Performed by: FAMILY MEDICINE

## 2024-01-29 PROCEDURE — 3061F NEG MICROALBUMINURIA REV: CPT | Performed by: FAMILY MEDICINE

## 2024-01-29 PROCEDURE — 4010F ACE/ARB THERAPY RXD/TAKEN: CPT | Performed by: FAMILY MEDICINE

## 2024-01-29 PROCEDURE — 3008F BODY MASS INDEX DOCD: CPT | Performed by: FAMILY MEDICINE

## 2024-01-29 RX ORDER — LEVOTHYROXINE SODIUM 125 UG/1
TABLET ORAL
Qty: 90 TABLET | Refills: 3 | Status: SHIPPED | OUTPATIENT
Start: 2024-01-29 | End: 2024-05-29 | Stop reason: DRUGHIGH

## 2024-01-29 RX ORDER — DULAGLUTIDE 1.5 MG/.5ML
INJECTION, SOLUTION SUBCUTANEOUS
Qty: 2 ML | Refills: 3 | Status: SHIPPED | OUTPATIENT
Start: 2024-01-29 | End: 2024-04-11 | Stop reason: RX

## 2024-01-29 RX ORDER — OMEPRAZOLE 40 MG/1
40 CAPSULE, DELAYED RELEASE ORAL
Qty: 90 CAPSULE | Refills: 1 | Status: SHIPPED | OUTPATIENT
Start: 2024-01-29 | End: 2024-05-29 | Stop reason: SDUPTHER

## 2024-01-29 RX ORDER — LISINOPRIL 20 MG/1
TABLET ORAL
Qty: 90 TABLET | Refills: 3 | Status: SHIPPED | OUTPATIENT
Start: 2024-01-29

## 2024-01-29 RX ORDER — METFORMIN HYDROCHLORIDE 500 MG/1
500 TABLET ORAL EVERY 12 HOURS
Qty: 90 TABLET | Refills: 3 | Status: SHIPPED | OUTPATIENT
Start: 2024-01-29 | End: 2024-02-09

## 2024-01-29 RX ORDER — OXYBUTYNIN CHLORIDE 5 MG/1
5 TABLET ORAL 2 TIMES DAILY
Qty: 90 TABLET | Refills: 1 | Status: SHIPPED | OUTPATIENT
Start: 2024-01-29 | End: 2024-04-29

## 2024-01-29 RX ORDER — POTASSIUM CHLORIDE 20 MEQ/1
TABLET, EXTENDED RELEASE ORAL
Qty: 90 TABLET | Refills: 1 | Status: SHIPPED | OUTPATIENT
Start: 2024-01-29 | End: 2024-05-29 | Stop reason: SDUPTHER

## 2024-01-29 RX ORDER — ROSUVASTATIN CALCIUM 20 MG/1
TABLET, COATED ORAL
Qty: 90 TABLET | Refills: 3 | Status: SHIPPED | OUTPATIENT
Start: 2024-01-29

## 2024-02-03 DIAGNOSIS — N39.46 MIXED STRESS AND URGE URINARY INCONTINENCE: ICD-10-CM

## 2024-02-05 RX ORDER — OXYBUTYNIN CHLORIDE 5 MG/1
5 TABLET ORAL DAILY
Qty: 90 TABLET | Refills: 1 | OUTPATIENT
Start: 2024-02-05

## 2024-02-07 DIAGNOSIS — E11.9 TYPE 2 DIABETES MELLITUS WITHOUT COMPLICATION, UNSPECIFIED WHETHER LONG TERM INSULIN USE (MULTI): ICD-10-CM

## 2024-02-09 RX ORDER — METFORMIN HYDROCHLORIDE 500 MG/1
500 TABLET ORAL EVERY 12 HOURS
Qty: 90 TABLET | Refills: 3 | Status: SHIPPED | OUTPATIENT
Start: 2024-02-09

## 2024-02-14 ENCOUNTER — OFFICE VISIT (OUTPATIENT)
Dept: SURGERY | Facility: CLINIC | Age: 59
End: 2024-02-14
Payer: COMMERCIAL

## 2024-02-14 VITALS
HEIGHT: 62 IN | WEIGHT: 255 LBS | BODY MASS INDEX: 46.93 KG/M2 | DIASTOLIC BLOOD PRESSURE: 68 MMHG | SYSTOLIC BLOOD PRESSURE: 117 MMHG | HEART RATE: 74 BPM

## 2024-02-14 DIAGNOSIS — K58.0 IRRITABLE BOWEL SYNDROME WITH DIARRHEA: Primary | ICD-10-CM

## 2024-02-14 DIAGNOSIS — Z86.010 HISTORY OF COLON POLYPS: ICD-10-CM

## 2024-02-14 PROCEDURE — 3008F BODY MASS INDEX DOCD: CPT | Performed by: SURGERY

## 2024-02-14 PROCEDURE — 3078F DIAST BP <80 MM HG: CPT | Performed by: SURGERY

## 2024-02-14 PROCEDURE — 1036F TOBACCO NON-USER: CPT | Performed by: SURGERY

## 2024-02-14 PROCEDURE — 3044F HG A1C LEVEL LT 7.0%: CPT | Performed by: SURGERY

## 2024-02-14 PROCEDURE — 3061F NEG MICROALBUMINURIA REV: CPT | Performed by: SURGERY

## 2024-02-14 PROCEDURE — 99213 OFFICE O/P EST LOW 20 MIN: CPT | Performed by: SURGERY

## 2024-02-14 PROCEDURE — 3074F SYST BP LT 130 MM HG: CPT | Performed by: SURGERY

## 2024-02-14 PROCEDURE — 4010F ACE/ARB THERAPY RXD/TAKEN: CPT | Performed by: SURGERY

## 2024-02-14 ASSESSMENT — ENCOUNTER SYMPTOMS
EYES NEGATIVE: 1
GASTROINTESTINAL NEGATIVE: 1
ALLERGIC/IMMUNOLOGIC NEGATIVE: 1
HEMATOLOGIC/LYMPHATIC NEGATIVE: 1
RESPIRATORY NEGATIVE: 1
CONSTITUTIONAL NEGATIVE: 1
ENDOCRINE NEGATIVE: 1
CARDIOVASCULAR NEGATIVE: 1
NEUROLOGICAL NEGATIVE: 1
MUSCULOSKELETAL NEGATIVE: 1
PSYCHIATRIC NEGATIVE: 1

## 2024-02-14 NOTE — PROGRESS NOTES
Subjective   Patient ID: Joselin Dunn is a 58 y.o. female who presents for Follow-up (Follow up appointment for medication refill. ).  HPI  Ms. Dunn is a 53-year-old female who presents for follow-up of intermittent abdominal discomfort, irritable bowel syndrome with postprandial cramping and urgency with bowel movements. No identified food triggers. Stools are soft and formed, denies diarrhea component. Previously markedly improved with use of Bentyl, which she is consistently taking. She denies nausea, vomiting, fever or chills. No blood in her stools.    Previously maintained on dicyclomine 3 times every day, reassured regarding indications and use  Increase fiber intake, goal of 30 g/day    Patient currently notes that she has discontinued this medication for some time and has had complete resolution of her symptoms.  She now has formed stools, no identified food intolerances, and no active GI complaints at this time.     Colonoscopy previously in August 2017, mild diverticulosis noted. No polyps. Recommended for repeat between 5 and 10 years.    Repeated 11/2022, findings of sigmoid diverticulosis and diminutive tubular adenoma.     Review of Systems   Constitutional: Negative.    HENT: Negative.     Eyes: Negative.    Respiratory: Negative.     Cardiovascular: Negative.    Gastrointestinal: Negative.    Endocrine: Negative.    Genitourinary: Negative.    Musculoskeletal: Negative.    Skin: Negative.    Allergic/Immunologic: Negative.    Neurological: Negative.    Hematological: Negative.    Psychiatric/Behavioral: Negative.         Past Medical History:   Diagnosis Date    Achilles tendinitis, unspecified leg 04/15/2021    Achilles tendinitis    Arthritis     Diabetes mellitus (CMS/[facility])     Encounter for gynecological examination (general) (routine) without abnormal findings 04/25/2022    Normal gynecologic examination    Encounter for gynecological examination (general) (routine) without abnormal  findings     Encounter for routine gynecological examination    Encounter for other screening for malignant neoplasm of breast 11/15/2021    Breast screening    Encounter for screening for malignant neoplasm of cervix 01/04/2022    Cervical cancer screening    Hyperlipidemia, unspecified     Dyslipidemia    Hypertension     Hypoesthesia of skin     Hypesthesia    Impacted cerumen, right ear 04/15/2022    Impacted cerumen of right ear    Impingement syndrome of right shoulder     Impingement syndrome of right shoulder    Irritable bowel syndrome without diarrhea 10/14/2020    Irritable bowel syndrome without diarrhea    Left lower quadrant pain 11/14/2018    Left lower quadrant pain    Mechanical loosening of other internal prosthetic joint, initial encounter (CMS/[facility]) 02/19/2020    Aseptic loosening of prosthetic knee    Medial epicondylitis, unspecified elbow 02/19/2020    Medial epicondylitis    Menopausal and female climacteric states     Perimenopause    Muscle weakness (generalized) 03/03/2020    Weakness of trunk musculature    Other bursitis of hip, unspecified hip 02/19/2020    Hip bursitis    Other conditions influencing health status     ASCUS with positive high risk HPV    Other specified joint disorders, unspecified shoulder     Shoulder impingement    Other specified joint disorders, unspecified shoulder 02/19/2020    Shoulder impingement    Other specified noninflammatory disorders of vulva and perineum 10/30/2019    Vulvar lesion    Other specified noninflammatory disorders of vulva and perineum 10/30/2019    Vulvar lesion    Other specified soft tissue disorders 07/11/2022    Swelling of thumb, left    Other symptoms and signs involving the musculoskeletal system 03/11/2020    Weakness of right lower extremity    Other tear of medial meniscus, current injury, unspecified knee, initial encounter 02/19/2020    Acute tear medial meniscus    Pain in right ankle and joints of right foot 03/11/2020     Ankle pain, right    Pain in right finger(s) 10/10/2022    Pain of right thumb    Pain in thoracic spine 03/03/2020    Thoracic back pain    Pain in unspecified ankle and joints of unspecified foot 02/19/2020    Ankle joint pain    Pain in unspecified elbow 02/19/2020    Elbow pain    Pain in unspecified foot 03/11/2020    Foot pain    Pain in unspecified knee 02/19/2020    Knee pain    Pain in unspecified shoulder     Shoulder pain    Pain in unspecified shoulder 03/03/2020    Shoulder pain    Papillomavirus as the cause of diseases classified elsewhere     High risk HPV infection    Patellofemoral disorders, unspecified knee 02/19/2020    PFS (patellofemoral syndrome)    Personal history of colonic polyps 08/02/2017    History of colon polyps    Personal history of other diseases of the circulatory system     History of hypertension    Personal history of other diseases of the digestive system 02/25/2016    History of bloody stools    Personal history of other diseases of the digestive system 04/23/2021    History of constipation    Personal history of other diseases of the digestive system 10/29/2021    History of umbilical hernia    Personal history of other diseases of the digestive system     History of gastroesophageal reflux (GERD)    Personal history of other diseases of the digestive system     History of abdominal hernia    Personal history of other diseases of the musculoskeletal system and connective tissue 03/03/2020    History of muscle stiffness    Personal history of other diseases of the musculoskeletal system and connective tissue 02/19/2020    History of ganglion cyst    Personal history of other diseases of the musculoskeletal system and connective tissue 02/19/2020    History of rotator cuff tear    Personal history of other diseases of the musculoskeletal system and connective tissue 02/19/2020    History of radiculopathy    Personal history of other diseases of the musculoskeletal system and  connective tissue 03/03/2020    History of low back pain    Personal history of other diseases of the musculoskeletal system and connective tissue     History of arthritis    Personal history of other diseases of the nervous system and sense organs 11/09/2021    History of sleep apnea    Personal history of other diseases of the nervous system and sense organs     History of eye problem    Personal history of other endocrine, nutritional and metabolic disease 11/10/2021    History of hyperglycemia    Personal history of other endocrine, nutritional and metabolic disease     History of diabetes mellitus    Personal history of other endocrine, nutritional and metabolic disease 11/15/2021    History of diabetes mellitus    Personal history of other endocrine, nutritional and metabolic disease     History of hypothyroidism    Personal history of other medical treatment     History of screening mammography    Personal history of other mental and behavioral disorders     History of depression    Personal history of other specified conditions     History of dyspnea    Personal history of other specified conditions     History of numbness    Personal history of other specified conditions     History of chest pain    Personal history of other specified conditions 08/03/2022    History of palpitations    Presence of right artificial knee joint 02/19/2020    Status post right knee replacement    Radiculopathy, cervical region 02/19/2020    Cervical radiculopathy, acute    Strain of muscle, fascia and tendon of left hip, initial encounter 02/19/2020    Strain of left hip    Unspecified abdominal pain 10/27/2021    Abdominal pain of multiple sites    Unspecified mononeuropathy of unspecified upper limb 02/19/2020    Neuropathy of upper extremity    Unspecified nonsuppurative otitis media, left ear 04/15/2022    Left serous otitis media, unspecified chronicity     Past Surgical History:   Procedure Laterality Date    ANKLE  SURGERY  10/29/2021    Ankle Surgery    CHOLECYSTECTOMY  2016    Cholecystectomy    OTHER SURGICAL HISTORY  10/29/2021    Knee surgery    OTHER SURGICAL HISTORY  10/29/2021    Cataract surgery    OTHER SURGICAL HISTORY  10/29/2021    Shoulder surgery    OTHER SURGICAL HISTORY  2020    Gallbladder surgery    OTHER SURGICAL HISTORY  11/10/2022    Colonoscopy    OTHER SURGICAL HISTORY  06/10/2019    Hernia repair    OTHER SURGICAL HISTORY  06/10/2019    Tubal ligation    TOTAL KNEE ARTHROPLASTY  2016    Knee Replacement     Family History   Problem Relation Name Age of Onset    Diabetes Mother Michelle Estes       Social History     Socioeconomic History    Marital status:      Spouse name: None    Number of children: None    Years of education: None    Highest education level: None   Occupational History    None   Tobacco Use    Smoking status: Former     Types: Cigarettes     Quit date: 2020     Years since quittin.0    Smokeless tobacco: Former    Tobacco comments:     Very bad habit   Vaping Use    Vaping Use: Never used   Substance and Sexual Activity    Alcohol use: Not Currently     Comment: Rarely drink alcohol    Drug use: Not Currently     Types: Marijuana    Sexual activity: Not Currently     Partners: Male     Birth control/protection: Abstinence   Other Topics Concern    None   Social History Narrative    None     Social Determinants of Health     Financial Resource Strain: Not on file   Food Insecurity: Not on file   Transportation Needs: Not on file   Physical Activity: Not on file   Stress: Not on file   Social Connections: Not on file   Intimate Partner Violence: Not on file   Housing Stability: Not on file          Current Outpatient Medications:     blood sugar diagnostic (True Metrix Glucose Test Strip) strip, 1 strip twice a day., Disp: , Rfl:     buPROPion XL (Wellbutrin XL) 300 mg 24 hr tablet, Take 1 tablet (300 mg) by mouth once daily in the morning. Take before  meals., Disp: , Rfl:     carvedilol (Coreg) 25 mg tablet, Take 1 tablet (25 mg) by mouth 2 times a day with meals., Disp: , Rfl:     cholecalciferol (Vitamin D-3) 25 MCG (1000 UT) tablet, once every 24 hours., Disp: , Rfl:     dilTIAZem ER (Tiazac) 120 mg 24 hr capsule, Take by mouth., Disp: , Rfl:     isosorbide mononitrate ER (Imdur) 30 mg 24 hr tablet, Take 1 tablet (30 mg) by mouth once daily., Disp: , Rfl:     levothyroxine (Synthroid, Levoxyl) 125 mcg tablet, 1 (one) time each day at the same time., Disp: 90 tablet, Rfl: 3    lisinopril 20 mg tablet, 1 (one) time each day at the same time., Disp: 90 tablet, Rfl: 3    metFORMIN (Glucophage) 500 mg tablet, Take 1 tablet by mouth every 12 hours., Disp: 90 tablet, Rfl: 3    omeprazole (PriLOSEC) 40 mg DR capsule, Take 1 capsule (40 mg) by mouth once daily in the morning. Take before meals., Disp: 90 capsule, Rfl: 1    oxybutynin (Ditropan) 5 mg tablet, Take 1 tablet (5 mg) by mouth 2 times a day., Disp: 90 tablet, Rfl: 1    potassium chloride CR 20 mEq ER tablet, 1 (one) time each day at the same time., Disp: 90 tablet, Rfl: 1    ProAir HFA 90 mcg/actuation inhaler, Inhale., Disp: , Rfl:     rosuvastatin (Crestor) 20 mg tablet, 1 (one) time each day at the same time., Disp: 90 tablet, Rfl: 3    Trulicity 1.5 mg/0.5 mL pen injector injection, Inject 1.5 mg under the skin once weekly., Disp: 2 mL, Rfl: 3     Objective   There were no vitals filed for this visit.   Physical Exam  Constitutional: Alert, no acute distress  HEENT: Well-developed, anicteric  Neck: Supple  Chest: Unlabored respirations  Heart: Regular  Abdomen: Soft, nondistended, nontender no masses [] appreciated  Extremities: Warm and well perfused, no edema  Psychiatric: Oriented appropriately, mood and affect appropriate  Assessment/Plan   Problem List Items Addressed This Visit    None  Visit Diagnoses         Codes    Irritable bowel syndrome with diarrhea    -  Primary K58.0    History of colon  polyps     Z86.010        No need to continue Bentyl as needed, given that patient has had resolution of her IBS symptoms and has been off this medication for some time..  Continue high-fiber diet.    Colonoscopy 2027      Dasha Quan MD

## 2024-03-20 ENCOUNTER — APPOINTMENT (OUTPATIENT)
Dept: CARDIOLOGY | Facility: CLINIC | Age: 59
End: 2024-03-20
Payer: COMMERCIAL

## 2024-03-29 ENCOUNTER — TELEPHONE (OUTPATIENT)
Dept: PRIMARY CARE | Facility: CLINIC | Age: 59
End: 2024-03-29
Payer: COMMERCIAL

## 2024-03-29 NOTE — TELEPHONE ENCOUNTER
Patient called office today advising that [facility] is on a shortage of Trulicity, she doesn't have any way to get a local pharmacy due to not having a ride and doesn't have anyone that can  for her. Patient had called local pharmacies that can deliver and was advised that Trulicity is on a back order and they do not have the medications. Patient is requesting an alternative for medication. Her next dose is scheduled on Wednesday.

## 2024-04-03 ENCOUNTER — APPOINTMENT (OUTPATIENT)
Dept: CARDIOLOGY | Facility: CLINIC | Age: 59
End: 2024-04-03
Payer: COMMERCIAL

## 2024-04-11 DIAGNOSIS — E11.69 TYPE 2 DIABETES MELLITUS WITH HYPERLIPIDEMIA (MULTI): ICD-10-CM

## 2024-04-11 DIAGNOSIS — E66.01 MORBID OBESITY (MULTI): Primary | ICD-10-CM

## 2024-04-11 DIAGNOSIS — E78.5 TYPE 2 DIABETES MELLITUS WITH HYPERLIPIDEMIA (MULTI): ICD-10-CM

## 2024-04-11 NOTE — TELEPHONE ENCOUNTER
I called [facility] to see if they had anything comparable in stock.   They advised that at the moment they have Trulicity 3 mg doses, Ozempic and then the oral Rybelsus available.     Pharmacist advised that Trulicity and Ozempic because of the  backorder they could run out of those at any point.

## 2024-04-22 DIAGNOSIS — E11.9 TYPE 2 DIABETES MELLITUS WITHOUT COMPLICATION, UNSPECIFIED WHETHER LONG TERM INSULIN USE (MULTI): Primary | ICD-10-CM

## 2024-04-22 DIAGNOSIS — E11.9 TYPE 2 DIABETES MELLITUS WITHOUT COMPLICATION, UNSPECIFIED WHETHER LONG TERM INSULIN USE (MULTI): ICD-10-CM

## 2024-04-22 RX ORDER — DULAGLUTIDE 0.75 MG/.5ML
0.75 INJECTION, SOLUTION SUBCUTANEOUS
Qty: 2 ML | Refills: 11 | Status: SHIPPED | OUTPATIENT
Start: 2024-04-28 | End: 2024-04-23

## 2024-04-23 RX ORDER — DULAGLUTIDE 0.75 MG/.5ML
INJECTION, SOLUTION SUBCUTANEOUS
Qty: 2 ML | Refills: 11 | Status: SHIPPED | OUTPATIENT
Start: 2024-04-23

## 2024-04-29 DIAGNOSIS — I10 PRIMARY HYPERTENSION: ICD-10-CM

## 2024-04-29 DIAGNOSIS — N39.46 MIXED STRESS AND URGE URINARY INCONTINENCE: ICD-10-CM

## 2024-04-29 RX ORDER — CARVEDILOL 25 MG/1
37.5 TABLET ORAL 2 TIMES DAILY
Qty: 270 TABLET | Refills: 1 | Status: SHIPPED | OUTPATIENT
Start: 2024-04-29

## 2024-04-29 RX ORDER — OXYBUTYNIN CHLORIDE 5 MG/1
5 TABLET ORAL 2 TIMES DAILY
Qty: 90 TABLET | Refills: 0 | Status: SHIPPED | OUTPATIENT
Start: 2024-04-29

## 2024-05-07 ENCOUNTER — OFFICE VISIT (OUTPATIENT)
Dept: OBSTETRICS AND GYNECOLOGY | Facility: CLINIC | Age: 59
End: 2024-05-07
Payer: COMMERCIAL

## 2024-05-07 ENCOUNTER — LAB (OUTPATIENT)
Dept: LAB | Facility: LAB | Age: 59
End: 2024-05-07
Payer: COMMERCIAL

## 2024-05-07 VITALS
DIASTOLIC BLOOD PRESSURE: 86 MMHG | BODY MASS INDEX: 45.51 KG/M2 | WEIGHT: 247.3 LBS | SYSTOLIC BLOOD PRESSURE: 132 MMHG | HEIGHT: 62 IN

## 2024-05-07 DIAGNOSIS — I10 PRIMARY HYPERTENSION: ICD-10-CM

## 2024-05-07 DIAGNOSIS — E78.2 MIXED HYPERLIPIDEMIA: ICD-10-CM

## 2024-05-07 DIAGNOSIS — Z12.31 BREAST CANCER SCREENING BY MAMMOGRAM: ICD-10-CM

## 2024-05-07 DIAGNOSIS — E03.9 ACQUIRED HYPOTHYROIDISM: ICD-10-CM

## 2024-05-07 DIAGNOSIS — E11.9 TYPE 2 DIABETES MELLITUS WITHOUT COMPLICATION, UNSPECIFIED WHETHER LONG TERM INSULIN USE (MULTI): ICD-10-CM

## 2024-05-07 DIAGNOSIS — Z01.419 NORMAL GYNECOLOGIC EXAMINATION: Primary | ICD-10-CM

## 2024-05-07 DIAGNOSIS — Z00.00 HEALTHCARE MAINTENANCE: ICD-10-CM

## 2024-05-07 DIAGNOSIS — R87.810 HUMAN PAPILLOMAVIRUS (HPV) TYPE 16 DNA DETECTED IN CERVICAL SPECIMEN: ICD-10-CM

## 2024-05-07 LAB
25(OH)D3 SERPL-MCNC: 26 NG/ML (ref 30–100)
ALBUMIN SERPL BCP-MCNC: 4 G/DL (ref 3.4–5)
ALP SERPL-CCNC: 66 U/L (ref 33–110)
ALT SERPL W P-5'-P-CCNC: 17 U/L (ref 7–45)
ANION GAP SERPL CALC-SCNC: 14 MMOL/L (ref 10–20)
AST SERPL W P-5'-P-CCNC: 17 U/L (ref 9–39)
BASOPHILS # BLD AUTO: 0.02 X10*3/UL (ref 0–0.1)
BASOPHILS NFR BLD AUTO: 0.3 %
BILIRUB SERPL-MCNC: 0.8 MG/DL (ref 0–1.2)
BUN SERPL-MCNC: 14 MG/DL (ref 6–23)
CALCIUM SERPL-MCNC: 8.9 MG/DL (ref 8.6–10.3)
CHLORIDE SERPL-SCNC: 107 MMOL/L (ref 98–107)
CHOLEST SERPL-MCNC: 92 MG/DL (ref 0–199)
CHOLESTEROL/HDL RATIO: 2.3
CO2 SERPL-SCNC: 26 MMOL/L (ref 21–32)
CREAT SERPL-MCNC: 1.21 MG/DL (ref 0.5–1.05)
EGFRCR SERPLBLD CKD-EPI 2021: 52 ML/MIN/1.73M*2
EOSINOPHIL # BLD AUTO: 0.11 X10*3/UL (ref 0–0.7)
EOSINOPHIL NFR BLD AUTO: 1.7 %
ERYTHROCYTE [DISTWIDTH] IN BLOOD BY AUTOMATED COUNT: 13.2 % (ref 11.5–14.5)
EST. AVERAGE GLUCOSE BLD GHB EST-MCNC: 114 MG/DL
GLUCOSE SERPL-MCNC: 102 MG/DL (ref 74–99)
HBA1C MFR BLD: 5.6 %
HCT VFR BLD AUTO: 38.2 % (ref 36–46)
HDLC SERPL-MCNC: 39.9 MG/DL
HGB BLD-MCNC: 12.3 G/DL (ref 12–16)
HIV 1+2 AB+HIV1 P24 AG SERPL QL IA: NONREACTIVE
IMM GRANULOCYTES # BLD AUTO: 0.02 X10*3/UL (ref 0–0.7)
IMM GRANULOCYTES NFR BLD AUTO: 0.3 % (ref 0–0.9)
LDLC SERPL CALC-MCNC: 12 MG/DL
LYMPHOCYTES # BLD AUTO: 3.13 X10*3/UL (ref 1.2–4.8)
LYMPHOCYTES NFR BLD AUTO: 47.6 %
MCH RBC QN AUTO: 29.6 PG (ref 26–34)
MCHC RBC AUTO-ENTMCNC: 32.2 G/DL (ref 32–36)
MCV RBC AUTO: 92 FL (ref 80–100)
MONOCYTES # BLD AUTO: 0.47 X10*3/UL (ref 0.1–1)
MONOCYTES NFR BLD AUTO: 7.1 %
NEUTROPHILS # BLD AUTO: 2.83 X10*3/UL (ref 1.2–7.7)
NEUTROPHILS NFR BLD AUTO: 43 %
NON HDL CHOLESTEROL: 52 MG/DL (ref 0–149)
NRBC BLD-RTO: 0 /100 WBCS (ref 0–0)
PLATELET # BLD AUTO: 188 X10*3/UL (ref 150–450)
POTASSIUM SERPL-SCNC: 4.5 MMOL/L (ref 3.5–5.3)
PROT SERPL-MCNC: 6.8 G/DL (ref 6.4–8.2)
RBC # BLD AUTO: 4.16 X10*6/UL (ref 4–5.2)
SODIUM SERPL-SCNC: 142 MMOL/L (ref 136–145)
T4 FREE SERPL-MCNC: 1.34 NG/DL (ref 0.61–1.12)
TRIGL SERPL-MCNC: 202 MG/DL (ref 0–149)
TSH SERPL-ACNC: 0.18 MIU/L (ref 0.44–3.98)
VLDL: 40 MG/DL (ref 0–40)
WBC # BLD AUTO: 6.6 X10*3/UL (ref 4.4–11.3)

## 2024-05-07 PROCEDURE — 4010F ACE/ARB THERAPY RXD/TAKEN: CPT | Performed by: OBSTETRICS & GYNECOLOGY

## 2024-05-07 PROCEDURE — 84439 ASSAY OF FREE THYROXINE: CPT

## 2024-05-07 PROCEDURE — 36415 COLL VENOUS BLD VENIPUNCTURE: CPT

## 2024-05-07 PROCEDURE — 1036F TOBACCO NON-USER: CPT | Performed by: OBSTETRICS & GYNECOLOGY

## 2024-05-07 PROCEDURE — 3044F HG A1C LEVEL LT 7.0%: CPT | Performed by: OBSTETRICS & GYNECOLOGY

## 2024-05-07 PROCEDURE — 3008F BODY MASS INDEX DOCD: CPT | Performed by: OBSTETRICS & GYNECOLOGY

## 2024-05-07 PROCEDURE — 82306 VITAMIN D 25 HYDROXY: CPT

## 2024-05-07 PROCEDURE — 83036 HEMOGLOBIN GLYCOSYLATED A1C: CPT

## 2024-05-07 PROCEDURE — 99396 PREV VISIT EST AGE 40-64: CPT | Performed by: OBSTETRICS & GYNECOLOGY

## 2024-05-07 PROCEDURE — 84443 ASSAY THYROID STIM HORMONE: CPT

## 2024-05-07 PROCEDURE — 87389 HIV-1 AG W/HIV-1&-2 AB AG IA: CPT

## 2024-05-07 PROCEDURE — 88175 CYTOPATH C/V AUTO FLUID REDO: CPT

## 2024-05-07 PROCEDURE — 3048F LDL-C <100 MG/DL: CPT | Performed by: OBSTETRICS & GYNECOLOGY

## 2024-05-07 PROCEDURE — 87624 HPV HI-RISK TYP POOLED RSLT: CPT

## 2024-05-07 PROCEDURE — 3075F SYST BP GE 130 - 139MM HG: CPT | Performed by: OBSTETRICS & GYNECOLOGY

## 2024-05-07 PROCEDURE — 80061 LIPID PANEL: CPT

## 2024-05-07 PROCEDURE — 80053 COMPREHEN METABOLIC PANEL: CPT

## 2024-05-07 PROCEDURE — 3061F NEG MICROALBUMINURIA REV: CPT | Performed by: OBSTETRICS & GYNECOLOGY

## 2024-05-07 PROCEDURE — 85025 COMPLETE CBC W/AUTO DIFF WBC: CPT

## 2024-05-07 PROCEDURE — 3079F DIAST BP 80-89 MM HG: CPT | Performed by: OBSTETRICS & GYNECOLOGY

## 2024-05-07 NOTE — PROGRESS NOTES
"GYNECOLOGY PROGRESS NOTE        CC:    Chief Complaint   Patient presents with    Annual Exam     Est patient - annual exam - no other issues  Pap 5/2023 neg HPV Rosario 16 pos / Colpo 8/2023  Mamm 12/2022 scat fibro tissue  Colon 2022  Chaperone betty schultz ma        HPI:  Joselin Dunn is here for a routine GYN examination.  No GYN c/o, no AUB.            ROS:  GI - no blood in BMs  URO - no hematuria  GYN - no AUB or vaginal discharge  PSYCH - mood stable (on Rx, no SI)        PHYSICAL EXAM:  /86 (BP Location: Left arm, Patient Position: Sitting)   Ht 1.575 m (5' 2\")   Wt 112 kg (247 lb 4.8 oz)   BMI 45.23 kg/m²   GEN:  A&O, NAD  ABD:  NT/ND, soft, no palpable masses, morbidly obese  URO:  normal urethra, no bladder TTP  GYN:  normal vulva and perineum w/o lesions or ulcers, normal vagina without discharge or lesions, normal cervix without lesions or discharge or CMT, bimanual exam--extremely limited due to body habitus but no gross masses or TTP   BREAST:  no masses or TTP, no skin lesions or nipple discharge  PSYCH:  normal affect, non-anxious        IMPRESSION/PLAN:  Problem List Items Addressed This Visit    None  Visit Diagnoses       Normal gynecologic examination    -  Primary    Breast cancer screening by mammogram        Relevant Orders    BI mammo bilateral screening tomosynthesis    Human papillomavirus (HPV) type 16 DNA detected in cervical specimen        Relevant Orders    THINPREP PAP TEST          Abnormal pap:  S/P COLPO for + HPV16 pap in 2023, results=normal.  Per [facility] guidelines post-COLPO surveillance plan is repeat Pap/HPV testing in 12 months.  Repeat pap/HPV done today.  Smoking cessation in 2020.  Gardasil vaccination:  n/a.    Mammogram up to date and normal.  Density is scattered.  Elects for screening mammograms yearly after counseling on guidelines.    Colon CA screening:  colonoscopy in 2022 with polyps, repeat due in 5 years (2027).        Jomar Motta, DO  "

## 2024-05-08 PROBLEM — Z01.419 NORMAL GYNECOLOGIC EXAMINATION: Status: ACTIVE | Noted: 2024-05-08

## 2024-05-08 PROBLEM — R87.810 HUMAN PAPILLOMAVIRUS (HPV) TYPE 16 DNA DETECTED IN CERVICAL SPECIMEN: Status: ACTIVE | Noted: 2024-05-08

## 2024-05-14 ENCOUNTER — HOSPITAL ENCOUNTER (OUTPATIENT)
Dept: RADIOLOGY | Facility: CLINIC | Age: 59
Discharge: HOME | End: 2024-05-14
Payer: COMMERCIAL

## 2024-05-14 ENCOUNTER — OFFICE VISIT (OUTPATIENT)
Dept: ORTHOPEDIC SURGERY | Facility: CLINIC | Age: 59
End: 2024-05-14
Payer: COMMERCIAL

## 2024-05-14 DIAGNOSIS — M19.049 CMC ARTHRITIS: ICD-10-CM

## 2024-05-14 PROCEDURE — 3044F HG A1C LEVEL LT 7.0%: CPT | Performed by: ORTHOPAEDIC SURGERY

## 2024-05-14 PROCEDURE — 73140 X-RAY EXAM OF FINGER(S): CPT | Mod: RT

## 2024-05-14 PROCEDURE — 99214 OFFICE O/P EST MOD 30 MIN: CPT | Performed by: ORTHOPAEDIC SURGERY

## 2024-05-14 PROCEDURE — 20600 DRAIN/INJ JOINT/BURSA W/O US: CPT | Mod: RT | Performed by: ORTHOPAEDIC SURGERY

## 2024-05-14 PROCEDURE — 3048F LDL-C <100 MG/DL: CPT | Performed by: ORTHOPAEDIC SURGERY

## 2024-05-14 PROCEDURE — 73140 X-RAY EXAM OF FINGER(S): CPT | Mod: LEFT SIDE | Performed by: ORTHOPAEDIC SURGERY

## 2024-05-14 PROCEDURE — 20600 DRAIN/INJ JOINT/BURSA W/O US: CPT | Mod: LT | Performed by: ORTHOPAEDIC SURGERY

## 2024-05-14 PROCEDURE — 4010F ACE/ARB THERAPY RXD/TAKEN: CPT | Performed by: ORTHOPAEDIC SURGERY

## 2024-05-14 PROCEDURE — 2500000004 HC RX 250 GENERAL PHARMACY W/ HCPCS (ALT 636 FOR OP/ED): Performed by: ORTHOPAEDIC SURGERY

## 2024-05-14 PROCEDURE — 1036F TOBACCO NON-USER: CPT | Performed by: ORTHOPAEDIC SURGERY

## 2024-05-14 PROCEDURE — 73140 X-RAY EXAM OF FINGER(S): CPT | Mod: LT

## 2024-05-14 PROCEDURE — 3061F NEG MICROALBUMINURIA REV: CPT | Performed by: ORTHOPAEDIC SURGERY

## 2024-05-14 PROCEDURE — 73140 X-RAY EXAM OF FINGER(S): CPT | Mod: RIGHT SIDE | Performed by: ORTHOPAEDIC SURGERY

## 2024-05-14 PROCEDURE — 3008F BODY MASS INDEX DOCD: CPT | Performed by: ORTHOPAEDIC SURGERY

## 2024-05-14 PROCEDURE — 2500000005 HC RX 250 GENERAL PHARMACY W/O HCPCS: Performed by: ORTHOPAEDIC SURGERY

## 2024-05-14 RX ORDER — LIDOCAINE HYDROCHLORIDE 10 MG/ML
0.5 INJECTION INFILTRATION; PERINEURAL
Status: COMPLETED | OUTPATIENT
Start: 2024-05-14 | End: 2024-05-14

## 2024-05-14 RX ADMIN — TRIAMCINOLONE ACETONIDE 5 MG: 10 INJECTION, SUSPENSION INTRA-ARTICULAR; INTRALESIONAL at 14:27

## 2024-05-14 RX ADMIN — LIDOCAINE HYDROCHLORIDE 0.5 ML: 10 INJECTION, SOLUTION INFILTRATION; PERINEURAL at 14:27

## 2024-05-14 NOTE — PROGRESS NOTES
5/14/2024    Chief Complaint   Patient presents with    Left Hand - Follow-up     B/L thumb CMC  Xrays today    Right Hand - Follow-up     B/L thumb CMC  Xrays today       History of Present Illness:  Patient Joselin Dunn , 59 y.o. female, presents today, 5/14/2024, for evaluation of bilateral thumb pain.  She underwent bilateral thumb CMC injections in July of last year.  She has a good symptomatic relief since then, however, over the last 2 weeks she has had increasing pain and discomfort and difficulties with pinch and .  This is worse on the left comparison the right.  No new injury or trauma.  She is inquire about repeat injection today.       Review of Systems:   GENERAL: Negative  GI: Negative  MUSCULOSKELETAL: See HPI  SKIN: Negative  NEURO:  Negative     Physical Exam:  GENERAL:  Alert and oriented to person, place, and time.  No acute distress and breathing comfortably; pleasant and cooperative with the examination.  HEENT:  Head is normocephalic and atraumatic.  NECK:  Supple, no visible swelling.  CARDIOVASCULAR:  No palpable tachycardia.  LUNGS:  No audible wheezing or labored breathing.  ABDOMEN:  Nondistended.  Extremities: Evaluation of bilateral upper extremities finds the patient to have a palpable radial artery at the wrist with brisk capillary refill to all digits. The patient has intact sensorium to axillary, radial, median and ulnar nerves. There are no open wounds. There are no signs of infection. There is no evidence of lymphedema or lymphatic streaking. The patient has supple compartments of the bilateral arms, forearms and hands. Tenderness palpation over the bilateral thumb CMC articulations.     Imaging/Test Results:  None today.     Assessment:  Bilateral thumb CMC osteoarthritis.     Plan:  Operative and nonoperative treatment strategies were discussed.  Patient has strong preference for continued nonoperative management through Kenalog injection into the CMC joint.  This was  performed in office today by Dr. Van and tolerated by patient.  Continue with bracing as needed for comfort and for activities as needed.  Follow-up on an as-needed basis as symptoms dictate.  No x-rays necessary upon return.  Continue with all activities as tolerated.  All questions answered at today's visit.    Hand / UE Inj/Asp: R thumb CMC for osteoarthritis on 5/14/2024 2:27 PM  Indications: pain  Details: 25 G needle, dorsal approach  Medications: 5 mg triamcinolone acetonide 10 mg/mL; 0.5 mL lidocaine 10 mg/mL (1 %)  Outcome: tolerated well, no immediate complications    Right Thumb Carpometacarpal Joint Injection: It was explained to the patient that the risks of a steroid injection include but are not limited to infection, local skin irritation, skin atrophy, calcification, continued pain and discomfort, elevated blood sugar, burning, failure to relieve pain, and possible late infection. The patient verbalized good insight and verbalized consent for the injection. It was further explained that the postinjection discomfort can be alleviated with additional medications, ice, elevation, and rest over the first 24 hours, and that these modalities are recommended.    Using aseptic technique, a solution containing 0.5 cc of 5 mg of Kenalog and 0.5 mL of 1% lidocaine without epinephrine was injected intraarticularly to the right thumb carpometacarpal joint. Digital palpation was used to localize the CMC articulation about the dorsal radial aspect of the joint. Gentle traction was then imparted to the thumb distally and a 25-gauge needle was advanced through the skin, subcutaneous tissue and capsule. The injection was then administered and the patient tolerated the injection well. A band-aid was then placed. It should be noted that ethyl chloride spray was used to make the injection delivery more comfortable for the patient.  Procedure, treatment alternatives, risks and benefits explained, specific risks  discussed. Consent was given by the patient. Immediately prior to procedure a time out was called to verify the correct patient, procedure, equipment, support staff and site/side marked as required. Patient was prepped and draped in the usual sterile fashion.       Hand / UE Inj/Asp: L thumb CMC for osteoarthritis on 5/14/2024 2:27 PM  Indications: pain  Details: 25 G needle, dorsal approach  Medications: 5 mg triamcinolone acetonide 10 mg/mL; 0.5 mL lidocaine 10 mg/mL (1 %)  Outcome: tolerated well, no immediate complications    Left Thumb Carpometacarpal Joint Injection: It was explained to the patient that the risks of a steroid injection include but are not limited to infection, local skin irritation, skin atrophy, calcification, continued pain and discomfort, elevated blood sugar, burning, failure to relieve pain, and possible late infection. The patient verbalized good insight and verbalized consent for the injection. It was further explained that the postinjection discomfort can be alleviated with additional medications, ice, elevation, and rest over the first 24 hours, and that these modalities are recommended.    Using aseptic technique, a solution containing 0.5 cc of 5 mg of Kenalog and 0.5 mL of 1% lidocaine without epinephrine was injected intraarticularly to the left thumb carpometacarpal joint. Digital palpation was used to localize the CMC articulation about the dorsal radial aspect of the joint. Gentle traction was then imparted to the thumb distally and a 25-gauge needle was advanced through the skin, subcutaneous tissue and capsule. The injection was then administered and the patient tolerated the injection well. A band-aid was then placed. It should be noted that ethyl chloride spray was used to make the injection delivery more comfortable for the patient.  Procedure, treatment alternatives, risks and benefits explained, specific risks discussed. Consent was given by the patient. Immediately  prior to procedure a time out was called to verify the correct patient, procedure, equipment, support staff and site/side marked as required. Patient was prepped and draped in the usual sterile fashion.         In a face to face encounter, I performed a history and physical examination, discussed pertinent diagnostic studies if indicated, and discussed diagnosis and management strategies with both the patient and the mid-level provider. I reviewed the mid-level's note and agree with the documented findings and plan of care.  Patient presents today for evaluation of symptomatic bilateral thumb CMC arthritis.  Focal tenderness to bilateral thumb CMC joints on today's exam.  We talked about operative and nonoperative strategies.  Patient elects for steroid injection to bilateral thumb CMC joints and to follow-up in 6 weeks or in 3 months.  No x-rays upon return.

## 2024-05-20 ENCOUNTER — TELEPHONE (OUTPATIENT)
Dept: OBSTETRICS AND GYNECOLOGY | Facility: CLINIC | Age: 59
End: 2024-05-20
Payer: COMMERCIAL

## 2024-05-20 LAB
CYTOLOGY CMNT CVX/VAG CYTO-IMP: NORMAL
HPV HR 12 DNA GENITAL QL NAA+PROBE: NEGATIVE
HPV HR GENOTYPES PNL CVX NAA+PROBE: POSITIVE
HPV16 DNA SPEC QL NAA+PROBE: POSITIVE
HPV18 DNA SPEC QL NAA+PROBE: NEGATIVE
LAB AP HPV GENOTYPE QUESTION: YES
LAB AP HPV HR: NORMAL
LAB AP PREVIOUS ABNORMAL HISTORY: NORMAL
LABORATORY COMMENT REPORT: NORMAL
LMP START DATE: NORMAL
PATH REPORT.TOTAL CANCER: NORMAL

## 2024-05-20 NOTE — TELEPHONE ENCOUNTER
----- Message from Jomar Motta DO sent at 5/20/2024  2:08 PM EDT -----  Call patient please.  Her Pap smear results were abnormal still--HPV 16 positive, normal Pap needs repeat colposcopy--please schedule.

## 2024-05-20 NOTE — TELEPHONE ENCOUNTER
Spoke with patient regarding results and recommendation.  Patient scheduled for the procedure.  Reviewed and approved by EVELIN FITZPATRICK on 5/20/24 at 2:23 PM.

## 2024-05-20 NOTE — RESULT ENCOUNTER NOTE
Call patient please.  Her Pap smear results were abnormal still--HPV 16 positive, normal Pap needs repeat colposcopy--please schedule.

## 2024-05-29 ENCOUNTER — OFFICE VISIT (OUTPATIENT)
Dept: PRIMARY CARE | Facility: CLINIC | Age: 59
End: 2024-05-29
Payer: COMMERCIAL

## 2024-05-29 VITALS
DIASTOLIC BLOOD PRESSURE: 72 MMHG | SYSTOLIC BLOOD PRESSURE: 126 MMHG | BODY MASS INDEX: 44.16 KG/M2 | WEIGHT: 240 LBS | TEMPERATURE: 98.5 F | HEIGHT: 62 IN | HEART RATE: 72 BPM

## 2024-05-29 DIAGNOSIS — Z00.00 HEALTHCARE MAINTENANCE: ICD-10-CM

## 2024-05-29 DIAGNOSIS — I10 PRIMARY HYPERTENSION: ICD-10-CM

## 2024-05-29 DIAGNOSIS — K21.9 GASTROESOPHAGEAL REFLUX DISEASE WITHOUT ESOPHAGITIS: ICD-10-CM

## 2024-05-29 DIAGNOSIS — E05.80 IATROGENIC HYPERTHYROIDISM: Primary | ICD-10-CM

## 2024-05-29 DIAGNOSIS — Z71.2 ENCOUNTER TO DISCUSS TEST RESULTS: ICD-10-CM

## 2024-05-29 PROCEDURE — 3048F LDL-C <100 MG/DL: CPT | Performed by: FAMILY MEDICINE

## 2024-05-29 PROCEDURE — 4010F ACE/ARB THERAPY RXD/TAKEN: CPT | Performed by: FAMILY MEDICINE

## 2024-05-29 PROCEDURE — 3078F DIAST BP <80 MM HG: CPT | Performed by: FAMILY MEDICINE

## 2024-05-29 PROCEDURE — 99214 OFFICE O/P EST MOD 30 MIN: CPT | Performed by: FAMILY MEDICINE

## 2024-05-29 PROCEDURE — 3074F SYST BP LT 130 MM HG: CPT | Performed by: FAMILY MEDICINE

## 2024-05-29 PROCEDURE — 3044F HG A1C LEVEL LT 7.0%: CPT | Performed by: FAMILY MEDICINE

## 2024-05-29 PROCEDURE — 3061F NEG MICROALBUMINURIA REV: CPT | Performed by: FAMILY MEDICINE

## 2024-05-29 PROCEDURE — 3008F BODY MASS INDEX DOCD: CPT | Performed by: FAMILY MEDICINE

## 2024-05-29 PROCEDURE — 1036F TOBACCO NON-USER: CPT | Performed by: FAMILY MEDICINE

## 2024-05-29 RX ORDER — POTASSIUM CHLORIDE 20 MEQ/1
TABLET, EXTENDED RELEASE ORAL
Qty: 90 TABLET | Refills: 1 | Status: SHIPPED | OUTPATIENT
Start: 2024-05-29

## 2024-05-29 RX ORDER — LEVOTHYROXINE SODIUM 112 UG/1
112 TABLET ORAL DAILY
Qty: 90 TABLET | Refills: 1 | Status: SHIPPED | OUTPATIENT
Start: 2024-05-29

## 2024-05-29 RX ORDER — OMEPRAZOLE 40 MG/1
40 CAPSULE, DELAYED RELEASE ORAL
Qty: 90 CAPSULE | Refills: 1 | Status: SHIPPED | OUTPATIENT
Start: 2024-05-29

## 2024-05-29 NOTE — PROGRESS NOTES
"Subjective   Chief complaint: Joselin Dunn is a 59 y.o. female who presents for Results (Patient in office today for follow up on recent blood work results).    HPI:  Here to review recent lab results.  Has changed her diet a bit and notices that she has lost weight over the last few months.    Sees Dr. Camp for cardiology and Dr. Man for psychiatry.  No concerns with her medications but asks about her Trulicity dose.    Has a pending appointment with Dr. Motta for gynecology on Nancy 3, 2024.  Has been abstinent for 23 years.  Understands she has HPV and has follow up with gynecology.    Recently saw pulmonology.  History of smoking cigarettes:  smoked approximately a pack per day since age 18.  Quit 4 years ago.  (~37 pack years)        Objective   /72 (BP Location: Left arm, Patient Position: Sitting, BP Cuff Size: Adult)   Pulse 72   Temp 36.9 °C (98.5 °F) (Temporal)   Ht 1.575 m (5' 2\")   Wt 109 kg (240 lb)   BMI 43.90 kg/m²   Physical Exam  General:  Alert, oriented, no acute distress  Eyes:  Sclerae white, PER, conjunctivae clear  ENT:  No nasal congestion.    Neck: Supple  Respiratory:  Normal breath sounds.  No wheezing, rhonchi nor crackles.  No dyspnea.  Cardiovascular:  S1 and S2 positive.  Regular rate and rhythm.  No gallops.  No murmurs.  Vascular:  No edema.  Skin warm and dry.  CNS:  No gross neurological deficits.  Gait within normal limits.    Psychiatric:  Affect is positive and appropriate.  No depression.  No anxiety.  Review of Systems   I have reviewed and reconciled the medication list with the patient today.   Current Outpatient Medications:     blood sugar diagnostic (True Metrix Glucose Test Strip) strip, 1 strip twice a day., Disp: , Rfl:     buPROPion XL (Wellbutrin XL) 300 mg 24 hr tablet, Take 1 tablet (300 mg) by mouth once daily in the morning. Take before meals., Disp: , Rfl:     carvedilol (Coreg) 25 mg tablet, Take 1 and 1/2 tablets by mouth twice daily., Disp: " 270 tablet, Rfl: 1    cholecalciferol (Vitamin D-3) 25 MCG (1000 UT) tablet, once every 24 hours., Disp: , Rfl:     dilTIAZem ER (Tiazac) 120 mg 24 hr capsule, Take 1 capsule (120 mg) by mouth once daily., Disp: , Rfl:     lisinopril 20 mg tablet, 1 (one) time each day at the same time., Disp: 90 tablet, Rfl: 3    metFORMIN (Glucophage) 500 mg tablet, Take 1 tablet by mouth every 12 hours., Disp: 90 tablet, Rfl: 3    oxybutynin (Ditropan) 5 mg tablet, Take 1 tablet by mouth twice daily. (Patient taking differently: Take 1 tablet (5 mg) by mouth once daily.), Disp: 90 tablet, Rfl: 0    rosuvastatin (Crestor) 20 mg tablet, 1 (one) time each day at the same time., Disp: 90 tablet, Rfl: 3    Trulicity 0.75 mg/0.5 mL pen injector, Inject 0.75 mg under the skin once weekly., Disp: 2 mL, Rfl: 11    levothyroxine (Synthroid, Levoxyl) 112 mcg tablet, Take 1 tablet (112 mcg) by mouth early in the morning.. Take on an empty stomach at the same time each day, either 30 to 60 minutes prior to breakfast, Disp: 90 tablet, Rfl: 1    omeprazole (PriLOSEC) 40 mg DR capsule, Take 1 capsule (40 mg) by mouth once daily in the morning. Take before meals., Disp: 90 capsule, Rfl: 1    potassium chloride CR 20 mEq ER tablet, 1 (one) time each day at the same time., Disp: 90 tablet, Rfl: 1     Imaging:  XR thumb right MIN 2 views    Result Date: 5/14/2024  Interpreted By:  Brain Peralta, STUDY: XR THUMB RIGHT MIN 2 VIEWS; 5/14/2024 1:50 pm   INDICATION: Signs/Symptoms:pain.   ACCESSION NUMBER(S): [id number]   ORDERING CLINICIAN: BRAIN PERLATA   FINDINGS: X-rays of the right thumb show severe arthritic change to CMC articulation. Moderate arthritic change to interphalangeal joint. No acute fracture or dislocation.     Signed by: Brain Peralta 5/14/2024 4:39 PM Dictation workstation:   [id number]    XR thumb left MIN 2 views    Result Date: 5/14/2024  Interpreted By:  Brain Peralta, STUDY: XR THUMB LEFT MIN 2 VIEWS; 5/14/2024 1:51 pm    INDICATION: Signs/Symptoms:pain.   ACCESSION NUMBER(S): [id number]   ORDERING CLINICIAN: BRAIN PERALTA   FINDINGS: X-rays of the left thumb show severe arthritic change at interphalangeal joint and CMC. Moderate arthritic change to wrist STT and thumb MCP. No acute fracture or dislocation.     Signed by: Brain Peralta 5/14/2024 4:38 PM Dictation workstation:   [id number]       Labs reviewed:    Lab Results   Component Value Date    WBC 6.6 05/07/2024    HGB 12.3 05/07/2024    HCT 38.2 05/07/2024     05/07/2024    CHOL 92 05/07/2024    TRIG 202 (H) 05/07/2024    HDL 39.9 05/07/2024    ALT 17 05/07/2024    AST 17 05/07/2024     05/07/2024    K 4.5 05/07/2024     05/07/2024    CREATININE 1.21 (H) 05/07/2024    BUN 14 05/07/2024    CO2 26 05/07/2024    TSH 0.18 (L) 05/07/2024    HGBA1C 5.6 05/07/2024       Assessment/Plan   Problem List Items Addressed This Visit       Encounter to discuss test results     Reviewed lab/testing results with the patient and provided patient education regarding the results.           Primary hypertension     Well-controlled.  Continue present management.         Relevant Medications    potassium chloride CR 20 mEq ER tablet     Other Visit Diagnoses       Iatrogenic hyperthyroidism    -  Primary    Relevant Medications    levothyroxine (Synthroid, Levoxyl) 112 mcg tablet    Other Relevant Orders    Tsh With Reflex To Free T4 If Abnormal    Gastroesophageal reflux disease without esophagitis        Relevant Medications    omeprazole (PriLOSEC) 40 mg DR capsule    Healthcare maintenance        Relevant Orders    CT lung screening low dose            Continue other medications as listed  Follow up in July.  Sooner as needed.

## 2024-05-31 ENCOUNTER — HOSPITAL ENCOUNTER (OUTPATIENT)
Dept: RADIOLOGY | Facility: HOSPITAL | Age: 59
Discharge: HOME | End: 2024-05-31
Payer: COMMERCIAL

## 2024-05-31 DIAGNOSIS — Z12.31 BREAST CANCER SCREENING BY MAMMOGRAM: ICD-10-CM

## 2024-05-31 PROCEDURE — 77067 SCR MAMMO BI INCL CAD: CPT | Performed by: RADIOLOGY

## 2024-05-31 PROCEDURE — 77063 BREAST TOMOSYNTHESIS BI: CPT | Performed by: RADIOLOGY

## 2024-05-31 PROCEDURE — 77067 SCR MAMMO BI INCL CAD: CPT

## 2024-06-11 ENCOUNTER — PROCEDURE VISIT (OUTPATIENT)
Dept: OBSTETRICS AND GYNECOLOGY | Facility: CLINIC | Age: 59
End: 2024-06-11
Payer: COMMERCIAL

## 2024-06-11 VITALS
SYSTOLIC BLOOD PRESSURE: 130 MMHG | HEIGHT: 62 IN | DIASTOLIC BLOOD PRESSURE: 74 MMHG | BODY MASS INDEX: 44.29 KG/M2 | WEIGHT: 240.7 LBS

## 2024-06-11 DIAGNOSIS — R87.810 HUMAN PAPILLOMAVIRUS (HPV) TYPE 16 DNA DETECTED IN CERVICAL SPECIMEN: Primary | ICD-10-CM

## 2024-06-11 PROCEDURE — 88305 TISSUE EXAM BY PATHOLOGIST: CPT

## 2024-06-11 PROCEDURE — 57454 BX/CURETT OF CERVIX W/SCOPE: CPT | Performed by: OBSTETRICS & GYNECOLOGY

## 2024-06-11 NOTE — PROGRESS NOTES
Patient ID: Joselin Dunn is a 59 y.o. female for repeat colposcopy for abnormal Pap smear.  Recent Pap smear normal with positive HPV 16.  This was the same Pap smear that she had in 2023 with a normal colposcopy.  No current smoking but has smoked in the past.      Procedures    COLPOSCOPY PROCEDURE NOTE    Patient was consented after risks reviewed.  Patient was placed in lithotomy position and a speculum was placed.  The cervix was fully visualized and was cleansed with saline.  Visual inspection was performed and the information zone was fully visualized.  Acetic acid was placed on the cervix and findings include minimal aceto-white changes, no atypical lesions seen. Lugol's iodine solution was then placed on the cervix and no non-staining areas were identified (see cervix diagram).  Cervical biopsies were performed at 10/2/7 o'clock, along with an ECC performed and cytology collected with Cytobrush.   Silver nitrate was applied for additional hemostasis.  Patient tolerated the procedure well, there was minimal EBL and there were no complications.        BLUE=TZ  RED=biopsy sites        Problem List Items Addressed This Visit       Human papillomavirus (HPV) type 16 DNA detected in cervical specimen - Primary       Abnormal pap smear:  Pap results=normal, + HPV 16.  2023 pap same with normal COLPO.  Counseling done on abnormal paps and COLPO.  Natural course of HPV and high rates of regression of LGSIL reviewed with patient.  Need for treatment if HGSIL/CIN3 and treatment vs close surveillance with HGSIL/CIN2 discussed.  COLPO done today, patient tolerated well.  NOT GOOD candidate for office LEEP due to very RT lateral location of cervix.        F/U for TELE visit in 2-3 weeks for discussion of COLPO results.      Jomar Motta DO

## 2024-06-18 ENCOUNTER — HOSPITAL ENCOUNTER (OUTPATIENT)
Dept: RADIOLOGY | Facility: HOSPITAL | Age: 59
Discharge: HOME | End: 2024-06-18
Payer: COMMERCIAL

## 2024-06-18 DIAGNOSIS — Z00.00 HEALTHCARE MAINTENANCE: ICD-10-CM

## 2024-06-18 PROCEDURE — 71271 CT THORAX LUNG CANCER SCR C-: CPT

## 2024-06-19 DIAGNOSIS — E11.9 TYPE 2 DIABETES MELLITUS WITHOUT COMPLICATION, UNSPECIFIED WHETHER LONG TERM INSULIN USE (MULTI): ICD-10-CM

## 2024-06-19 LAB
LABORATORY COMMENT REPORT: NORMAL
PATH REPORT.FINAL DX SPEC: NORMAL
PATH REPORT.GROSS SPEC: NORMAL
PATH REPORT.RELEVANT HX SPEC: NORMAL
PATH REPORT.TOTAL CANCER: NORMAL

## 2024-06-19 RX ORDER — CALCIUM CITRATE/VITAMIN D3 200MG-6.25
1 TABLET ORAL 2 TIMES DAILY
Qty: 100 STRIP | Refills: 3 | Status: SHIPPED | OUTPATIENT
Start: 2024-06-19

## 2024-06-26 ENCOUNTER — APPOINTMENT (OUTPATIENT)
Dept: CARDIOLOGY | Facility: CLINIC | Age: 59
End: 2024-06-26
Payer: COMMERCIAL

## 2024-07-02 ENCOUNTER — PREP FOR PROCEDURE (OUTPATIENT)
Dept: OBSTETRICS AND GYNECOLOGY | Facility: CLINIC | Age: 59
End: 2024-07-02

## 2024-07-02 ENCOUNTER — APPOINTMENT (OUTPATIENT)
Dept: OBSTETRICS AND GYNECOLOGY | Facility: CLINIC | Age: 59
End: 2024-07-02
Payer: COMMERCIAL

## 2024-07-02 DIAGNOSIS — E66.9 OBESITY (BMI 30-39.9): ICD-10-CM

## 2024-07-02 DIAGNOSIS — D06.9 HIGH GRADE SQUAMOUS INTRAEPITHELIAL LESION (HGSIL), GRADE 3 CIN, ON BIOPSY OF CERVIX: Primary | ICD-10-CM

## 2024-07-02 DIAGNOSIS — E11.9 TYPE 2 DIABETES MELLITUS WITHOUT COMPLICATION, WITHOUT LONG-TERM CURRENT USE OF INSULIN (MULTI): ICD-10-CM

## 2024-07-02 DIAGNOSIS — J44.9 CHRONIC OBSTRUCTIVE PULMONARY DISEASE, UNSPECIFIED COPD TYPE (MULTI): Chronic | ICD-10-CM

## 2024-07-02 DIAGNOSIS — I10 PRIMARY HYPERTENSION: ICD-10-CM

## 2024-07-02 PROBLEM — Z99.81 CHRONIC RESPIRATORY FAILURE WITH HYPOXIA, ON HOME OXYGEN THERAPY (MULTI): Status: RESOLVED | Noted: 2019-06-17 | Resolved: 2024-07-02

## 2024-07-02 PROBLEM — J96.11 CHRONIC RESPIRATORY FAILURE WITH HYPOXIA, ON HOME OXYGEN THERAPY (MULTI): Status: RESOLVED | Noted: 2019-06-17 | Resolved: 2024-07-02

## 2024-07-02 PROCEDURE — 3044F HG A1C LEVEL LT 7.0%: CPT | Performed by: OBSTETRICS & GYNECOLOGY

## 2024-07-02 PROCEDURE — 99214 OFFICE O/P EST MOD 30 MIN: CPT | Performed by: OBSTETRICS & GYNECOLOGY

## 2024-07-02 PROCEDURE — 3048F LDL-C <100 MG/DL: CPT | Performed by: OBSTETRICS & GYNECOLOGY

## 2024-07-02 PROCEDURE — 4010F ACE/ARB THERAPY RXD/TAKEN: CPT | Performed by: OBSTETRICS & GYNECOLOGY

## 2024-07-02 PROCEDURE — 3061F NEG MICROALBUMINURIA REV: CPT | Performed by: OBSTETRICS & GYNECOLOGY

## 2024-07-02 PROCEDURE — 3008F BODY MASS INDEX DOCD: CPT | Performed by: OBSTETRICS & GYNECOLOGY

## 2024-07-02 RX ORDER — SODIUM CHLORIDE, SODIUM LACTATE, POTASSIUM CHLORIDE, CALCIUM CHLORIDE 600; 310; 30; 20 MG/100ML; MG/100ML; MG/100ML; MG/100ML
100 INJECTION, SOLUTION INTRAVENOUS CONTINUOUS
OUTPATIENT
Start: 2024-07-02

## 2024-07-02 RX ORDER — TRANEXAMIC ACID 650 MG/1
1300 TABLET ORAL ONCE
OUTPATIENT
Start: 2024-07-02 | End: 2024-07-02

## 2024-07-02 NOTE — PROGRESS NOTES
GYNECOLOGY VIRTUAL VISIT PROGRESS NOTE          CC:     Chief Complaint   Patient presents with    Follow-up     COLPO results        HPI:  Joselin Dunn is scheduled today for virtual visit to discuss test results.   Patient had issues earlier in the day with the Epic virtual visit platform and missed her visit and thus her visit was rescheduled to the afternoon.  I-lighting Audio and Visual communication real-time were utilized and verbal consent was obtained for the encounter.  Patient at home at time of visit.    No new GYN complaints.  Did OK after COLPO.      ROS:  GYN - see HPI      PHYSICAL EXAM:  VS:  n/a (virtual visit)  GEN:  A&O, NAD  PSYCH:  normal affect, non-anxious      IMPRESSION/PLAN:    Problem List Items Addressed This Visit    None  Visit Diagnoses       High grade squamous intraepithelial lesion (HGSIL), grade 3 ARIELA, on biopsy of cervix    -  Primary          HGSIL:  COLPO results of HGSIL/CIN3 on ECC sampling reviewed with patient.   Treatment options reviewed with patient--given postmenopausal status and HGSIL limited to the endocervix recommending CKC--patient agreeable.  Surgical risks reviewed (bleeding, infection, incomplete resection, cervical scarring, and anesthesia complications) and surgical consent form sent to patient for electronic signature.  Preop labs ordered, COVID and surgery counseling done.  Surgery comorbidities include morbid obesity, DM, HTN, COPD, and history of cardiomyopathy.  Discussed with the patient that she would need to hold her Trulicity for 1 week preop and would need to take her beta-blocker the morning of surgery, all of her other medications can be held the morning of surgery and resumed later that day.        Jomar Motta DO

## 2024-07-03 ENCOUNTER — APPOINTMENT (OUTPATIENT)
Dept: PRIMARY CARE | Facility: CLINIC | Age: 59
End: 2024-07-03
Payer: COMMERCIAL

## 2024-07-03 DIAGNOSIS — Z71.2 ENCOUNTER TO DISCUSS TEST RESULTS: ICD-10-CM

## 2024-07-03 DIAGNOSIS — E05.80 IATROGENIC HYPERTHYROIDISM: Primary | ICD-10-CM

## 2024-07-03 DIAGNOSIS — E55.9 VITAMIN D INSUFFICIENCY: ICD-10-CM

## 2024-07-03 DIAGNOSIS — E11.9 TYPE 2 DIABETES MELLITUS WITHOUT COMPLICATION, UNSPECIFIED WHETHER LONG TERM INSULIN USE (MULTI): ICD-10-CM

## 2024-07-03 PROBLEM — D06.9 HIGH GRADE SQUAMOUS INTRAEPITHELIAL LESION (HGSIL), GRADE 3 CIN, ON BIOPSY OF CERVIX: Status: ACTIVE | Noted: 2024-07-02

## 2024-07-03 PROCEDURE — 3061F NEG MICROALBUMINURIA REV: CPT | Performed by: FAMILY MEDICINE

## 2024-07-03 PROCEDURE — 3008F BODY MASS INDEX DOCD: CPT | Performed by: FAMILY MEDICINE

## 2024-07-03 PROCEDURE — 1036F TOBACCO NON-USER: CPT | Performed by: FAMILY MEDICINE

## 2024-07-03 PROCEDURE — 3044F HG A1C LEVEL LT 7.0%: CPT | Performed by: FAMILY MEDICINE

## 2024-07-03 PROCEDURE — 99213 OFFICE O/P EST LOW 20 MIN: CPT | Performed by: FAMILY MEDICINE

## 2024-07-03 PROCEDURE — 4010F ACE/ARB THERAPY RXD/TAKEN: CPT | Performed by: FAMILY MEDICINE

## 2024-07-03 PROCEDURE — 3048F LDL-C <100 MG/DL: CPT | Performed by: FAMILY MEDICINE

## 2024-07-03 NOTE — PROGRESS NOTES
"Subjective   Chief complaint: Joselin Dunn is a 59 y.o. female who presents for Results (Patient is being called today to go over CT lung results. Patient advised that it wasn't necessary to go through all medications they are all \"yes\". ).    HPI:  Virtual or Telephone Consent    A telephone visit (audio only) between the patient (at the originating site) and the provider (at the distant site) was utilized to provide this telehealth service.   Verbal consent was requested and obtained from Joselin Dunn on this date, 07/03/24 for a telehealth visit.    Patient recently had some testing done as appointment is to review the results together with her.  She has been feeling fine.  No changes she was last in.    Recently had her lung scan done as well as her blood test and would like to review the results together.  No chest pains, shortness of breath or GI complaints reported.  No other concerns for today.            Objective   LMP  (LMP Unknown)   Physical Exam  Alert, pleasant, no acute distress  Respirations non-labored.  No Dyspnea.  Mood is positive and appropriate.    Review of Systems   I have reviewed and reconciled the medication list with the patient today.   Current Outpatient Medications:     blood sugar diagnostic (True Metrix Glucose Test Strip) strip, 1 strip by subdermal route 2 times a day., Disp: 100 strip, Rfl: 3    buPROPion XL (Wellbutrin XL) 300 mg 24 hr tablet, Take 1 tablet (300 mg) by mouth once daily in the morning. Take before meals., Disp: , Rfl:     carvedilol (Coreg) 25 mg tablet, Take 1 and 1/2 tablets by mouth twice daily., Disp: 270 tablet, Rfl: 1    cholecalciferol (Vitamin D-3) 25 MCG (1000 UT) tablet, once every 24 hours., Disp: , Rfl:     dilTIAZem ER (Tiazac) 120 mg 24 hr capsule, Take 1 capsule (120 mg) by mouth once daily., Disp: , Rfl:     levothyroxine (Synthroid, Levoxyl) 112 mcg tablet, Take 1 tablet (112 mcg) by mouth early in the morning.. Take on an empty stomach at " the same time each day, either 30 to 60 minutes prior to breakfast, Disp: 90 tablet, Rfl: 1    lisinopril 20 mg tablet, 1 (one) time each day at the same time., Disp: 90 tablet, Rfl: 3    metFORMIN (Glucophage) 500 mg tablet, Take 1 tablet by mouth every 12 hours., Disp: 90 tablet, Rfl: 3    omeprazole (PriLOSEC) 40 mg DR capsule, Take 1 capsule (40 mg) by mouth once daily in the morning. Take before meals., Disp: 90 capsule, Rfl: 1    oxybutynin (Ditropan) 5 mg tablet, Take 1 tablet by mouth twice daily. (Patient taking differently: Take 1 tablet (5 mg) by mouth once daily.), Disp: 90 tablet, Rfl: 0    potassium chloride CR 20 mEq ER tablet, 1 (one) time each day at the same time., Disp: 90 tablet, Rfl: 1    rosuvastatin (Crestor) 20 mg tablet, 1 (one) time each day at the same time., Disp: 90 tablet, Rfl: 3    Trulicity 0.75 mg/0.5 mL pen injector, Inject 0.75 mg under the skin once weekly., Disp: 2 mL, Rfl: 11     Imaging:  CT lung screening low dose    Result Date: 6/19/2024  Interpreted By:  Lowell Bo, STUDY: CT LUNG SCREENING LOW DOSE;  6/18/2024 2:37 pm   INDICATION: Signs/Symptoms:screening.   COMPARISON: None.   ACCESSION NUMBER(S): [id number]   ORDERING CLINICIAN: VIVIAN MOMIN   TECHNIQUE: Helical data acquisition of the chest was obtained without IV contrast material.  Images were reformatted in axial, coronal, and sagittal planes.   FINDINGS: LUNGS AND AIRWAYS: The trachea and central airways are patent. No endobronchial lesion is seen.   There is mild subpleural emphysematous changes and reticulation consistent with smoking-related interstitial changes. There is no focal consolidation, pleural effusion, or pneumothorax.   There are no suspicious parenchymal lung nodules.   MEDIASTINUM AND SHELBY, LOWER NECK AND AXILLA: The visualized thyroid gland is within normal limits. Scattered mediastinal lymph nodes are felt to be reactive/inflammatory in nature. There are mildly prominent left  axillary lymph nodes. Esophagus appears within normal limits as seen.   HEART AND VESSELS: There is mild atherosclerotic changes of the thoracic aorta. Main pulmonary artery and its branches are normal in caliber. There is mild coronary artery calcifications. Estimated CT calcium score: 42 The cardiac chambers are not enlarged. There is no pericardial effusion seen.   UPPER ABDOMEN: The patient is status post cholecystectomy. There is moderate atherosclerotic calcifications of the abdominal aorta and its branches.       CHEST WALL AND OSSEOUS STRUCTURES: Chest wall is within normal limits. No acute osseous pathology.There are no suspicious osseous lesions.       1. There are no suspicious parenchymal lung nodules. Recommend continued 1 year low-dose chest CT for surveillance. 2. Smoking-related interstitial changes. 3. Left axillary lymph nodes most likely reactive/inflammatory in nature. Please correlate with physical exam and recent mammography. Close attention to these axillary lymph nodes can be made on follow-up low-dose chest CT.     LUNG RADS CATEGORY: Lung Rad: Lung-RADS 1, S (Negative)   Recommendation: Continue annual screening with Low Dose Chest CT in 12 months, recommended as per American College of Radiology Guidelines Lung-RADS Version 2022. Lung-RADS S (Other non-nodular findings) Management as appropriate to finding per American College of Radiology Guidelines Lung-RADS Version 2022.     MACRO: None   Signed by: Lowell Bo 6/19/2024 7:33 PM Dictation workstation:   [id number]       Labs reviewed:    Lab Results   Component Value Date    WBC 6.6 05/07/2024    HGB 12.3 05/07/2024    HCT 38.2 05/07/2024     05/07/2024    CHOL 92 05/07/2024    TRIG 202 (H) 05/07/2024    HDL 39.9 05/07/2024    ALT 17 05/07/2024    AST 17 05/07/2024     05/07/2024    K 4.5 05/07/2024     05/07/2024    CREATININE 1.21 (H) 05/07/2024    BUN 14 05/07/2024    CO2 26 05/07/2024    TSH 0.18 (L)  05/07/2024    HGBA1C 5.6 05/07/2024       Assessment/Plan   Problem List Items Addressed This Visit       Encounter to discuss test results     Reviewed lab/testing results with the patient and provided patient education regarding the results.           Iatrogenic hyperthyroidism - Primary     Thyroid medicine dose was decreased.  Will recheck TSH labs in 6-8 weeks.  Order on chart.         Type 2 diabetes mellitus without complication (Multi)     Follow up appointment scheduled.  HgbA1c was well-controlled.         Vitamin D insufficiency     Patient is on a supplement.  Will monitor.  Lab order on chart.         Relevant Orders    Vitamin D 25-Hydroxy,Total (for eval of Vitamin D levels)       Continue current medications as listed  Follow up in September after labs are redone.  Sooner if needed.  Time Spent  Prep time on day of patient encounter: 3 minutes  Time spent directly with patient, family or caregiver: 14 minutes  Documentation Time: 8 minutes

## 2024-07-05 DIAGNOSIS — N39.46 MIXED STRESS AND URGE URINARY INCONTINENCE: ICD-10-CM

## 2024-07-07 RX ORDER — OXYBUTYNIN CHLORIDE 5 MG/1
5 TABLET ORAL 2 TIMES DAILY
Qty: 90 TABLET | Refills: 0 | Status: SHIPPED | OUTPATIENT
Start: 2024-07-07

## 2024-07-30 DIAGNOSIS — E11.9 TYPE 2 DIABETES MELLITUS WITHOUT COMPLICATION, UNSPECIFIED WHETHER LONG TERM INSULIN USE (MULTI): ICD-10-CM

## 2024-07-30 RX ORDER — METFORMIN HYDROCHLORIDE 500 MG/1
500 TABLET ORAL 2 TIMES DAILY
Qty: 90 TABLET | Refills: 3 | Status: SHIPPED | OUTPATIENT
Start: 2024-07-30

## 2024-08-01 ENCOUNTER — APPOINTMENT (OUTPATIENT)
Dept: CARDIOLOGY | Facility: CLINIC | Age: 59
End: 2024-08-01
Payer: COMMERCIAL

## 2024-08-01 VITALS
HEART RATE: 74 BPM | BODY MASS INDEX: 43.89 KG/M2 | DIASTOLIC BLOOD PRESSURE: 78 MMHG | HEIGHT: 62 IN | SYSTOLIC BLOOD PRESSURE: 124 MMHG | WEIGHT: 238.5 LBS

## 2024-08-01 DIAGNOSIS — E66.01 MORBID OBESITY (MULTI): ICD-10-CM

## 2024-08-01 DIAGNOSIS — I47.29 PAROXYSMAL VENTRICULAR TACHYCARDIA (MULTI): Primary | ICD-10-CM

## 2024-08-01 DIAGNOSIS — E78.2 MIXED HYPERLIPIDEMIA: ICD-10-CM

## 2024-08-01 DIAGNOSIS — I10 PRIMARY HYPERTENSION: ICD-10-CM

## 2024-08-01 DIAGNOSIS — E11.9 TYPE 2 DIABETES MELLITUS WITHOUT COMPLICATION, WITHOUT LONG-TERM CURRENT USE OF INSULIN (MULTI): ICD-10-CM

## 2024-08-01 DIAGNOSIS — J44.9 CHRONIC OBSTRUCTIVE PULMONARY DISEASE, UNSPECIFIED COPD TYPE (MULTI): Chronic | ICD-10-CM

## 2024-08-01 DIAGNOSIS — E03.9 ACQUIRED HYPOTHYROIDISM: ICD-10-CM

## 2024-08-01 PROBLEM — E05.80 IATROGENIC HYPERTHYROIDISM: Status: RESOLVED | Noted: 2024-07-03 | Resolved: 2024-08-01

## 2024-08-01 PROBLEM — I49.9 CARDIAC ARRHYTHMIA, UNSPECIFIED: Status: RESOLVED | Noted: 2022-02-02 | Resolved: 2024-08-01

## 2024-08-01 PROBLEM — I49.1 ATRIAL PREMATURE DEPOLARIZATION: Status: RESOLVED | Noted: 2022-02-02 | Resolved: 2024-08-01

## 2024-08-01 PROCEDURE — 93000 ELECTROCARDIOGRAM COMPLETE: CPT | Performed by: INTERNAL MEDICINE

## 2024-08-01 NOTE — PROGRESS NOTES
"CARDIOLOGY OFFICE VISIT      CHIEF COMPLAINT  Chief Complaint   Patient presents with    Follow-up     Pt is here today following up after 1 year with AND      Chief complaint: \"I been trying to lose some weight.\"  HISTORY OF PRESENT ILLNESS  HPI  History: The patient is a 59-year-old -American female who is followed for ventricular tachycardia controlled with carvedilol and no documented clinical recurrence, hypertension, dyslipidemia on statin, and normal coronaries per left heart catheterization dated November 22, 2010 and Lexiscan stress test dated February 6, 2019 revealing heavy old genus reversibility/defects in the mid anterior wall to the apex rule out breast attenuation and mildly depressed left ventricular function per left heart catheterization and 2D echocardiogram dated February 3, 2014 revealing normal left ventricular function.  She states that she has been working to lose weight.  She states she has lost approximately 20 pounds.  Patient is on a combination of metformin and Trulicity for the management of diabetes mellitus type 2.  She denies palpitations, shortness of breath, abdominal distention, or lower extremity edema.  She states that she has had a pain that comes and goes in the middle of her chest and is unrelated to activity.  She believes it may be reflux or indigestion.  Past Medical History  Past Medical History:   Diagnosis Date    Arthritis     Depression     Diabetes mellitus (Multi)     GERD (gastroesophageal reflux disease)     Hyperlipidemia, unspecified     Dyslipidemia    Hypertension     Irritable bowel syndrome without diarrhea 10/14/2020    Irritable bowel syndrome without diarrhea    Mechanical loosening of other internal prosthetic joint, initial encounter ([facility]) 02/19/2020    Aseptic loosening of prosthetic knee    Personal history of colonic polyps 08/02/2017    History of colon polyps    Radiculopathy, cervical region 02/19/2020    Cervical radiculopathy, acute " "      Social History  Social History     Tobacco Use    Smoking status: Former     Current packs/day: 0.00     Types: Cigarettes     Quit date: 2020     Years since quittin.5    Smokeless tobacco: Never    Tobacco comments:     Very bad habit   Vaping Use    Vaping status: Never Used   Substance Use Topics    Alcohol use: Not Currently     Comment: Rarely drink alcohol    Drug use: Not Currently     Types: Marijuana     Comment: Stopped at 21       Family History     Family History   Problem Relation Name Age of Onset    Diabetes Mother Michelle Estes     Diabetes type II Mother Michelle Estes     Hypertension Mother Michelle Estes         Allergies:  Allergies   Allergen Reactions    Sulfa (Sulfonamide Antibiotics) Other     \"Get a fever and skin becomes painful\"    Penicillins Itching and Rash        Outpatient Medications:  Current Outpatient Medications   Medication Instructions    blood sugar diagnostic (True Metrix Glucose Test Strip) strip 1 strip, subdermal, 2 times daily    buPROPion XL (WELLBUTRIN XL) 300 mg, oral, Daily before breakfast    carvedilol (COREG) 37.5 mg, oral, 2 times daily    cholecalciferol (Vitamin D-3) 25 MCG (1000 UT) tablet Every 24 hours    dilTIAZem ER (TIAZAC) 120 mg, oral, Daily    levothyroxine (SYNTHROID, LEVOXYL) 112 mcg, oral, Daily, Take on an empty stomach at the same time each day, either 30 to 60 minutes prior to breakfast    lisinopril 20 mg tablet 1 (one) time each day at the same time.    metFORMIN (GLUCOPHAGE) 500 mg, oral, 2 times daily    omeprazole (PRILOSEC) 40 mg, oral, Daily before breakfast    oxybutynin (DITROPAN) 5 mg, oral, 2 times daily    potassium chloride CR 20 mEq ER tablet 1 (one) time each day at the same time.    rosuvastatin (Crestor) 20 mg tablet 1 (one) time each day at the same time.    Trulicity 0.75 mg/0.5 mL pen injector Inject 0.75 mg under the skin once weekly.          REVIEW OF SYSTEMS  Review of Systems   All other systems reviewed and are " negative.        VITALS  There were no vitals filed for this visit.    PHYSICAL EXAM  Vitals and nursing note reviewed.   Constitutional:       Appearance: Normal appearance.   HENT:      Head: Normocephalic.   Neck:      Vascular: No JVD. Carotid upstrokes II/IV.  Cardiovascular:      Rate and Rhythm: Normal rate and regular rhythm.      Pulses: Normal pulses.      Heart sounds: Normal S1 S2, no S3 S4.  No murmurs or rubs.  Pulmonary:      Effort: Pulmonary effort is normal. Respirations regular and nonlabored.     Breath sounds: Clear to auscultation posterior laterally.  Abdominal:      General: Bowel sounds are normal.      Palpations: Abdomen is soft.   Musculoskeletal:         General: Normal range of motion.      Cervical back: Normal range of motion.   Skin:     General: Skin is warm and dry.   Neurological:      General: No focal deficit present.      Mental Status: Alert and oriented to person, place, and time.      Motor: Motor function is intact.   Psychiatric:         Attention and Perception: Attention and perception normal.         Mood and Affect: Mood and affect normal.         Speech: Speech normal.         Behavior: Behavior normal. Behavior is cooperative.         Thought Content: Thought content normal.         Cognition and Memory: Cognition and memory normal.        Labs and testing: Twelve-lead EKG reveals sinus rhythm without ectopics and no acute ischemic changes.  QRS durations 88 ms,  ms, QTc 472 ms.    ASSESSMENT AND PLAN    Clinical impressions:  1. Ventricular tachycardia controlled on beta-blockade.  2. Normal left ventricular function per 2D echocardiogram dated October 1, 2020 with structurally normal heart.  3. Left heart catheterization dated February 27, 2019 revealing an ejection fraction of 60% with normal coronaries.  4. Dyslipidemia on statin.  5. Hypertension, controlled with a blood pressure today of 124/78.  6. Hypothyroidism on replacement therapy.  7. Diabetes  mellitus.  8. Morbid obesity with BMI 43.62.      Recommendations:  1.  Continue current medications as prescribed.  2.  Lifestyle modifications were discussed regarding weight management and blood pressure management.  We also discussed increasing water intake to 64 ounces per day and refraining from foods that are higher in sodium.  3.  I discussed with the patient follow-up with Dr. Camp in 6 months.  The patient request follow-up with myself due to concerns for regarding prolonged wait times.    Evaluation and note by Khushboo Vargas CNP  **Please excuse any errors in grammar or translation related to this dictation.  Voice recognition software was utilized to prepare this document.**

## 2024-08-15 ENCOUNTER — OFFICE VISIT (OUTPATIENT)
Dept: ORTHOPEDIC SURGERY | Facility: CLINIC | Age: 59
End: 2024-08-15
Payer: COMMERCIAL

## 2024-08-15 VITALS — HEIGHT: 62 IN | BODY MASS INDEX: 43.79 KG/M2 | WEIGHT: 238 LBS

## 2024-08-15 DIAGNOSIS — M19.049 CMC ARTHRITIS: Primary | ICD-10-CM

## 2024-08-15 PROCEDURE — 99213 OFFICE O/P EST LOW 20 MIN: CPT | Performed by: ORTHOPAEDIC SURGERY

## 2024-08-15 PROCEDURE — 3044F HG A1C LEVEL LT 7.0%: CPT | Performed by: ORTHOPAEDIC SURGERY

## 2024-08-15 PROCEDURE — 3008F BODY MASS INDEX DOCD: CPT | Performed by: ORTHOPAEDIC SURGERY

## 2024-08-15 PROCEDURE — 20600 DRAIN/INJ JOINT/BURSA W/O US: CPT | Mod: LT | Performed by: ORTHOPAEDIC SURGERY

## 2024-08-15 PROCEDURE — 4010F ACE/ARB THERAPY RXD/TAKEN: CPT | Performed by: ORTHOPAEDIC SURGERY

## 2024-08-15 PROCEDURE — 2500000005 HC RX 250 GENERAL PHARMACY W/O HCPCS: Performed by: ORTHOPAEDIC SURGERY

## 2024-08-15 PROCEDURE — 3048F LDL-C <100 MG/DL: CPT | Performed by: ORTHOPAEDIC SURGERY

## 2024-08-15 PROCEDURE — 2500000004 HC RX 250 GENERAL PHARMACY W/ HCPCS (ALT 636 FOR OP/ED): Performed by: ORTHOPAEDIC SURGERY

## 2024-08-15 PROCEDURE — 99214 OFFICE O/P EST MOD 30 MIN: CPT | Performed by: ORTHOPAEDIC SURGERY

## 2024-08-15 PROCEDURE — 20600 DRAIN/INJ JOINT/BURSA W/O US: CPT | Mod: RT | Performed by: ORTHOPAEDIC SURGERY

## 2024-08-15 PROCEDURE — 3061F NEG MICROALBUMINURIA REV: CPT | Performed by: ORTHOPAEDIC SURGERY

## 2024-08-15 PROCEDURE — 1036F TOBACCO NON-USER: CPT | Performed by: ORTHOPAEDIC SURGERY

## 2024-08-15 RX ORDER — LIDOCAINE HYDROCHLORIDE 10 MG/ML
0.5 INJECTION INFILTRATION; PERINEURAL
Status: COMPLETED | OUTPATIENT
Start: 2024-08-15 | End: 2024-08-15

## 2024-08-15 NOTE — PROGRESS NOTES
History present illness: Patient presents today for evaluation of bilateral thumb pain.  Previous steroid injections helped but now pain is severe again.  It is worse on the left as compared to the right.  She cannot member exactly when she started having more pain.  She does not know how long the injections worked.        Physical examination:  General: Alert and oriented to person, place, and time.  No acute distress and breathing comfortably: Pleasant and cooperative with examination.  Extremities: Evaluation of the bilateral upper extremities finds the patient had palpable radial artery at the wrists with brisk capillary refill to all digits.  Patient has intact sensation to axillary radial median and ulnar nerves.  There are no open wounds.  There are no signs of infection.  There is no evidence of lymphedema or lymphatic streaking.  The patient has supple compartments to bilateral arm forearm and hand.  Focal tenderness to bilateral thumb at CMC articulations.      Diagnostic studies:      Assessment: Bilateral thumb CMC arthritis      Plan: Treatment options were discussed.  We talked about operative and nonoperative strategies.  Patient elects for repeat trial of steroid injection to bilateral thumb CMC and for 4 weeks follow-up.  No x-rays upon return.          Hand / UE Inj/Asp: R thumb CMC for osteoarthritis on 8/15/2024 1:18 PM  Indications: pain  Details: 25 G needle, dorsal approach  Medications: 5 mg triamcinolone acetonide 10 mg/mL; 0.5 mL lidocaine 10 mg/mL (1 %)  Outcome: tolerated well, no immediate complications    Right Thumb Carpometacarpal Joint Injection: It was explained to the patient that the risks of a steroid injection include but are not limited to infection, local skin irritation, skin atrophy, calcification, continued pain and discomfort, elevated blood sugar, burning, failure to relieve pain, and possible late infection. The patient verbalized good insight and verbalized consent  for the injection. It was further explained that the postinjection discomfort can be alleviated with additional medications, ice, elevation, and rest over the first 24 hours, and that these modalities are recommended.    Using aseptic technique, a solution containing 0.5 cc of 5 mg of Kenalog and 0.5 mL of 1% lidocaine without epinephrine was injected intraarticularly to the right thumb carpometacarpal joint. Digital palpation was used to localize the CMC articulation about the dorsal radial aspect of the joint. Gentle traction was then imparted to the thumb distally and a 25-gauge needle was advanced through the skin, subcutaneous tissue and capsule. The injection was then administered and the patient tolerated the injection well. A band-aid was then placed. It should be noted that ethyl chloride spray was used to make the injection delivery more comfortable for the patient.  Procedure, treatment alternatives, risks and benefits explained, specific risks discussed. Consent was given by the patient. Immediately prior to procedure a time out was called to verify the correct patient, procedure, equipment, support staff and site/side marked as required. Patient was prepped and draped in the usual sterile fashion.       Hand / UE Inj/Asp: L thumb CMC for osteoarthritis on 8/15/2024 1:18 PM  Indications: pain  Details: 25 G needle, dorsal approach  Medications: 5 mg triamcinolone acetonide 10 mg/mL; 0.5 mL lidocaine 10 mg/mL (1 %)  Outcome: tolerated well, no immediate complications    Left Thumb Carpometacarpal Joint Injection: It was explained to the patient that the risks of a steroid injection include but are not limited to infection, local skin irritation, skin atrophy, calcification, continued pain and discomfort, elevated blood sugar, burning, failure to relieve pain, and possible late infection. The patient verbalized good insight and verbalized consent for the injection. It was further explained that the  postinjection discomfort can be alleviated with additional medications, ice, elevation, and rest over the first 24 hours, and that these modalities are recommended.    Using aseptic technique, a solution containing 0.5 cc of 5 mg of Kenalog and 0.5 mL of 1% lidocaine without epinephrine was injected intraarticularly to the left thumb carpometacarpal joint. Digital palpation was used to localize the CMC articulation about the dorsal radial aspect of the joint. Gentle traction was then imparted to the thumb distally and a 25-gauge needle was advanced through the skin, subcutaneous tissue and capsule. The injection was then administered and the patient tolerated the injection well. A band-aid was then placed. It should be noted that ethyl chloride spray was used to make the injection delivery more comfortable for the patient.  Procedure, treatment alternatives, risks and benefits explained, specific risks discussed. Consent was given by the patient. Immediately prior to procedure a time out was called to verify the correct patient, procedure, equipment, support staff and site/side marked as required. Patient was prepped and draped in the usual sterile fashion.

## 2024-08-16 ENCOUNTER — HOSPITAL ENCOUNTER (OUTPATIENT)
Facility: HOSPITAL | Age: 59
Setting detail: OUTPATIENT SURGERY
Discharge: HOME | End: 2024-08-16
Attending: OBSTETRICS & GYNECOLOGY | Admitting: OBSTETRICS & GYNECOLOGY
Payer: COMMERCIAL

## 2024-08-16 ENCOUNTER — ANESTHESIA EVENT (OUTPATIENT)
Dept: OPERATING ROOM | Facility: HOSPITAL | Age: 59
End: 2024-08-16
Payer: COMMERCIAL

## 2024-08-16 ENCOUNTER — ANESTHESIA (OUTPATIENT)
Dept: OPERATING ROOM | Facility: HOSPITAL | Age: 59
End: 2024-08-16
Payer: COMMERCIAL

## 2024-08-16 VITALS
HEART RATE: 78 BPM | OXYGEN SATURATION: 97 % | HEIGHT: 62 IN | SYSTOLIC BLOOD PRESSURE: 151 MMHG | RESPIRATION RATE: 18 BRPM | TEMPERATURE: 96.6 F | WEIGHT: 242.51 LBS | BODY MASS INDEX: 44.63 KG/M2 | DIASTOLIC BLOOD PRESSURE: 94 MMHG

## 2024-08-16 DIAGNOSIS — D06.9 HIGH GRADE SQUAMOUS INTRAEPITHELIAL LESION (HGSIL), GRADE 3 CIN, ON BIOPSY OF CERVIX: ICD-10-CM

## 2024-08-16 DIAGNOSIS — G89.18 POSTOPERATIVE PAIN: Primary | ICD-10-CM

## 2024-08-16 LAB
ABO GROUP (TYPE) IN BLOOD: NORMAL
ANION GAP SERPL CALC-SCNC: 11 MMOL/L (ref 10–20)
ANTIBODY SCREEN: NORMAL
BUN SERPL-MCNC: 19 MG/DL (ref 6–23)
CALCIUM SERPL-MCNC: 8.9 MG/DL (ref 8.6–10.3)
CHLORIDE SERPL-SCNC: 114 MMOL/L (ref 98–107)
CO2 SERPL-SCNC: 23 MMOL/L (ref 21–32)
CREAT SERPL-MCNC: 1.04 MG/DL (ref 0.5–1.05)
EGFRCR SERPLBLD CKD-EPI 2021: 62 ML/MIN/1.73M*2
ERYTHROCYTE [DISTWIDTH] IN BLOOD BY AUTOMATED COUNT: 13.3 % (ref 11.5–14.5)
GLUCOSE BLD MANUAL STRIP-MCNC: 131 MG/DL (ref 74–99)
GLUCOSE BLD MANUAL STRIP-MCNC: 146 MG/DL (ref 74–99)
GLUCOSE SERPL-MCNC: 138 MG/DL (ref 74–99)
HCT VFR BLD AUTO: 35.1 % (ref 36–46)
HGB BLD-MCNC: 11.5 G/DL (ref 12–16)
MCH RBC QN AUTO: 29.9 PG (ref 26–34)
MCHC RBC AUTO-ENTMCNC: 32.8 G/DL (ref 32–36)
MCV RBC AUTO: 91 FL (ref 80–100)
NRBC BLD-RTO: 0 /100 WBCS (ref 0–0)
PLATELET # BLD AUTO: 187 X10*3/UL (ref 150–450)
POTASSIUM SERPL-SCNC: 4.3 MMOL/L (ref 3.5–5.3)
RBC # BLD AUTO: 3.84 X10*6/UL (ref 4–5.2)
RH FACTOR (ANTIGEN D): NORMAL
SODIUM SERPL-SCNC: 144 MMOL/L (ref 136–145)
WBC # BLD AUTO: 8.7 X10*3/UL (ref 4.4–11.3)

## 2024-08-16 PROCEDURE — 2500000004 HC RX 250 GENERAL PHARMACY W/ HCPCS (ALT 636 FOR OP/ED): Performed by: OBSTETRICS & GYNECOLOGY

## 2024-08-16 PROCEDURE — 2500000005 HC RX 250 GENERAL PHARMACY W/O HCPCS: Performed by: STUDENT IN AN ORGANIZED HEALTH CARE EDUCATION/TRAINING PROGRAM

## 2024-08-16 PROCEDURE — 80048 BASIC METABOLIC PNL TOTAL CA: CPT | Performed by: OBSTETRICS & GYNECOLOGY

## 2024-08-16 PROCEDURE — 36415 COLL VENOUS BLD VENIPUNCTURE: CPT | Performed by: OBSTETRICS & GYNECOLOGY

## 2024-08-16 PROCEDURE — 7100000002 HC RECOVERY ROOM TIME - EACH INCREMENTAL 1 MINUTE: Performed by: OBSTETRICS & GYNECOLOGY

## 2024-08-16 PROCEDURE — 2500000001 HC RX 250 WO HCPCS SELF ADMINISTERED DRUGS (ALT 637 FOR MEDICARE OP): Performed by: OBSTETRICS & GYNECOLOGY

## 2024-08-16 PROCEDURE — 2500000002 HC RX 250 W HCPCS SELF ADMINISTERED DRUGS (ALT 637 FOR MEDICARE OP, ALT 636 FOR OP/ED): Performed by: OBSTETRICS & GYNECOLOGY

## 2024-08-16 PROCEDURE — 82947 ASSAY GLUCOSE BLOOD QUANT: CPT

## 2024-08-16 PROCEDURE — 57520 CONIZATION OF CERVIX: CPT | Performed by: OBSTETRICS & GYNECOLOGY

## 2024-08-16 PROCEDURE — 7100000009 HC PHASE TWO TIME - INITIAL BASE CHARGE: Performed by: OBSTETRICS & GYNECOLOGY

## 2024-08-16 PROCEDURE — 7100000010 HC PHASE TWO TIME - EACH INCREMENTAL 1 MINUTE: Performed by: OBSTETRICS & GYNECOLOGY

## 2024-08-16 PROCEDURE — 86901 BLOOD TYPING SEROLOGIC RH(D): CPT | Performed by: OBSTETRICS & GYNECOLOGY

## 2024-08-16 PROCEDURE — 7100000001 HC RECOVERY ROOM TIME - INITIAL BASE CHARGE: Performed by: OBSTETRICS & GYNECOLOGY

## 2024-08-16 PROCEDURE — 3700000002 HC GENERAL ANESTHESIA TIME - EACH INCREMENTAL 1 MINUTE: Performed by: OBSTETRICS & GYNECOLOGY

## 2024-08-16 PROCEDURE — 2500000004 HC RX 250 GENERAL PHARMACY W/ HCPCS (ALT 636 FOR OP/ED): Performed by: STUDENT IN AN ORGANIZED HEALTH CARE EDUCATION/TRAINING PROGRAM

## 2024-08-16 PROCEDURE — 2500000005 HC RX 250 GENERAL PHARMACY W/O HCPCS: Performed by: OBSTETRICS & GYNECOLOGY

## 2024-08-16 PROCEDURE — 85027 COMPLETE CBC AUTOMATED: CPT | Performed by: OBSTETRICS & GYNECOLOGY

## 2024-08-16 PROCEDURE — 3700000001 HC GENERAL ANESTHESIA TIME - INITIAL BASE CHARGE: Performed by: OBSTETRICS & GYNECOLOGY

## 2024-08-16 PROCEDURE — 2720000007 HC OR 272 NO HCPCS: Performed by: OBSTETRICS & GYNECOLOGY

## 2024-08-16 PROCEDURE — 3600000003 HC OR TIME - INITIAL BASE CHARGE - PROCEDURE LEVEL THREE: Performed by: OBSTETRICS & GYNECOLOGY

## 2024-08-16 PROCEDURE — 88307 TISSUE EXAM BY PATHOLOGIST: CPT | Mod: TC,ELYLAB,WESLAB | Performed by: OBSTETRICS & GYNECOLOGY

## 2024-08-16 PROCEDURE — 3600000008 HC OR TIME - EACH INCREMENTAL 1 MINUTE - PROCEDURE LEVEL THREE: Performed by: OBSTETRICS & GYNECOLOGY

## 2024-08-16 RX ORDER — LABETALOL HYDROCHLORIDE 5 MG/ML
5 INJECTION, SOLUTION INTRAVENOUS ONCE AS NEEDED
Status: DISCONTINUED | OUTPATIENT
Start: 2024-08-16 | End: 2024-08-16 | Stop reason: HOSPADM

## 2024-08-16 RX ORDER — ONDANSETRON HYDROCHLORIDE 2 MG/ML
4 INJECTION, SOLUTION INTRAVENOUS ONCE AS NEEDED
Status: DISCONTINUED | OUTPATIENT
Start: 2024-08-16 | End: 2024-08-16 | Stop reason: HOSPADM

## 2024-08-16 RX ORDER — LIDOCAINE HYDROCHLORIDE 20 MG/ML
INJECTION, SOLUTION INFILTRATION; PERINEURAL AS NEEDED
Status: DISCONTINUED | OUTPATIENT
Start: 2024-08-16 | End: 2024-08-16

## 2024-08-16 RX ORDER — ONDANSETRON HYDROCHLORIDE 2 MG/ML
INJECTION, SOLUTION INTRAVENOUS AS NEEDED
Status: DISCONTINUED | OUTPATIENT
Start: 2024-08-16 | End: 2024-08-16

## 2024-08-16 RX ORDER — SODIUM CHLORIDE, SODIUM LACTATE, POTASSIUM CHLORIDE, CALCIUM CHLORIDE 600; 310; 30; 20 MG/100ML; MG/100ML; MG/100ML; MG/100ML
100 INJECTION, SOLUTION INTRAVENOUS CONTINUOUS
Status: DISCONTINUED | OUTPATIENT
Start: 2024-08-16 | End: 2024-08-16 | Stop reason: HOSPADM

## 2024-08-16 RX ORDER — FENTANYL CITRATE 50 UG/ML
25 INJECTION, SOLUTION INTRAMUSCULAR; INTRAVENOUS EVERY 5 MIN PRN
Status: DISCONTINUED | OUTPATIENT
Start: 2024-08-16 | End: 2024-08-16 | Stop reason: HOSPADM

## 2024-08-16 RX ORDER — FENTANYL CITRATE 50 UG/ML
INJECTION, SOLUTION INTRAMUSCULAR; INTRAVENOUS AS NEEDED
Status: DISCONTINUED | OUTPATIENT
Start: 2024-08-16 | End: 2024-08-16

## 2024-08-16 RX ORDER — PROPOFOL 10 MG/ML
INJECTION, EMULSION INTRAVENOUS AS NEEDED
Status: DISCONTINUED | OUTPATIENT
Start: 2024-08-16 | End: 2024-08-16

## 2024-08-16 RX ORDER — NAPROXEN 500 MG/1
500 TABLET ORAL 2 TIMES DAILY
Qty: 6 TABLET | Refills: 0 | Status: SHIPPED | OUTPATIENT
Start: 2024-08-16 | End: 2024-08-19

## 2024-08-16 RX ORDER — TRANEXAMIC ACID 650 MG/1
1300 TABLET ORAL ONCE
Status: COMPLETED | OUTPATIENT
Start: 2024-08-16 | End: 2024-08-16

## 2024-08-16 RX ORDER — TRAMADOL HYDROCHLORIDE 50 MG/1
50 TABLET ORAL EVERY 8 HOURS PRN
Qty: 9 TABLET | Refills: 0 | Status: SHIPPED | OUTPATIENT
Start: 2024-08-16 | End: 2024-08-19

## 2024-08-16 RX ORDER — MIDAZOLAM HYDROCHLORIDE 1 MG/ML
INJECTION, SOLUTION INTRAMUSCULAR; INTRAVENOUS AS NEEDED
Status: DISCONTINUED | OUTPATIENT
Start: 2024-08-16 | End: 2024-08-16

## 2024-08-16 RX ORDER — BUPIVACAINE HCL/EPINEPHRINE 0.25-.0005
VIAL (ML) INJECTION AS NEEDED
Status: DISCONTINUED | OUTPATIENT
Start: 2024-08-16 | End: 2024-08-16 | Stop reason: HOSPADM

## 2024-08-16 RX ORDER — OXYCODONE HYDROCHLORIDE 5 MG/1
5 TABLET ORAL EVERY 4 HOURS PRN
Status: CANCELLED | OUTPATIENT
Start: 2024-08-16

## 2024-08-16 SDOH — HEALTH STABILITY: MENTAL HEALTH: CURRENT SMOKER: 0

## 2024-08-16 ASSESSMENT — PAIN - FUNCTIONAL ASSESSMENT
PAIN_FUNCTIONAL_ASSESSMENT: 0-10

## 2024-08-16 ASSESSMENT — PAIN SCALES - GENERAL
PAINLEVEL_OUTOF10: 2
PAINLEVEL_OUTOF10: 0 - NO PAIN
PAINLEVEL_OUTOF10: 1
PAINLEVEL_OUTOF10: 3
PAINLEVEL_OUTOF10: 0 - NO PAIN

## 2024-08-16 ASSESSMENT — PAIN DESCRIPTION - DESCRIPTORS
DESCRIPTORS: CRAMPING
DESCRIPTORS: CRAMPING

## 2024-08-16 NOTE — ANESTHESIA PROCEDURE NOTES
Airway  Date/Time: 8/16/2024 8:47 AM  Urgency: elective      Staffing  Performed: attending   Authorized by: Clinton Prado MD    Performed by: Willam Ruth MD  Patient location during procedure: OR    Indications and Patient Condition  Indications for airway management: anesthesia  Spontaneous Ventilation: absent  Sedation level: deep  Preoxygenated: yes  Patient position: sniffing  Mask difficulty assessment: 1 - vent by mask    Final Airway Details  Final airway type: supraglottic airway      Successful airway: classic  Size 4     Number of attempts at approach: 1

## 2024-08-16 NOTE — DISCHARGE INSTRUCTIONS
General Anesthesia Discharge Instructions    About this topic  You may need general anesthesia if you need to be asleep during a procedure. Your doctor will use drugs to block the signals that go from your nerves to your brain. Doctors give general anesthesia during a surgery or procedure to:  Allow you to sleep  Help your body be still  Relax your muscles  Help you to relax and be pain free  Keep you from remembering the surgery  Let the doctor manage your airway, breathing, and blood flow  The doctor or nurse anesthetist gives general anesthesia by a shot into your vein. Sometimes, you may breathe in a gas through a mask placed over your face.  What care is needed at home?  Ask your doctor what you need to do when you go home. Make sure you ask questions if you do not understand what the doctor says.  Your doctor may give you drugs to prevent or treat an upset stomach from the anesthetic. Take them as ordered.  If your throat is sore, suck on ice chips or popsicles to ease throat pain.  Put 2 to 3 pillows under your head and back when you lie down to help you breathe easier.  For the first 24 to 48 hours:  Do not operate heavy or dangerous machinery.  Do not make major decisions or sign important papers. You may not be able to think clearly.  Avoid beer, wine, or mixed drinks.  You are at a higher risk of falling for at least 24 hours after general anesthesia.  Take extra care when you get up.  Do not change positions quickly.  Do not rush when you need to go to the bathroom or to answer the phone.  Ask for help if you feel unsteady when you try to walk.  Wear shoes with non-slip soles and low heels.  What follow-up care is needed?  Your doctor may ask you to come back to the office to check on your progress. Be sure to keep these visits.  If you have stitches that do not dissolve or staples, you will need to have them removed. Your doctor will want to do this in 1 to 2 weeks. If the doctor used skin glue, the  glue will fall off on its own.  What drugs may be needed?  The doctor may order drugs to:  Help with pain  Treat an upset stomach or throwing up  Will physical activity be limited?  You will not be allowed to drive right away after the procedure. Ask a family member or a friend to drive you home.  Avoid trying to get out of bed without help until you are sure of your balance.  You may have to limit your activity. Talk to your doctor about if you need to limit how much you lift or limit exercise after your procedure.  What changes to diet are needed?  Start with a light diet when you are fully awake. This includes things that are easy to swallow like soups, pudding, jello, toast, and eggs. Slowly progress to your normal diet.  What problems could happen?  Low blood pressure  Breathing problems  Upset stomach or throwing up  Dizziness  Blood clots  Infection  When do I need to call the doctor?  Trouble breathing  Upset stomach or throwing up more than 3 times in the next 2 days  Dizziness  Teach Back: Helping You Understand  The Teach Back Method helps you understand the information we are giving you. After you talk with the staff, tell them in your own words what you learned. This helps to make sure the staff has described each thing clearly. It also helps to explain things that may have been confusing. Before going home, make sure you can do these:  I can tell you about my procedure.  I can tell you if I need to follow up with my doctor.  I can tell you what is good for me to eat and drink the next day.  I can tell you what I would do if I have trouble breathing, an upset stomach, or dizziness.  Where can I learn more?  [employer] [place] [employer]  https://www.nigms.nih.gov/education/pages/factsheet_Anesthesia.aspx  [facility] Choices  http://www.nhs.uk/conditions/Anaesthetic-general/Pages/Definition.aspx  Last Reviewed Date  2020-04-22

## 2024-08-16 NOTE — OP NOTE
Date: 2024  OR Location: [facility]    Name: Joselin Dunn, : 1965, Age: 59 y.o., MRN: [id number], Sex: female    Diagnosis  * No surgery found * Post-op Diagnosis     * High grade squamous intraepithelial lesion (HGSIL), grade 3 ARIELA, on biopsy of cervix [D06.9]     Procedures  Cold knife conization of cervix  10424 - SC CONIZATION CERVIX W/WO D&C RPR KNIFE/LASER        Surgeons      * Jomar Motta - Primary    Resident/Fellow/Other Assistant:  Surgeons and Role:     * Annemarie Rangel PA-C - Assisting    Procedure Summary  Anesthesia: General  ASA: III  Anesthesia Staff: Anesthesiologist: Willam Ruth MD    Estimated Blood Loss: < 50 mL     Findings:  flush grossly normal cervix, enterocele    Specimens:  cervical cone with suture at 12:00    Procedure:    DETAILS:  Patient presented with HGSIL on ECC on colposcopic biopsy.  Discussed management including and recommend cold knife conization in OR--patient agreeable.  Risks benefits alternatives discussed with the patient. Risks including bleeding, infection, incomplete resection of ARIELA with potential need for future surgery, injury to adjacent tissues, or anesthesia complications.    PROCEDURE:  Patient was taken the operating room after informed consent was obtained to place in supine position. General anesthesia was started without complication. She was placed in lithotomy position in yellowfin stirrups and exam under anesthesia was performed.  She is prepped and draped in normal sterile fashion using Betadine and ChloraPrep. A timeout was performed.  Two retractors were placed in the vagina anteriorly and posteriorly.  Two retention sutures of Vicryl were placed laterally on the cervix for retraction and for additional postop hemostasis purposes.  The anterior lip of the cervix was grasped with a single-tooth tenaculum.  A paracervical block of dilute pitressin and marcaine with epinephrine was placed.  Lugol's iodine was placed on the cervix to  identify the transformation zone.  A scalpel was used to circumferentially excise a conical specimen of tissue, encompassing the entire transformation zone and visible ARIELA.  The 12:00 position of the specimen was tagged with a suture.  An endocervical curettage was then obtained.  The base and edges of the cone bed were coagulated with a rollerball for hemostasis.  Monsel's solution was placed into the cone bed for additional postop hemostasis.  The lateral retention sutures were then tied together in the midline to close the cone bed  The cervical cone bed was hemostatic. The patient tolerated the procedure well.  A small area of bleeding from an abrasion was noted at the posterior fourchette from use of the vaginal retractor.  This was sutured with an interrupted suture of 4-0 Monocryl.  Sponge, lap, needle, and instrument counts were reported as correct x 2. She was transferred to the PACU in stable condition.    PA served as the first surgical assistant for this surgery as there are no residents at this [facility].  The PA's assistance in the case included assisting with patient positioning, patient prepping and draping, assisting the surgeon with the necessary additional vaginal retractors for this challenging conization surgery, assisting the surgeon with closure.  PA's assistance was needed in addition to the scrub tech due to challenging patient anatomy.      Jomar Motta DO

## 2024-08-16 NOTE — ANESTHESIA POSTPROCEDURE EVALUATION
Patient: Joselin Dunn    Procedure Summary       Date: 08/16/24 Room / Location: [facility] 08 / [facility]    Anesthesia Start: 0839 Anesthesia Stop: 0941    Procedure: Cold knife conization of cervix (Bilateral) Diagnosis:       High grade squamous intraepithelial lesion (HGSIL), grade 3 ARIELA, on biopsy of cervix      (High grade squamous intraepithelial lesion (HGSIL), grade 3 ARIELA, on biopsy of cervix [D06.9])    Surgeons: Jomar Motta DO Responsible Provider: Willam Ruth MD    Anesthesia Type: MAC ASA Status: 3            Anesthesia Type: MAC    Vitals Value Taken Time   /78 08/16/24 0942   Temp 36.1 08/16/24 0942   Pulse 86 08/16/24 0942   Resp 12 08/16/24 0942   SpO2 96 % 08/16/24 0941   Vitals shown include unfiled device data.    Anesthesia Post Evaluation    Patient location during evaluation: PACU  Patient participation: complete - patient participated  Level of consciousness: awake and alert  Pain management: adequate  Multimodal analgesia pain management approach  Airway patency: patent  Cardiovascular status: acceptable  Respiratory status: acceptable  Hydration status: acceptable  Postoperative Nausea and Vomiting: none        No notable events documented.

## 2024-08-16 NOTE — H&P
History Of Present Illness  Joselin Dunn is a 59 y.o. female presenting with High grade squamous intraepithelial lesion (HGSIL), grade 3 ARIELA, on biopsy of cervix [D06.9]Pre-op Diagnosis      * High grade squamous intraepithelial lesion (HGSIL), grade 3 ARIELA, on biopsy of cervix [D06.9]     Past Medical History  Past Medical History:   Diagnosis Date    Angina pectoris (CMS-HCC)     Arthritis     CPAP (continuous positive airway pressure) dependence     Depression     Diabetes mellitus (Multi)     GERD (gastroesophageal reflux disease)     HPV (human papilloma virus) infection     Hyperlipidemia, unspecified     Dyslipidemia    Hypertension     Hypothyroidism     Mechanical loosening of other internal prosthetic joint, initial encounter (CMS-HCC) 02/19/2020    Aseptic loosening of prosthetic knee    Personal history of colonic polyps 08/02/2017    History of colon polyps    Radiculopathy, cervical region 02/19/2020    Cervical radiculopathy, acute    Sleep apnea     Stress incontinence     Type 2 diabetes mellitus (Multi)        Surgical History  Past Surgical History:   Procedure Laterality Date    ANKLE SURGERY  10/29/2021    Ankle Surgery    CATARACT EXTRACTION      CHOLECYSTECTOMY  02/25/2016    Cholecystectomy    COLONOSCOPY W/ POLYPECTOMY  2022    COLPOSCOPY  05/2023    [facility]    HERNIA REPAIR      umbilical    SHOULDER SURGERY      TOTAL KNEE ARTHROPLASTY Bilateral 02/25/2016    Knee Replacement    TUBAL LIGATION          Social History  She reports that she quit smoking about 4 years ago. Her smoking use included cigarettes. She has never used smokeless tobacco. She reports that she does not currently use alcohol. She reports that she does not currently use drugs after having used the following drugs: Marijuana.    Family History  Family History   Problem Relation Name Age of Onset    Diabetes Mother Michelle Estes     Diabetes type II Mother Michelle Estes     Hypertension Mother Michelle Estes         Allergies  Sulfa  "(sulfonamide antibiotics) and Penicillins    Review of systems   12 point review of systems was performed and noncontributory    Physical exam  General: Appears stated age, no acute distress   Head: NCAT  Skin: Not diaphoretic, no flushing,   Eye: PERRL, EOMI   Respiratory: No respiratory distress or shortness of breath   Musculoskeletal:  BLE and BUE movement intact   Neuro: normal speech, no gait abnormalities noted  Psych: normal affect     Last Recorded Vitals  Blood pressure 175/89, pulse 89, temperature 36.3 °C (97.3 °F), temperature source Temporal, resp. rate 18, height 1.575 m (5' 2\"), weight 110 kg (242 lb 8.1 oz), SpO2 96%.    Relevant Results           Assessment/Plan       NH CONIZATION CERVIX W/WO D&C RPR KNIFE/LASER [88511] (Cold knife conization of cervix)         Jomar Motta, DO    "

## 2024-08-16 NOTE — ANESTHESIA PREPROCEDURE EVALUATION
"Joselin Dunn is a 59 y.o. female here for:    Cold knife conization of cervix  With Jomar Motta DO  Pre-Op Diagnosis Codes:      * High grade squamous intraepithelial lesion (HGSIL), grade 3 ARIELA, on biopsy of cervix [D06.9]    Relevant Problems   Cardiac   (+) Hyperlipidemia   (+) Paroxysmal ventricular tachycardia (Multi)   (+) Primary hypertension      Pulmonary   (+) Chronic obstructive pulmonary disease (Multi)      Endocrine   (+) Acquired hypothyroidism   (+) Morbid obesity (Multi)   (+) Type 2 diabetes mellitus without complication (Multi)      Circulatory   (+) Cardiomyopathy (Multi)      Genitourinary and Reproductive   (+) High grade squamous intraepithelial lesion (HGSIL), grade 3 ARIELA, on biopsy of cervix       Lab Results   Component Value Date    HGB 11.5 (L) 2024    HCT 35.1 (L) 2024    WBC 8.7 2024     2024     2024    K 4.5 2024     2024    CREATININE 1.21 (H) 2024    BUN 14 2024     TTE :  CONCLUSIONS:      1. Normal Echocardiogram.   2. The left ventricular systolic function is normal with a 60% estimated ejection fraction.   3. There is no evidence of left ventricular hypertrophy.   4. Normal Chamber Sizes.   5. No significant VHD.   6. No Change compared to study of .    Social History     Tobacco Use   Smoking Status Former    Current packs/day: 0.00    Types: Cigarettes    Quit date: 2020    Years since quittin.5   Smokeless Tobacco Never       Allergies   Allergen Reactions    Sulfa (Sulfonamide Antibiotics) Other     \"Get a fever and skin becomes painful\"    Penicillins Itching and Rash       Current Outpatient Medications   Medication Instructions    blood sugar diagnostic (True Metrix Glucose Test Strip) strip 1 strip, subdermal, 2 times daily    buPROPion XL (WELLBUTRIN XL) 300 mg, oral, Daily before breakfast    carvedilol (COREG) 37.5 mg, oral, 2 times daily    cholecalciferol (Vitamin D-3) " 25 MCG (1000 UT) tablet Every 24 hours    dilTIAZem ER (TIAZAC) 120 mg, oral, Daily    levothyroxine (SYNTHROID, LEVOXYL) 112 mcg, oral, Daily, Take on an empty stomach at the same time each day, either 30 to 60 minutes prior to breakfast    lisinopril 20 mg tablet 1 (one) time each day at the same time.    metFORMIN (GLUCOPHAGE) 500 mg, oral, 2 times daily    naproxen (NAPROSYN) 500 mg, oral, 2 times daily    omeprazole (PRILOSEC) 40 mg, oral, Daily before breakfast    oxybutynin (DITROPAN) 5 mg, oral, 2 times daily    potassium chloride CR 20 mEq ER tablet 1 (one) time each day at the same time.    rosuvastatin (Crestor) 20 mg tablet 1 (one) time each day at the same time.    traMADol (ULTRAM) 50 mg, oral, Every 8 hours PRN    Trulicity 0.75 mg/0.5 mL pen injector Inject 0.75 mg under the skin once weekly.       Past Surgical History:   Procedure Laterality Date    ANKLE SURGERY  10/29/2021    Ankle Surgery    CATARACT EXTRACTION      CHOLECYSTECTOMY  02/25/2016    Cholecystectomy    COLONOSCOPY W/ POLYPECTOMY  2022    COLPOSCOPY  05/2023    [facility]    HERNIA REPAIR      umbilical    SHOULDER SURGERY      TOTAL KNEE ARTHROPLASTY Bilateral 02/25/2016    Knee Replacement    TUBAL LIGATION         Family History   Problem Relation Name Age of Onset    Diabetes Mother Michelle Estes     Diabetes type II Mother Michelle Estes     Hypertension Mother Michelle Estes        NPO Details:  NPO/Void Status  Carbohydrate Drink Given Prior to Surgery? : N  Date of Last Liquid: 08/15/24  Time of Last Liquid: 2300  Date of Last Solid: 08/15/24  Time of Last Solid: 2200  Last Intake Type: Clear fluids  Time of Last Void: 0739        Physical Exam    Airway  Mallampati: III  TM distance: >3 FB     Cardiovascular    Dental   (+) upper dentures     Pulmonary    Abdominal            Anesthesia Plan    History of general anesthesia?: yes  History of complications of general anesthesia?: no    ASA 3     MAC     The patient is not a current  smoker.    intravenous induction   Anesthetic plan and risks discussed with patient.

## 2024-08-23 LAB
LABORATORY COMMENT REPORT: NORMAL
PATH REPORT.COMMENTS IMP SPEC: NORMAL
PATH REPORT.FINAL DX SPEC: NORMAL
PATH REPORT.GROSS SPEC: NORMAL
PATH REPORT.RELEVANT HX SPEC: NORMAL
PATH REPORT.TOTAL CANCER: NORMAL

## 2024-08-28 ASSESSMENT — ENCOUNTER SYMPTOMS
POLYDIPSIA: 0
HUNGER: 0
SEIZURES: 0
SPEECH DIFFICULTY: 0
BLACKOUTS: 0
WEAKNESS: 0
BLURRED VISION: 0
WEIGHT LOSS: 1
SWEATS: 0
VISUAL CHANGE: 0
CONFUSION: 0
DIZZINESS: 0
TREMORS: 0
FATIGUE: 0
POLYPHAGIA: 0
NERVOUS/ANXIOUS: 0
HEADACHES: 0

## 2024-09-03 ENCOUNTER — LAB (OUTPATIENT)
Dept: LAB | Facility: LAB | Age: 59
End: 2024-09-03
Payer: COMMERCIAL

## 2024-09-03 ENCOUNTER — APPOINTMENT (OUTPATIENT)
Dept: OBSTETRICS AND GYNECOLOGY | Facility: CLINIC | Age: 59
End: 2024-09-03
Payer: COMMERCIAL

## 2024-09-03 VITALS
BODY MASS INDEX: 44.22 KG/M2 | DIASTOLIC BLOOD PRESSURE: 80 MMHG | WEIGHT: 240.3 LBS | SYSTOLIC BLOOD PRESSURE: 126 MMHG | HEIGHT: 62 IN

## 2024-09-03 DIAGNOSIS — E11.9 TYPE 2 DIABETES MELLITUS WITHOUT COMPLICATION, UNSPECIFIED WHETHER LONG TERM INSULIN USE (MULTI): ICD-10-CM

## 2024-09-03 DIAGNOSIS — N39.46 MIXED STRESS AND URGE URINARY INCONTINENCE: ICD-10-CM

## 2024-09-03 DIAGNOSIS — D06.9 HIGH GRADE SQUAMOUS INTRAEPITHELIAL LESION (HGSIL), GRADE 3 CIN, ON BIOPSY OF CERVIX: Primary | ICD-10-CM

## 2024-09-03 DIAGNOSIS — E05.80 IATROGENIC HYPERTHYROIDISM: ICD-10-CM

## 2024-09-03 DIAGNOSIS — E55.9 VITAMIN D INSUFFICIENCY: ICD-10-CM

## 2024-09-03 DIAGNOSIS — Z09 POSTOP CHECK: ICD-10-CM

## 2024-09-03 PROBLEM — R87.810 HUMAN PAPILLOMAVIRUS (HPV) TYPE 16 DNA DETECTED IN CERVICAL SPECIMEN: Status: RESOLVED | Noted: 2024-05-08 | Resolved: 2024-09-03

## 2024-09-03 LAB
25(OH)D3 SERPL-MCNC: 24 NG/ML (ref 30–100)
CREAT UR-MCNC: 230.2 MG/DL (ref 20–320)
MICROALBUMIN UR-MCNC: 45.9 MG/L
MICROALBUMIN/CREAT UR: 19.9 UG/MG CREAT
TSH SERPL-ACNC: 0.69 MIU/L (ref 0.44–3.98)

## 2024-09-03 PROCEDURE — 99024 POSTOP FOLLOW-UP VISIT: CPT | Performed by: OBSTETRICS & GYNECOLOGY

## 2024-09-03 PROCEDURE — 3044F HG A1C LEVEL LT 7.0%: CPT | Performed by: OBSTETRICS & GYNECOLOGY

## 2024-09-03 PROCEDURE — 82570 ASSAY OF URINE CREATININE: CPT

## 2024-09-03 PROCEDURE — 3079F DIAST BP 80-89 MM HG: CPT | Performed by: OBSTETRICS & GYNECOLOGY

## 2024-09-03 PROCEDURE — 3061F NEG MICROALBUMINURIA REV: CPT | Performed by: OBSTETRICS & GYNECOLOGY

## 2024-09-03 PROCEDURE — 3074F SYST BP LT 130 MM HG: CPT | Performed by: OBSTETRICS & GYNECOLOGY

## 2024-09-03 PROCEDURE — 84443 ASSAY THYROID STIM HORMONE: CPT

## 2024-09-03 PROCEDURE — 3008F BODY MASS INDEX DOCD: CPT | Performed by: OBSTETRICS & GYNECOLOGY

## 2024-09-03 PROCEDURE — 4010F ACE/ARB THERAPY RXD/TAKEN: CPT | Performed by: OBSTETRICS & GYNECOLOGY

## 2024-09-03 PROCEDURE — 3048F LDL-C <100 MG/DL: CPT | Performed by: OBSTETRICS & GYNECOLOGY

## 2024-09-03 PROCEDURE — 82306 VITAMIN D 25 HYDROXY: CPT

## 2024-09-03 PROCEDURE — 1036F TOBACCO NON-USER: CPT | Performed by: OBSTETRICS & GYNECOLOGY

## 2024-09-03 PROCEDURE — 82043 UR ALBUMIN QUANTITATIVE: CPT

## 2024-09-03 RX ORDER — OXYBUTYNIN CHLORIDE 5 MG/1
5 TABLET ORAL 2 TIMES DAILY
Qty: 90 TABLET | Refills: 0 | Status: SHIPPED | OUTPATIENT
Start: 2024-09-03

## 2024-09-03 NOTE — PROGRESS NOTES
"GYNECOLOGY PROGRESS NOTE          CC:     Chief Complaint   Patient presents with    Post-op     Est patient - Post op for 8/16 cone biopsy - patient mentioned that she has a discharge that was brown to gray color. NO pain just some mild discomfort.  Chaperone betty schultz ma        HPI:  Joselin Dunn is here for postop check.  Did well postop, no issues.  Pain controlled, no fevers, normal voids and BMs.   Still has some discolored discharge and some mild discomfort but no real pain.  No new GYN complaints.      ROS:  GEN - no fevers   GYN - see HPI      PHYSICAL EXAM:  /80 (BP Location: Left arm, Patient Position: Sitting)   Ht 1.575 m (5' 2\")   Wt 109 kg (240 lb 4.8 oz)   LMP  (LMP Unknown)   BMI 43.95 kg/m²   GEN:  A&O, NAD  ABD  NT/ND, soft, no palpable masses  PELVIC:  CKC surgical site healing well, no foul discharge or bleeding from site--normal expected post-CKC charred colored discharge present  PSYCH:  normal affect, non-anxious      IMPRESSION/PLAN:    Problem List Items Addressed This Visit       High grade squamous intraepithelial lesion (HGSIL), grade 3 ARIELA, on biopsy of cervix - Primary     Other Visit Diagnoses       Postop check              Postop check:  Doing well postoperatively, released from postoperative care.      Intraoperative events and findings reviewed with patient. Results of CKC (1x1x1 cm specimen) pathology--benign/no residual dysplasia--reviewed with the patient.  Pap=normal/HPV16+, COLPO=focal HGSIL on ECC.  Offered surveillance versus definitive hysterectomy given negative CKC pathology, patient opts for surveillance.  Repeat Pap and HPV testing in 6 months planned--if still abnormal (HPV 16+) will recommend hysterectomy.      Jomar Motta, DO  "

## 2024-09-04 NOTE — RESULT ENCOUNTER NOTE
Please let the patient know that her vitamin D level is still too low.  If she is on a vitamin D supplement she should double it.  If not, she needs to make sure she is taking 2000 units daily over-the-counter.  She should also have calcium and vitamin D in her diet.

## 2024-09-11 ENCOUNTER — APPOINTMENT (OUTPATIENT)
Dept: PRIMARY CARE | Facility: CLINIC | Age: 59
End: 2024-09-11
Payer: COMMERCIAL

## 2024-09-11 DIAGNOSIS — R09.81 NASAL CONGESTION: Primary | ICD-10-CM

## 2024-09-11 DIAGNOSIS — J06.9 VIRAL UPPER RESPIRATORY TRACT INFECTION: ICD-10-CM

## 2024-09-11 DIAGNOSIS — R50.9 FEVER, UNSPECIFIED FEVER CAUSE: ICD-10-CM

## 2024-09-11 PROCEDURE — 4010F ACE/ARB THERAPY RXD/TAKEN: CPT | Performed by: FAMILY MEDICINE

## 2024-09-11 PROCEDURE — 3060F POS MICROALBUMINURIA REV: CPT | Performed by: FAMILY MEDICINE

## 2024-09-11 PROCEDURE — 3048F LDL-C <100 MG/DL: CPT | Performed by: FAMILY MEDICINE

## 2024-09-11 PROCEDURE — 1036F TOBACCO NON-USER: CPT | Performed by: FAMILY MEDICINE

## 2024-09-11 PROCEDURE — 3044F HG A1C LEVEL LT 7.0%: CPT | Performed by: FAMILY MEDICINE

## 2024-09-11 PROCEDURE — 99442 PR PHYS/QHP TELEPHONE EVALUATION 11-20 MIN: CPT | Performed by: FAMILY MEDICINE

## 2024-09-11 RX ORDER — AZITHROMYCIN 250 MG/1
TABLET, FILM COATED ORAL
Qty: 6 TABLET | Refills: 0 | Status: SHIPPED | OUTPATIENT
Start: 2024-09-11 | End: 2024-09-17

## 2024-09-11 ASSESSMENT — PATIENT HEALTH QUESTIONNAIRE - PHQ9
SUM OF ALL RESPONSES TO PHQ9 QUESTIONS 1 AND 2: 0
2. FEELING DOWN, DEPRESSED OR HOPELESS: NOT AT ALL
1. LITTLE INTEREST OR PLEASURE IN DOING THINGS: NOT AT ALL

## 2024-09-11 ASSESSMENT — ENCOUNTER SYMPTOMS: COUGH: 1

## 2024-09-11 NOTE — ASSESSMENT & PLAN NOTE
Symptom relief discussed.  Rest, fluids, acetaminophen as needed.    If symptoms persist or worsen over the next week, may get the antibiotic.    Call with questions or concerns.

## 2024-09-11 NOTE — ASSESSMENT & PLAN NOTE
sana Wright.   Detail Level: Detailed Render Post-Care Instructions In Note?: no Consent: The patient's consent was obtained including but not limited to risks of crusting, scabbing, blistering, scarring, darker or lighter pigmentary change, recurrence, incomplete removal and infection. Number Of Freeze-Thaw Cycles: 1 freeze-thaw cycle Post-Care Instructions: I reviewed with the patient in detail post-care instructions. Patient is to wear sunprotection, and avoid picking at any of the treated lesions. Pt may apply Vaseline to crusted or scabbing areas. Duration Of Freeze Thaw-Cycle (Seconds): 3

## 2024-09-11 NOTE — PROGRESS NOTES
"Subjective   Chief complaint: Joselin Dunn is a 59 y.o. female who presents for Follow-up (3 month follow up ), URI (Patient also complaining of a URI for the past week), Nasal Congestion, and Cough (Dry cough).    HPI:  Virtual or Telephone Consent    A telephone visit (audio only) between the patient (at the originating site) and the provider (at the distant site) was utilized to provide this telehealth service.   Verbal consent was requested and obtained from Joselin Dunn on this date, 09/11/24 for a telehealth visit.   Patient was supposed to have a follow-up in office today, however she had become ill about 3 to 4 days ago.  Had a fever of about 103 at the start of her illness but now it is down.  Decreased appetite, increased congestion, intermittent cough, no sore throat.  She has sinus congestion persistently.  Feels as if she is \"staying cold a lot.\"  No nausea, vomiting, diarrhea or other GI complaints.  No other changes in her history.      URI   Associated symptoms include coughing.   Cough      Objective   LMP  (LMP Unknown)   Physical Exam  Alert, pleasant, no acute distress.  Voice sounds nasally.    Respirations nonlabored, no dyspnea  Mood is appropriate, no depression no anxiety.  Review of Systems   Respiratory:  Positive for cough.       I have reviewed and reconciled the medication list with the patient today.   Current Outpatient Medications:   •  blood sugar diagnostic (True Metrix Glucose Test Strip) strip, 1 strip by subdermal route 2 times a day., Disp: 100 strip, Rfl: 3  •  buPROPion XL (Wellbutrin XL) 300 mg 24 hr tablet, Take 1 tablet (300 mg) by mouth once daily in the morning. Take before meals., Disp: , Rfl:   •  carvedilol (Coreg) 25 mg tablet, Take 1 and 1/2 tablets by mouth twice daily., Disp: 270 tablet, Rfl: 1  •  cholecalciferol (Vitamin D-3) 25 MCG (1000 UT) tablet, once every 24 hours., Disp: , Rfl:   •  dilTIAZem ER (Tiazac) 120 mg 24 hr capsule, Take 1 capsule (120 mg) " by mouth once daily., Disp: , Rfl:   •  levothyroxine (Synthroid, Levoxyl) 112 mcg tablet, Take 1 tablet (112 mcg) by mouth early in the morning.. Take on an empty stomach at the same time each day, either 30 to 60 minutes prior to breakfast, Disp: 90 tablet, Rfl: 1  •  lisinopril 20 mg tablet, 1 (one) time each day at the same time., Disp: 90 tablet, Rfl: 3  •  metFORMIN (Glucophage) 500 mg tablet, Take 1 tablet by mouth twice daily., Disp: 90 tablet, Rfl: 3  •  omeprazole (PriLOSEC) 40 mg DR capsule, Take 1 capsule (40 mg) by mouth once daily in the morning. Take before meals., Disp: 90 capsule, Rfl: 1  •  oxybutynin (Ditropan) 5 mg tablet, Take 1 tablet (5 mg) by mouth 2 times a day., Disp: 90 tablet, Rfl: 0  •  potassium chloride CR 20 mEq ER tablet, 1 (one) time each day at the same time., Disp: 90 tablet, Rfl: 1  •  rosuvastatin (Crestor) 20 mg tablet, 1 (one) time each day at the same time., Disp: 90 tablet, Rfl: 3  •  Trulicity 0.75 mg/0.5 mL pen injector, Inject 0.75 mg under the skin once weekly., Disp: 2 mL, Rfl: 11  •  azithromycin (Zithromax) 250 mg tablet, Take 2 tablets (500 mg) by mouth once daily for 1 day, THEN 1 tablet (250 mg) once daily for 5 days. Take 2 tabs (500 mg) by mouth today, than 1 daily for 4 days.., Disp: 6 tablet, Rfl: 0     Imaging:  No results found.     Labs reviewed:    Lab Results   Component Value Date    WBC 8.7 08/16/2024    HGB 11.5 (L) 08/16/2024    HCT 35.1 (L) 08/16/2024     08/16/2024    CHOL 92 05/07/2024    TRIG 202 (H) 05/07/2024    HDL 39.9 05/07/2024    ALT 17 05/07/2024    AST 17 05/07/2024     08/16/2024    K 4.3 08/16/2024     (H) 08/16/2024    CREATININE 1.04 08/16/2024    BUN 19 08/16/2024    CO2 23 08/16/2024    TSH 0.69 09/03/2024    HGBA1C 5.6 05/07/2024       Assessment/Plan   Problem List Items Addressed This Visit       Fever     Resolved         Relevant Medications    azithromycin (Zithromax) 250 mg tablet    Nasal congestion -  Primary     Symptom relief discussed.  Rest, fluids, acetaminophen as needed.    If symptoms persist or worsen over the next week, may get the antibiotic.    Call with questions or concerns.         Viral upper respiratory tract infection     Symptomatic r,elief.         Relevant Medications    azithromycin (Zithromax) 250 mg tablet       Continue current medications as listed  Follow up in 3-6 months.  Sooner as needed.  Time Spent  Prep time on day of patient encounter: 2 minutes  Time spent directly with patient, family or caregiver: 10 minutes  Documentation Time: 4 minutes

## 2024-09-27 DIAGNOSIS — N39.46 MIXED STRESS AND URGE URINARY INCONTINENCE: ICD-10-CM

## 2024-09-27 RX ORDER — OXYBUTYNIN CHLORIDE 5 MG/1
5 TABLET ORAL 2 TIMES DAILY
Qty: 180 TABLET | Refills: 1 | Status: SHIPPED | OUTPATIENT
Start: 2024-09-27

## 2024-10-01 ENCOUNTER — APPOINTMENT (OUTPATIENT)
Dept: ORTHOPEDIC SURGERY | Facility: CLINIC | Age: 59
End: 2024-10-01
Payer: COMMERCIAL

## 2024-10-04 ENCOUNTER — APPOINTMENT (OUTPATIENT)
Dept: OTOLARYNGOLOGY | Facility: CLINIC | Age: 59
End: 2024-10-04
Payer: COMMERCIAL

## 2024-10-27 DIAGNOSIS — I10 PRIMARY HYPERTENSION: ICD-10-CM

## 2024-10-27 DIAGNOSIS — E05.80 IATROGENIC HYPERTHYROIDISM: ICD-10-CM

## 2024-10-28 RX ORDER — CARVEDILOL 25 MG/1
37.5 TABLET ORAL 2 TIMES DAILY
Qty: 270 TABLET | Refills: 1 | Status: SHIPPED | OUTPATIENT
Start: 2024-10-28

## 2024-10-28 RX ORDER — LEVOTHYROXINE SODIUM 112 UG/1
TABLET ORAL
Qty: 90 TABLET | Refills: 1 | Status: SHIPPED | OUTPATIENT
Start: 2024-10-28

## 2024-11-19 ENCOUNTER — APPOINTMENT (OUTPATIENT)
Dept: ORTHOPEDIC SURGERY | Facility: CLINIC | Age: 59
End: 2024-11-19
Payer: COMMERCIAL

## 2024-11-19 DIAGNOSIS — E11.9 TYPE 2 DIABETES MELLITUS WITHOUT COMPLICATION, UNSPECIFIED WHETHER LONG TERM INSULIN USE (MULTI): ICD-10-CM

## 2024-11-19 RX ORDER — CALCIUM CITRATE/VITAMIN D3 200MG-6.25
1 TABLET ORAL 2 TIMES DAILY
Qty: 100 STRIP | Refills: 3 | Status: SHIPPED | OUTPATIENT
Start: 2024-11-19

## 2024-12-03 ENCOUNTER — OFFICE VISIT (OUTPATIENT)
Dept: ORTHOPEDIC SURGERY | Facility: CLINIC | Age: 59
End: 2024-12-03
Payer: COMMERCIAL

## 2024-12-03 DIAGNOSIS — M19.049 CMC ARTHRITIS: Primary | ICD-10-CM

## 2024-12-03 PROCEDURE — 20600 DRAIN/INJ JOINT/BURSA W/O US: CPT | Mod: RT | Performed by: ORTHOPAEDIC SURGERY

## 2024-12-03 PROCEDURE — 99214 OFFICE O/P EST MOD 30 MIN: CPT | Performed by: ORTHOPAEDIC SURGERY

## 2024-12-03 PROCEDURE — 3048F LDL-C <100 MG/DL: CPT | Performed by: ORTHOPAEDIC SURGERY

## 2024-12-03 PROCEDURE — 4010F ACE/ARB THERAPY RXD/TAKEN: CPT | Performed by: ORTHOPAEDIC SURGERY

## 2024-12-03 PROCEDURE — 1036F TOBACCO NON-USER: CPT | Performed by: ORTHOPAEDIC SURGERY

## 2024-12-03 PROCEDURE — 2500000004 HC RX 250 GENERAL PHARMACY W/ HCPCS (ALT 636 FOR OP/ED): Performed by: ORTHOPAEDIC SURGERY

## 2024-12-03 PROCEDURE — 20600 DRAIN/INJ JOINT/BURSA W/O US: CPT | Mod: LT | Performed by: ORTHOPAEDIC SURGERY

## 2024-12-03 PROCEDURE — 3060F POS MICROALBUMINURIA REV: CPT | Performed by: ORTHOPAEDIC SURGERY

## 2024-12-03 PROCEDURE — 3044F HG A1C LEVEL LT 7.0%: CPT | Performed by: ORTHOPAEDIC SURGERY

## 2024-12-03 RX ORDER — VORTIOXETINE 10 MG/1
10 TABLET, FILM COATED ORAL DAILY
COMMUNITY
Start: 2024-11-23

## 2024-12-03 RX ORDER — LIDOCAINE HYDROCHLORIDE 10 MG/ML
0.5 INJECTION, SOLUTION INFILTRATION; PERINEURAL
Status: COMPLETED | OUTPATIENT
Start: 2024-12-03 | End: 2024-12-03

## 2024-12-03 RX ORDER — BUSPIRONE HYDROCHLORIDE 10 MG/1
10 TABLET ORAL 3 TIMES DAILY
COMMUNITY
Start: 2024-11-25

## 2024-12-03 NOTE — PROGRESS NOTES
12/3/2024    Chief Complaint   Patient presents with    Left Thumb - Osteoarthritis     S/p inj 8/15/24    Right Thumb - Osteoarthritis     S/p inj 8/15/24       History of Present Illness:  Patient Joselin Dunn , 59 y.o. female, presents today, 12/3/2024, for evaluation of bilateral thumb pain.  She underwent previous injections for bilateral thumb CMC arthritis in August of this year.  She got about 3 months of symptomatic relief and now over the last few weeks symptoms of pain and difficulties pinch and  are returning.  She denies any new injury or trauma.  She is inquire about repeating injections today.       Review of Systems:   GENERAL: Negative  GI: Negative  MUSCULOSKELETAL: See HPI  SKIN: Negative  NEURO:  Negative     Physical Exam:  GENERAL:  Alert and oriented to person, place, and time.  No acute distress and breathing comfortably; pleasant and cooperative with the examination.  HEENT:  Head is normocephalic and atraumatic.  NECK:  Supple, no visible swelling.  CARDIOVASCULAR:  No palpable tachycardia.  LUNGS:  No audible wheezing or labored breathing.  ABDOMEN:  Nondistended.  Extremities: Evaluation of bilateral upper extremities finds the patient to have a palpable radial artery at the wrist with brisk capillary refill to all digits. The patient has intact sensorium to axillary, radial, median and ulnar nerves. There are no open wounds. There are no signs of infection. There is no evidence of lymphedema or lymphatic streaking. The patient has supple compartments of the bilateral arms, forearms and hands.  Tender to palpation of the bilateral thumb CMC articulations a positive is a grind test.     Imaging/Test Results:  None today.     Assessment:  Bilateral thumb CMC arthritis.     Plan:  Treatment options including operative and nonoperative strategies were reviewed.  Patient strong preference for continued nonoperative management.  She elects for repeat Kenalog injection of the  bilateral thumbs.  These were performed off today by Dr. Van and tolerated well by patient.  Continue with weightbearing activities as tolerated and bracing as needed.  Follow-up with our office again in 3 months for repeat clinical exam, no x-rays necessary upon return.  Will discuss repeat injection again at that time.    Of note, patient has some history of some chronic hip pain we recommend she follow-up with one of her treating partners, Dr. Manuel Mayen for further evaluation.        Hand / UE Inj/Asp: R thumb CMC for osteoarthritis on 12/3/2024 2:20 PM  Indications: pain  Details: 25 G needle, dorsal approach  Medications: 5 mg triamcinolone acetonide 10 mg/mL; 0.5 mL lidocaine 10 mg/mL (1 %)  Outcome: tolerated well, no immediate complications    Right Thumb Carpometacarpal Joint Injection: It was explained to the patient that the risks of a steroid injection include but are not limited to infection, local skin irritation, skin atrophy, calcification, continued pain and discomfort, elevated blood sugar, burning, failure to relieve pain, and possible late infection. The patient verbalized good insight and verbalized consent for the injection. It was further explained that the postinjection discomfort can be alleviated with additional medications, ice, elevation, and rest over the first 24 hours, and that these modalities are recommended.    Using aseptic technique, a solution containing 0.5 cc of 5 mg of Kenalog and 0.5 mL of 1% lidocaine without epinephrine was injected intraarticularly to the right thumb carpometacarpal joint. Digital palpation was used to localize the CMC articulation about the dorsal radial aspect of the joint. Gentle traction was then imparted to the thumb distally and a 25-gauge needle was advanced through the skin, subcutaneous tissue and capsule. The injection was then administered and the patient tolerated the injection well. A band-aid was then placed. It should be noted that  ethyl chloride spray was used to make the injection delivery more comfortable for the patient.  Procedure, treatment alternatives, risks and benefits explained, specific risks discussed. Consent was given by the patient. Immediately prior to procedure a time out was called to verify the correct patient, procedure, equipment, support staff and site/side marked as required. Patient was prepped and draped in the usual sterile fashion.       Hand / UE Inj/Asp: L thumb CMC for osteoarthritis on 12/3/2024 2:20 PM  Indications: pain  Details: 25 G needle, dorsal approach  Medications: 5 mg triamcinolone acetonide 10 mg/mL; 0.5 mL lidocaine 10 mg/mL (1 %)  Outcome: tolerated well, no immediate complications    Left Thumb Carpometacarpal Joint Injection: It was explained to the patient that the risks of a steroid injection include but are not limited to infection, local skin irritation, skin atrophy, calcification, continued pain and discomfort, elevated blood sugar, burning, failure to relieve pain, and possible late infection. The patient verbalized good insight and verbalized consent for the injection. It was further explained that the postinjection discomfort can be alleviated with additional medications, ice, elevation, and rest over the first 24 hours, and that these modalities are recommended.    Using aseptic technique, a solution containing 0.5 cc of 5 mg of Kenalog and 0.5 mL of 1% lidocaine without epinephrine was injected intraarticularly to the left thumb carpometacarpal joint. Digital palpation was used to localize the CMC articulation about the dorsal radial aspect of the joint. Gentle traction was then imparted to the thumb distally and a 25-gauge needle was advanced through the skin, subcutaneous tissue and capsule. The injection was then administered and the patient tolerated the injection well. A band-aid was then placed. It should be noted that ethyl chloride spray was used to make the injection delivery  more comfortable for the patient.  Procedure, treatment alternatives, risks and benefits explained, specific risks discussed. Consent was given by the patient. Immediately prior to procedure a time out was called to verify the correct patient, procedure, equipment, support staff and site/side marked as required. Patient was prepped and draped in the usual sterile fashion.       In a face to face encounter, I performed a history and physical examination, discussed pertinent diagnostic studies if indicated, and discussed diagnosis and management strategies with both the patient and the mid-level provider. I reviewed the mid-level's note and agree with the documented findings and plan of care.  Patient presents today for evaluation of bilateral thumb CMC arthritis.  She is also having some bilateral hip pain.  Previous steroid injections worked well to the bilateral thumbs but now symptoms are worsening.  Focal tenderness to bilateral thumbs at CMC articulations.  Treatment options were discussed.  We talked about operative and nonoperative strategies.  Patient elects for steroid injection to bilateral thumb CMC joints.  Follow-up with Manuel Mayen for hip pain.

## 2024-12-06 ENCOUNTER — CLINICAL SUPPORT (OUTPATIENT)
Dept: AUDIOLOGY | Facility: CLINIC | Age: 59
End: 2024-12-06
Payer: COMMERCIAL

## 2024-12-06 ENCOUNTER — APPOINTMENT (OUTPATIENT)
Dept: OTOLARYNGOLOGY | Facility: CLINIC | Age: 59
End: 2024-12-06
Payer: COMMERCIAL

## 2024-12-06 DIAGNOSIS — H90.3 BILATERAL SENSORINEURAL HEARING LOSS: Primary | ICD-10-CM

## 2024-12-06 PROCEDURE — 92567 TYMPANOMETRY: CPT | Performed by: AUDIOLOGIST

## 2024-12-06 PROCEDURE — 99213 OFFICE O/P EST LOW 20 MIN: CPT | Performed by: OTOLARYNGOLOGY

## 2024-12-06 PROCEDURE — 92557 COMPREHENSIVE HEARING TEST: CPT | Performed by: AUDIOLOGIST

## 2024-12-06 PROCEDURE — 3048F LDL-C <100 MG/DL: CPT | Performed by: OTOLARYNGOLOGY

## 2024-12-06 PROCEDURE — 4010F ACE/ARB THERAPY RXD/TAKEN: CPT | Performed by: OTOLARYNGOLOGY

## 2024-12-06 PROCEDURE — 3060F POS MICROALBUMINURIA REV: CPT | Performed by: OTOLARYNGOLOGY

## 2024-12-06 PROCEDURE — 3044F HG A1C LEVEL LT 7.0%: CPT | Performed by: OTOLARYNGOLOGY

## 2024-12-06 NOTE — PROGRESS NOTES
Ms. Dunn, age 59, was seen today for a hearing evaluation during her ENT visit with Dr. Cameron.  She denied any changes or new concerns with her ears or hearing since her visit one year ago.    Results:  Otoscopy revealed clear ear canals and tympanic membranes were visualized bilaterally.  Tympanometry revealed normal, Type A tympanograms, indicating normal ear canal volume, peak pressure and compliance bilaterally.   Audiometric thresholds revealed a slight sloping to moderate sensorineural hearing loss bilaterally.  Word recognition scores were excellent bilaterally.  Hearing levels have remained stable as compared to her last evaluation September 2023.    Recommendations:  Follow-up with PCP, Dr. Hernandez, as medically directed.  Follow-up with ENT, Dr. Cameron, as medically directed.  Retest hearing annually to monitor.

## 2024-12-06 NOTE — PROGRESS NOTES
The patient returns.  We are seeing her back today surveillance recheck history of bilateral hearing loss.  She is doing fairly well.  No dramatic change of fluctuation.  No worrisome tinnitus or vertigo.  All remaining head neck inquiry is clear.        Exam:    No acute distress.  The external ear structures appear normal. The ear canals patent and the tympanic membranes are intact without evidence of air-fluid levels, retraction, or congenital defects.  Anterior rhinoscopy notes essentially a midline nasal septum. Examination is noted for normal healthy mucosal membranes without any evidence of lesions, polyps, or exudate. The tongue is normally mobile. There are no lesions on the gingiva, buccal, or oral mucosa. There are no oral cavity masses.  The neck is negative for mass lymphadenopathy. The trachea and parotid are clear. The thyroid bed is grossly unremarkable. The salivary gland structures are grossly unremarkable.      Audiogram:  Bilateral stable sensorineural hearing loss above 500 Hz down to 50 dB    Assessment and plan:  Bilateral stable sensorineural hearing loss.  Overall relatively mild.  She does not have any major issues so we will sit tight and observe with recommendation for recheck in 1 year sooner with issue.  All questions were answered in this regard accordingly.

## 2024-12-10 DIAGNOSIS — M25.551 RIGHT HIP PAIN: Primary | ICD-10-CM

## 2024-12-17 ENCOUNTER — HOSPITAL ENCOUNTER (OUTPATIENT)
Dept: RADIOLOGY | Facility: HOSPITAL | Age: 59
Discharge: HOME | End: 2024-12-17
Payer: COMMERCIAL

## 2024-12-17 ENCOUNTER — APPOINTMENT (OUTPATIENT)
Dept: ORTHOPEDIC SURGERY | Facility: CLINIC | Age: 59
End: 2024-12-17
Payer: COMMERCIAL

## 2024-12-17 DIAGNOSIS — M25.551 RIGHT HIP PAIN: ICD-10-CM

## 2024-12-17 DIAGNOSIS — M54.50 RIGHT LOW BACK PAIN, UNSPECIFIED CHRONICITY, UNSPECIFIED WHETHER SCIATICA PRESENT: Primary | ICD-10-CM

## 2024-12-17 PROCEDURE — 3044F HG A1C LEVEL LT 7.0%: CPT | Performed by: ORTHOPAEDIC SURGERY

## 2024-12-17 PROCEDURE — 3048F LDL-C <100 MG/DL: CPT | Performed by: ORTHOPAEDIC SURGERY

## 2024-12-17 PROCEDURE — 3060F POS MICROALBUMINURIA REV: CPT | Performed by: ORTHOPAEDIC SURGERY

## 2024-12-17 PROCEDURE — 99214 OFFICE O/P EST MOD 30 MIN: CPT | Performed by: ORTHOPAEDIC SURGERY

## 2024-12-17 PROCEDURE — 73502 X-RAY EXAM HIP UNI 2-3 VIEWS: CPT | Mod: RIGHT SIDE | Performed by: RADIOLOGY

## 2024-12-17 PROCEDURE — 73502 X-RAY EXAM HIP UNI 2-3 VIEWS: CPT | Mod: RT

## 2024-12-17 PROCEDURE — 4010F ACE/ARB THERAPY RXD/TAKEN: CPT | Performed by: ORTHOPAEDIC SURGERY

## 2024-12-17 RX ORDER — MELOXICAM 15 MG/1
15 TABLET ORAL DAILY
Qty: 30 TABLET | Refills: 0 | Status: SHIPPED | OUTPATIENT
Start: 2024-12-17 | End: 2025-01-16

## 2024-12-17 NOTE — PROGRESS NOTES
History of Present Illness   This is a 59-year-old female here for her right hip.  She is pointing though to her low back where she gets pain.  She denies any groin or thigh pain.  She denies any pain radiating down the right lower extremity.    She has tried heat and ice.  She has had pain for a few months.     Review of Systems   GENERAL: Negative for malaise, significant weight loss, fever  MUSCULOSKELETAL: see HPI  NEURO:  Negative     Past Medical History:   Diagnosis Date    Angina pectoris     Arthritis     CPAP (continuous positive airway pressure) dependence     Depression     Diabetes mellitus (Multi)     GERD (gastroesophageal reflux disease)     HPV (human papilloma virus) infection     Hyperlipidemia, unspecified     Dyslipidemia    Hypertension     Hypothyroidism     Mechanical loosening of other internal prosthetic joint, initial encounter (CMS-[facility]) 02/19/2020    Aseptic loosening of prosthetic knee    Personal history of colonic polyps 08/02/2017    History of colon polyps    Radiculopathy, cervical region 02/19/2020    Cervical radiculopathy, acute    Sleep apnea     Stress incontinence     Type 2 diabetes mellitus         Medication Documentation Review Audit       Reviewed by Eh Cameron MD (Physician) on 12/06/24 at 1435      Medication Order Taking? Sig Documenting Provider Last Dose Status   blood sugar diagnostic (True Metrix Glucose Test Strip) strip [id number]  1 strip by subdermal route 2 times a day. Lisa Hernandez MD  Active   buPROPion XL (Wellbutrin XL) 300 mg 24 hr tablet [id number] No Take 1 tablet (300 mg) by mouth once daily in the morning. Take before meals. Historical Provider, MD Taking Active   busPIRone (Buspar) 10 mg tablet [id number]  Take 1 tablet (10 mg) by mouth 3 times a day. Historical Provider, MD  Active   carvedilol (Coreg) 25 mg tablet [id number]  Take 1 and 1/2 tablets by mouth twice daily. Lisa Hernandez MD  Active   cholecalciferol  (Vitamin D-3) 25 MCG (1000 UT) tablet [id number] No once every 24 hours. Historical Provider, MD Taking Active   dilTIAZem ER (Tiazac) 120 mg 24 hr capsule [id number] No Take 1 capsule (120 mg) by mouth once daily. Historical Provider, MD Taking Active   levothyroxine (Synthroid, Levoxyl) 112 mcg tablet [id number]  Take 1 tablet by mouth on an empty stomach daily at the same time early in the morning either 30 to 60 minutes prior to breakfast. Lisa Hernandez MD  Active   lisinopril 20 mg tablet [id number] No 1 (one) time each day at the same time. Lisa Hernandez MD Taking Active   metFORMIN (Glucophage) 500 mg tablet [id number] No Take 1 tablet by mouth twice daily. Lisa Hernandez MD Taking Active   omeprazole (PriLOSEC) 40 mg DR capsule [id number] No Take 1 capsule (40 mg) by mouth once daily in the morning. Take before meals. Lisa Hernandez MD Taking Active   oxybutynin (Ditropan) 5 mg tablet [id number]  Take 1 tablet by mouth twice daily. Lisa Hernandez MD  Active   potassium chloride CR 20 mEq ER tablet [id number] No 1 (one) time each day at the same time. Lisa Hernandez MD Taking Active   rosuvastatin (Crestor) 20 mg tablet [id number] No 1 (one) time each day at the same time. Lisa Hernandez MD Taking Active   Trintellix 10 mg tablet tablet [id number]  Take 1 tablet (10 mg) by mouth once daily. Historical Provider, MD  Active   Trulicity 0.75 mg/0.5 mL pen injector [id number] No Inject 0.75 mg under the skin once weekly. Lisa Hernandez MD Taking Active                     Physical Exam  This is an overweight female in no acute distress  She has tenderness to palpation along the right lower lumbar spine.  Leg raise is negative on the right with right hip flexion, internal and external rotation she had smooth motion and no pain she had no tenderness to palpation over the greater trochanter.    She has intact hip flexion, knee  extension as well as ankle dorsiflexion and plantarflexion.     Imaging  See dictated report       Assessment   Mechanical low back pain     Plan  Mobic  Physical therapy  Referral to spine team   questions answered          Procedures

## 2024-12-17 NOTE — PROGRESS NOTES
History of Present Illness  No chief complaint on file.      Patient presents with side: right hip pain for several years.  It has been worsening over the past  {months:43450} months.  The patient localizes the pain predominantly in the {hip location:31356}.  Recently there has been concern for falls and instability.  There is increasing difficulty with activities of daily living and significant disability related to the hip pain.  The patient endorses the following failed non-operative treatments: {treatment options:24321}.   There is increasing frustration with persistent pain and discomfort and decreasing distance of ambulation.    Pain is described as {pain description:08030}.  Better with rest, worse with activity.   Pain level: {PAIN SCALE NUMBERS:22408}  Assistive device: {amb assistive device:21834}  History of surgery on the hip: ***  Back pain: {YES (DEF)/NO:78860}  Numbness or tingling radiating to the lower extremities: {YES (DEF)/NO:05961}    Past Medical History:   Diagnosis Date    Angina pectoris     Arthritis     CPAP (continuous positive airway pressure) dependence     Depression     Diabetes mellitus (Multi)     GERD (gastroesophageal reflux disease)     HPV (human papilloma virus) infection     Hyperlipidemia, unspecified     Dyslipidemia    Hypertension     Hypothyroidism     Mechanical loosening of other internal prosthetic joint, initial encounter (CMS[facility]) 02/19/2020    Aseptic loosening of prosthetic knee    Personal history of colonic polyps 08/02/2017    History of colon polyps    Radiculopathy, cervical region 02/19/2020    Cervical radiculopathy, acute    Sleep apnea     Stress incontinence     Type 2 diabetes mellitus        Medication Documentation Review Audit       Reviewed by Eh Cameron MD (Physician) on 12/06/24 at 1435      Medication Order Taking? Sig Documenting Provider Last Dose Status   blood sugar diagnostic (True Metrix Glucose Test Strip) strip [id number]  1 strip by  subdermal route 2 times a day. Lisa Hernandez MD  Active   buPROPion XL (Wellbutrin XL) 300 mg 24 hr tablet [id number] No Take 1 tablet (300 mg) by mouth once daily in the morning. Take before meals. Benjamin Provider, MD Taking Active   busPIRone (Buspar) 10 mg tablet [id number]  Take 1 tablet (10 mg) by mouth 3 times a day. Historical Provider, MD  Active   carvedilol (Coreg) 25 mg tablet [id number]  Take 1 and 1/2 tablets by mouth twice daily. Lisa Hernandez MD  Active   cholecalciferol (Vitamin D-3) 25 MCG (1000 UT) tablet [id number] No once every 24 hours. Benjamin Provider, MD Taking Active   dilTIAZem ER (Tiazac) 120 mg 24 hr capsule [id number] No Take 1 capsule (120 mg) by mouth once daily. Benjamin Provider, MD Taking Active   levothyroxine (Synthroid, Levoxyl) 112 mcg tablet [id number]  Take 1 tablet by mouth on an empty stomach daily at the same time early in the morning either 30 to 60 minutes prior to breakfast. Lisa Hernandez MD  Active   lisinopril 20 mg tablet [id number] No 1 (one) time each day at the same time. Lisa Hernandez MD Taking Active   metFORMIN (Glucophage) 500 mg tablet [id number] No Take 1 tablet by mouth twice daily. Lisa Hernandez MD Taking Active   omeprazole (PriLOSEC) 40 mg DR capsule [id number] No Take 1 capsule (40 mg) by mouth once daily in the morning. Take before meals. Lisa Hernandez MD Taking Active   oxybutynin (Ditropan) 5 mg tablet [id number]  Take 1 tablet by mouth twice daily. Lisa Hernandez MD  Active   potassium chloride CR 20 mEq ER tablet [id number] No 1 (one) time each day at the same time. Lisa Hernandez MD Taking Active   rosuvastatin (Crestor) 20 mg tablet [id number] No 1 (one) time each day at the same time. Lisa Hernandez MD Taking Active   Trintellix 10 mg tablet tablet [id number]  Take 1 tablet (10 mg) by mouth once daily. Historical Provider, MD  Active  "  Trulicity 0.75 mg/0.5 mL pen injector [id number] No Inject 0.75 mg under the skin once weekly. Lisa Hernandez MD Taking Active                    Allergies   Allergen Reactions    Sulfa (Sulfonamide Antibiotics) Other     \"Get a fever and skin becomes painful\"    Penicillins Itching and Rash       Social History     Socioeconomic History    Marital status:      Spouse name: Not on file    Number of children: Not on file    Years of education: Not on file    Highest education level: Not on file   Occupational History    Not on file   Tobacco Use    Smoking status: Former     Current packs/day: 0.00     Types: Cigarettes     Quit date: 2020     Years since quittin.9    Smokeless tobacco: Former    Tobacco comments:     Very bad habit   Vaping Use    Vaping status: Never Used   Substance and Sexual Activity    Alcohol use: Not Currently     Comment: Rarely drink alcohol    Drug use: Not Currently     Types: Marijuana     Comment: Stopped at 21    Sexual activity: Not Currently     Partners: Male     Birth control/protection: Abstinence     Comment: LMP: least 5 years   Other Topics Concern    Not on file   Social History Narrative    Not on file     Social Drivers of Health     Financial Resource Strain: Not on file   Food Insecurity: Not on file   Transportation Needs: Not on file   Physical Activity: Not on file   Stress: Not on file   Social Connections: Not on file   Intimate Partner Violence: Not on file   Housing Stability: Not on file       Past Surgical History:   Procedure Laterality Date    ANKLE SURGERY  10/29/2021    Ankle Surgery    CATARACT EXTRACTION      CERVICAL CONIZATION   W/ LASER  2024    CHOLECYSTECTOMY  2016    Cholecystectomy    COLONOSCOPY W/ POLYPECTOMY      COLPOSCOPY  2023    wnl    HERNIA REPAIR      umbilical    SHOULDER SURGERY      TOTAL KNEE ARTHROPLASTY Bilateral 2016    Knee Replacement    TUBAL LIGATION         No images are " attached to the encounter.    BMI  ***       Review of Systems   GENERAL: Negative for malaise, significant weight loss, fever  MUSCULOSKELETAL: see HPI  NEURO:  Negative     Exam  {side:83199} Hip:  Antalgic gait  Negative Trendelenburg sign  Skin healthy and intact  No tenderness to palpation over lumbar spine  Minimal tenderness over greater trochanter     Range of motion:  Flexion: {hip flexion:00729} internal rotation: {hip internal rotation:86012} external rotation: {hip external rotation:53307} abduction: {hip abduction:07005}  Pain with: {HIP EXAM POSITIVES:20047}  No weakness with resisted hip flexion, abduction or adduction     Negative straight leg raise  Neurovascular exam normal distally     Radiographs  CT lung screening low dose  Narrative: Interpreted By:  Lowell Bo,   STUDY:  CT LUNG SCREENING LOW DOSE;  6/18/2024 2:37 pm      INDICATION:  Signs/Symptoms:screening.      COMPARISON:  None.      ACCESSION NUMBER(S):  [id number]      ORDERING CLINICIAN:  VIVIAN MOMIN      TECHNIQUE:  Helical data acquisition of the chest was obtained without IV  contrast material.  Images were reformatted in axial, coronal, and  sagittal planes.      FINDINGS:  LUNGS AND AIRWAYS:  The trachea and central airways are patent. No endobronchial lesion  is seen.      There is mild subpleural emphysematous changes and reticulation  consistent with smoking-related interstitial changes. There is no  focal consolidation, pleural effusion, or pneumothorax.      There are no suspicious parenchymal lung nodules.      MEDIASTINUM AND SHELBY, LOWER NECK AND AXILLA:  The visualized thyroid gland is within normal limits.  Scattered mediastinal lymph nodes are felt to be  reactive/inflammatory in nature. There are mildly prominent left  axillary lymph nodes. Esophagus appears within normal limits as seen.      HEART AND VESSELS:  There is mild atherosclerotic changes of the thoracic aorta.  Main pulmonary artery and  its branches are normal in caliber.  There is mild coronary artery calcifications. Estimated CT calcium  score: 42 The cardiac chambers are not enlarged.  There is no pericardial effusion seen.      UPPER ABDOMEN:  The patient is status post cholecystectomy. There is moderate  atherosclerotic calcifications of the abdominal aorta and its  branches.              CHEST WALL AND OSSEOUS STRUCTURES:  Chest wall is within normal limits.  No acute osseous pathology.There are no suspicious osseous lesions.      Impression: 1. There are no suspicious parenchymal lung nodules. Recommend  continued 1 year low-dose chest CT for surveillance.  2. Smoking-related interstitial changes.  3. Left axillary lymph nodes most likely reactive/inflammatory in  nature. Please correlate with physical exam and recent mammography.  Close attention to these axillary lymph nodes can be made on  follow-up low-dose chest CT.          LUNG RADS CATEGORY:  Lung Rad: Lung-RADS 1, S (Negative)      Recommendation: Continue annual screening with Low Dose Chest CT in  12 months, recommended as per American College of Radiology  Guidelines Lung-RADS Version 2022. Lung-RADS S (Other non-nodular  findings) Management as appropriate to finding per American College  of Radiology Guidelines Lung-RADS Version 2022.          MACRO:  None      Signed by: Lowell Bo 6/19/2024 7:33 PM  Dictation workstation:   [id number]         Assessment  Osteoarthrosis {side:95136} hip     Plan  We discussed with the patient the diagnosis of {MILD, MOD, SEV:36067} degenerative joint disease of the hip.  We reviewed an evidence-based approach to osteoarthritis of the hip.  We strongly encouraged low-impact aerobic activity and non-opioid analgesics.  We discussed the role of physical therapy to aid in strengthening and gait training.  We discussed temporary pain relief with corticosteroid injections and the associated risks.      The patient elected for ***.

## 2024-12-20 DIAGNOSIS — E78.2 MIXED HYPERLIPIDEMIA: ICD-10-CM

## 2024-12-20 DIAGNOSIS — E55.9 VITAMIN D INSUFFICIENCY: Primary | ICD-10-CM

## 2024-12-20 DIAGNOSIS — E03.9 ACQUIRED HYPOTHYROIDISM: ICD-10-CM

## 2024-12-20 DIAGNOSIS — E11.9 TYPE 2 DIABETES MELLITUS WITHOUT COMPLICATION, UNSPECIFIED WHETHER LONG TERM INSULIN USE (MULTI): ICD-10-CM

## 2024-12-20 DIAGNOSIS — I10 PRIMARY HYPERTENSION: ICD-10-CM

## 2024-12-20 RX ORDER — DULAGLUTIDE 0.75 MG/.5ML
INJECTION, SOLUTION SUBCUTANEOUS
Qty: 2 ML | Refills: 7 | Status: SHIPPED | OUTPATIENT
Start: 2024-12-20

## 2024-12-26 DIAGNOSIS — I10 PRIMARY HYPERTENSION: ICD-10-CM

## 2024-12-26 DIAGNOSIS — E78.2 MIXED HYPERLIPIDEMIA: ICD-10-CM

## 2024-12-26 RX ORDER — ROSUVASTATIN CALCIUM 20 MG/1
TABLET, COATED ORAL
Qty: 90 TABLET | Refills: 2 | Status: SHIPPED | OUTPATIENT
Start: 2024-12-26

## 2024-12-26 RX ORDER — LISINOPRIL 20 MG/1
TABLET ORAL
Qty: 90 TABLET | Refills: 0 | Status: SHIPPED | OUTPATIENT
Start: 2024-12-26

## 2025-01-07 ENCOUNTER — TELEPHONE (OUTPATIENT)
Dept: CARDIOLOGY | Facility: CLINIC | Age: 60
End: 2025-01-07
Payer: COMMERCIAL

## 2025-01-07 NOTE — TELEPHONE ENCOUNTER
Left voicemail for patient regarding their appointment scheduled for with Khushboo . I informed them the appointment was cancelled due to Khushboo resigning and we would have to move the appointment to be with the doctor. I provided the new date and time for the device check and appointment with  and requested they call back if it did not work or if they needed sooner due to any issues. I also sent the patient Scripted message about the appointment.

## 2025-01-13 ENCOUNTER — OFFICE VISIT (OUTPATIENT)
Dept: ORTHOPEDIC SURGERY | Facility: CLINIC | Age: 60
End: 2025-01-13
Payer: COMMERCIAL

## 2025-01-13 ENCOUNTER — HOSPITAL ENCOUNTER (OUTPATIENT)
Dept: RADIOLOGY | Facility: CLINIC | Age: 60
Discharge: HOME | End: 2025-01-13
Payer: COMMERCIAL

## 2025-01-13 DIAGNOSIS — M54.50 LOW BACK PAIN, UNSPECIFIED BACK PAIN LATERALITY, UNSPECIFIED CHRONICITY, UNSPECIFIED WHETHER SCIATICA PRESENT: ICD-10-CM

## 2025-01-13 DIAGNOSIS — M54.50 RIGHT LOW BACK PAIN, UNSPECIFIED CHRONICITY, UNSPECIFIED WHETHER SCIATICA PRESENT: Primary | ICD-10-CM

## 2025-01-13 PROCEDURE — 99214 OFFICE O/P EST MOD 30 MIN: CPT | Performed by: PHYSICIAN ASSISTANT

## 2025-01-13 PROCEDURE — 72110 X-RAY EXAM L-2 SPINE 4/>VWS: CPT | Performed by: ORTHOPAEDIC SURGERY

## 2025-01-13 PROCEDURE — 4010F ACE/ARB THERAPY RXD/TAKEN: CPT | Performed by: PHYSICIAN ASSISTANT

## 2025-01-13 PROCEDURE — 72120 X-RAY BEND ONLY L-S SPINE: CPT

## 2025-01-13 NOTE — PROGRESS NOTES
Joselin Dunn is a 59 y.o. female who presents for Pain and New Patient Visit of the Lower Back (Pain began around 12/17/24, goes into right hip area/Ref by FS/Xray today).    HPI:  59-year-old female here for new patient evaluation of low back pain and right hip area.  Sent by Dr. Mayen.  She denies any fever chills nausea vomiting night sweats.  She has no bowel or bladder complaints.    Physical exam:  Well-nourished, well kept.No lymphangitis or lymphadenopathy in the examined extremities.  Gait normal.  Can stand on heels and toes.   Examination of the back shows tenderness in the paraspinous musculature mostly on the right lumbosacral and right SI region.  There is decreased range of motion in all directions due to guarding/muscle spasms and pain at extremes.  There is good strength and no instability.  Examination of the lower extremities reveals no point tenderness, swelling, or deformity.  Range of motion of the hips, knees, and ankles are full without crepitance, instability, or exacerbation of pain.  Strength is 5/5 throughout.  No redness, abrasions, or lesions on extremities  Gross sensation intact in the extremities.    Affect normal.  Alert and oriented ×3.  Coordination normal.    Imaging studies:  Ordered and reviewed AP lateral flexion-extension plain films of the lumbar spine.  An x-ray of the right hip and pelvis was also reviewed from December 17, 2024.    Assessment:  59-year-old female here for new patient evaluation of left lumbosacral and left SI back pain.  She was sent by Dr. Mayen.  She originally thought it was her hip that was giving her trouble, it Dr. Mayen evaluated her and decided this was not coming from her hip but this was most likely coming from her back.  It is not radiating into her groin or thigh or anywhere down the leg.  It is right sided SI and lumbosacral pain.  This has been going on for about 6 weeks without any traumatic event or injury.  I cannot reproduce any of her  pain by ranging that right hip.  She is nontender over the greater trochanteric bursa on that side.  She has not yet started physical therapy, but she does have an order, and she does have a place that she wants to go that she has been to in the past.    Plan:  We will get her into some physical therapy, and she will come back and see me in about 6 weeks.  If she is no better at that time or worse we can get an MRI and work this up further, we could also consider sending her to one of our partners for an ultrasound-guided right SI joint injection.    We have ordered and reviewed tests, x-rays x 2.  I reviewed the note from Dr. Mayen from December 17, 2024.  This is an undiagnosed new problem with uncertain prognosis that is affecting her bodily function.    Navid Garcia PA-C

## 2025-01-15 DIAGNOSIS — E11.9 TYPE 2 DIABETES MELLITUS WITHOUT COMPLICATION, UNSPECIFIED WHETHER LONG TERM INSULIN USE (MULTI): ICD-10-CM

## 2025-01-15 DIAGNOSIS — N18.2 CHRONIC KIDNEY DISEASE, STAGE 2 (MILD): Primary | ICD-10-CM

## 2025-01-17 ENCOUNTER — APPOINTMENT (OUTPATIENT)
Dept: PRIMARY CARE | Facility: CLINIC | Age: 60
End: 2025-01-17
Payer: COMMERCIAL

## 2025-01-19 RX ORDER — DULAGLUTIDE 0.75 MG/.5ML
0.75 INJECTION, SOLUTION SUBCUTANEOUS
Qty: 7 ML | Refills: 1 | Status: SHIPPED | OUTPATIENT
Start: 2025-01-19

## 2025-01-23 ENCOUNTER — LAB (OUTPATIENT)
Dept: LAB | Facility: LAB | Age: 60
End: 2025-01-23
Payer: COMMERCIAL

## 2025-01-23 DIAGNOSIS — E03.9 ACQUIRED HYPOTHYROIDISM: ICD-10-CM

## 2025-01-23 DIAGNOSIS — I10 PRIMARY HYPERTENSION: ICD-10-CM

## 2025-01-23 DIAGNOSIS — E11.9 TYPE 2 DIABETES MELLITUS WITHOUT COMPLICATION, UNSPECIFIED WHETHER LONG TERM INSULIN USE (MULTI): ICD-10-CM

## 2025-01-23 DIAGNOSIS — E78.2 MIXED HYPERLIPIDEMIA: ICD-10-CM

## 2025-01-23 DIAGNOSIS — E55.9 VITAMIN D INSUFFICIENCY: ICD-10-CM

## 2025-01-23 DIAGNOSIS — N18.2 CHRONIC KIDNEY DISEASE, STAGE 2 (MILD): ICD-10-CM

## 2025-01-23 LAB
25(OH)D3 SERPL-MCNC: 25 NG/ML (ref 30–100)
ALBUMIN SERPL BCP-MCNC: 4 G/DL (ref 3.4–5)
ALP SERPL-CCNC: 70 U/L (ref 33–110)
ALT SERPL W P-5'-P-CCNC: 32 U/L (ref 7–45)
ANION GAP SERPL CALC-SCNC: 11 MMOL/L (ref 10–20)
AST SERPL W P-5'-P-CCNC: 20 U/L (ref 9–39)
BASOPHILS # BLD AUTO: 0.02 X10*3/UL (ref 0–0.1)
BASOPHILS NFR BLD AUTO: 0.3 %
BILIRUB SERPL-MCNC: 0.6 MG/DL (ref 0–1.2)
BUN SERPL-MCNC: 20 MG/DL (ref 6–23)
CALCIUM SERPL-MCNC: 8.8 MG/DL (ref 8.6–10.3)
CHLORIDE SERPL-SCNC: 113 MMOL/L (ref 98–107)
CHOLEST SERPL-MCNC: 98 MG/DL (ref 0–199)
CHOLESTEROL/HDL RATIO: 2.5
CO2 SERPL-SCNC: 24 MMOL/L (ref 21–32)
CREAT SERPL-MCNC: 1.07 MG/DL (ref 0.5–1.05)
EGFRCR SERPLBLD CKD-EPI 2021: 60 ML/MIN/1.73M*2
EOSINOPHIL # BLD AUTO: 0.09 X10*3/UL (ref 0–0.7)
EOSINOPHIL NFR BLD AUTO: 1.2 %
ERYTHROCYTE [DISTWIDTH] IN BLOOD BY AUTOMATED COUNT: 13.6 % (ref 11.5–14.5)
EST. AVERAGE GLUCOSE BLD GHB EST-MCNC: 123 MG/DL
GLUCOSE SERPL-MCNC: 91 MG/DL (ref 74–99)
HBA1C MFR BLD: 5.9 %
HCT VFR BLD AUTO: 38.8 % (ref 36–46)
HDLC SERPL-MCNC: 38.7 MG/DL
HGB BLD-MCNC: 12.5 G/DL (ref 12–16)
IMM GRANULOCYTES # BLD AUTO: 0.03 X10*3/UL (ref 0–0.7)
IMM GRANULOCYTES NFR BLD AUTO: 0.4 % (ref 0–0.9)
LDLC SERPL CALC-MCNC: 29 MG/DL
LYMPHOCYTES # BLD AUTO: 3.2 X10*3/UL (ref 1.2–4.8)
LYMPHOCYTES NFR BLD AUTO: 41.6 %
MCH RBC QN AUTO: 30.2 PG (ref 26–34)
MCHC RBC AUTO-ENTMCNC: 32.2 G/DL (ref 32–36)
MCV RBC AUTO: 94 FL (ref 80–100)
MONOCYTES # BLD AUTO: 0.45 X10*3/UL (ref 0.1–1)
MONOCYTES NFR BLD AUTO: 5.9 %
NEUTROPHILS # BLD AUTO: 3.9 X10*3/UL (ref 1.2–7.7)
NEUTROPHILS NFR BLD AUTO: 50.6 %
NON HDL CHOLESTEROL: 59 MG/DL (ref 0–149)
NRBC BLD-RTO: 0 /100 WBCS (ref 0–0)
PHOSPHATE SERPL-MCNC: 4 MG/DL (ref 2.5–4.9)
PLATELET # BLD AUTO: 187 X10*3/UL (ref 150–450)
POTASSIUM SERPL-SCNC: 4.3 MMOL/L (ref 3.5–5.3)
PROT SERPL-MCNC: 6.7 G/DL (ref 6.4–8.2)
RBC # BLD AUTO: 4.14 X10*6/UL (ref 4–5.2)
SODIUM SERPL-SCNC: 144 MMOL/L (ref 136–145)
TRIGL SERPL-MCNC: 151 MG/DL (ref 0–149)
TSH SERPL-ACNC: 0.66 MIU/L (ref 0.44–3.98)
VLDL: 30 MG/DL (ref 0–40)
WBC # BLD AUTO: 7.7 X10*3/UL (ref 4.4–11.3)

## 2025-01-23 PROCEDURE — 84100 ASSAY OF PHOSPHORUS: CPT

## 2025-01-23 PROCEDURE — 80053 COMPREHEN METABOLIC PANEL: CPT

## 2025-01-23 PROCEDURE — 82306 VITAMIN D 25 HYDROXY: CPT

## 2025-01-23 PROCEDURE — 85025 COMPLETE CBC W/AUTO DIFF WBC: CPT

## 2025-01-23 PROCEDURE — 83036 HEMOGLOBIN GLYCOSYLATED A1C: CPT

## 2025-01-23 PROCEDURE — 80061 LIPID PANEL: CPT

## 2025-01-23 PROCEDURE — 84443 ASSAY THYROID STIM HORMONE: CPT

## 2025-01-24 NOTE — RESULT ENCOUNTER NOTE
Please remind patient to keep her scheduled appointment for next month and we can review her recent blood work results together at that time.

## 2025-01-25 DIAGNOSIS — I10 PRIMARY HYPERTENSION: ICD-10-CM

## 2025-01-25 DIAGNOSIS — E11.9 TYPE 2 DIABETES MELLITUS WITHOUT COMPLICATION, UNSPECIFIED WHETHER LONG TERM INSULIN USE (MULTI): ICD-10-CM

## 2025-01-27 RX ORDER — POTASSIUM CHLORIDE 20 MEQ/1
TABLET, EXTENDED RELEASE ORAL
Qty: 90 TABLET | Refills: 0 | Status: SHIPPED | OUTPATIENT
Start: 2025-01-27

## 2025-01-27 RX ORDER — METFORMIN HYDROCHLORIDE 500 MG/1
500 TABLET ORAL 2 TIMES DAILY
Qty: 90 TABLET | Refills: 2 | Status: SHIPPED | OUTPATIENT
Start: 2025-01-27

## 2025-02-04 ENCOUNTER — APPOINTMENT (OUTPATIENT)
Dept: CARDIOLOGY | Facility: CLINIC | Age: 60
End: 2025-02-04
Payer: COMMERCIAL

## 2025-02-12 ENCOUNTER — EVALUATION (OUTPATIENT)
Dept: PHYSICAL THERAPY | Facility: CLINIC | Age: 60
End: 2025-02-12
Payer: COMMERCIAL

## 2025-02-12 DIAGNOSIS — M25.551 CHRONIC RIGHT HIP PAIN: ICD-10-CM

## 2025-02-12 DIAGNOSIS — M43.16 SPONDYLOLISTHESIS AT L4-L5 LEVEL: Primary | ICD-10-CM

## 2025-02-12 DIAGNOSIS — G89.29 CHRONIC RIGHT HIP PAIN: ICD-10-CM

## 2025-02-12 PROCEDURE — 97161 PT EVAL LOW COMPLEX 20 MIN: CPT | Mod: GP

## 2025-02-12 PROCEDURE — 97110 THERAPEUTIC EXERCISES: CPT | Mod: GP

## 2025-02-12 NOTE — PROGRESS NOTES
Physical Therapy    Physical Therapy Evaluation    Patient Name: Joselin Dunn  MRN: [id number]  Today's Date: 2025   confirmed verbally by patient.    Time Entry:   Time Calculation  Start Time: 1530  Stop Time: 1610  Time Calculation (min): 40 min  PT Evaluation Time Entry  PT Evaluation (Low) Time Entry: 20  PT Therapeutic Procedures Time Entry  Therapeutic Exercise Time Entry: 20         Reason for Visit  Referred by: Manuel Mayen   Referral DX:  Right low back pain, unspecified chronicity, unspecified whether sciatica present [M54.50]     Insurance:  Visit: #1  Authorized: to be determined  Payor/Plan: [facility]       Current Problem  1. Spondylolisthesis at L4-L5 level        2. Chronic right hip pain            Subjective   Date of Onset: 4-6 months ago   Chief Complaint: Right hip and LBP     Patient presents to physical therapy 15 minutes late to evaluation with complaints of right sided hip and lower back pain with associated ERNESTO (denies trauma, falls, MVA). She confirms that when she was cleaning pots and pans in the kitchen there was a moment when turning to the right to reach for something, felt immediate pain to the current location of the right hip/low back. Patient had follow-up with doctor who utilized imaging to and examination to note that the symptoms may be result of dysfunction in the lumbosacral spine.       Patient reports that she does not leave the house often, having her necessary groceries delivered to her home. Does not drive.         RED FLAGS:  Saddle anesthesia? = no  Changes in bowel/bladder? = no  Recent weight loss not purposeful? = no  Bilateral numbness/tingling? = no        Pain:  Patient denies any numbness/tingling down the legs without referred pain elsewhere in the lower extremities.   Pain is worse mostly when she is transitioning from a sitting to standing position, until she is able to begin walking the pain reduces to near elimination.   Worse =  10/10  Best = 1-2/10  Daily is mostly a constant aching sensation (2-3/10)              Recent Imaging:  XR of lumbar spine (1/13/25)  AP lateral flexion extension x-rays of the lumbar spine shows mild degenerative changes with some endplate irregularity.   There is a spondylolisthesis at L4-5 there is no spondylolysis.   There is no fractures. Lumbar lordosis is maintained.   Range of motion with flexion extension is preserved. There is no scoliosis.   Surgical clips are present consistent with cholecystectomy.   Bony pelvis and hips are partially visualized and show minimal bilateral hip degenerative changes.      XR of right hip (12/17/24)  Mild degenerative changes in the right hip        Reviewed medical screening history form with patient: Yes,        Barriers Impacting Care:  Transportation   Requires use of 3rd party vehicle to transport patient from home to physical therapy appointments    Poor health literacy   Patient requires additional support in understanding health information       Precautions:   none        Objective  - limited in measurements due to patient reporting 15 minutes late to appointment   Observation  Static sitting = posterior pelvic tilt with mod rounded shoulders        RANGE OF MOTION  Lumbar AROM  Flexion = fingers to ankles with knee flexion compensation   Extension = not tested   Side bending   (L) =  fingers to mid thigh   (R) =  fingers to mid thigh       Repeated Motions  Flexion: No change      Hip ROM  Flexion =   (L)  WFL A, WNL P with soft end-feel   (R)  WFL A, WNL P with soft end-feel   Patient notes immediate reduction in pain levels with PROM into flexion   IR  (L) =  30 A  (R) =  25 A   ER  (L) =  25 A,  P with muscular end-feel   (R) =  20 A,  P with muscular end-feel      STRENGTH   Hip   Flexion =   (L) =  4/5   (R) =  4-/5   IR  (L) =  3+/5  (R) =  3+/5  ER  (L) =  4-/5  (R) = 4-/5  Abduction (group)  4/5 with reproduction of right PSIS pain   Adduction (group)     4+/5          SPECIAL TESTS  LBP  (-) SLR    Hip   (+) Scour         FLEXIBILITY  Hamstring   L = negative  R = negative          Palpation:   TTP = R PSIS and right gluteus medius mm belly           Outcome Measure   JASMIN =   Phq-9=         Treatment   Patient education   POC for current treatment  Prognosis and rehabilitation / recovery timeline  TA anatomy and role as it pertains to low back pain and spine stability   Spondylolisthesis anatomy and etiology     Therapeutic Exercise (Access Code: [email or web address])  - Side Stepping with Resistance at Ankles  - 1 x daily - 7 x weekly - 3 sets - 30 seconds hold  - Standing Marching  - 1 x daily - 7 x weekly - 2 sets - 15 reps  - Seated Lumbar Flexion Stretch  - 1 x daily - 7 x weekly - 1 sets - 15 reps      Assessment  PT Diagnosis: Patient presents with signs and symptoms consistent with right sided PSIS and low back pain with secondary to limitations hip ROM and strength. Patient's pain reproduced with localized palpation and active contraction of the proximal hip rotators and abductors. Interventions in session include patient education regarding injury and likely etiology, POC, and HEP as listed above with agreeance and understanding. Will perform additional examination assessments next session due to limited time with objective examination. Skilled PT required to return to prior level of function and maximize functional outcome. Likely to benefit from skilled care.    Plan  Therapeutic exercises to improve hip and thoracolumbar ROM and strength of proximal hip and trunk musculature; neuromuscular re-education to promote proper engagement of proximal hip and trunk musculature; manual therapy for joint mobility and soft tissue tightness; therapeutic activity to promote return to prior level of function; biofeedback; gait training; dry needling; taping; cold/cryotherapy; hot packs; aquatic therapy; electrical stimulation; Patient plan will consist of 1 days/week for 6  weeks.    Next Visit Plan:  Assess patient response to initial HEP   Continue to work on improving deep abdominal activation in supine with use of blood pressure cuff (external feedback)  Improve hip ROM and strength   Assess 5x STS and balance       Goals:  Patient will be independent with HEP.  Patient will report improvement in JASMIN outcome measure in 4 weeks.   Patient will demonstrate WFL AROM/PROM of the hip and lumbar spine with pain <3/10 in 4 weeks for return to prior level of function.   Patient will demonstrate full AROM/PROM of the hip and lumbar spine without pain in 6 weeks for return to prior level of function.   Patient will demonstrate at least 4-/5 proximal hip MMT in 4 weeks to improve strength of hip and trunk.   Patient will demonstrate at least 4/5 proximal hip MMT in 6 weeks to improve strength of hip and trunk.   Patient will report no pain when performing sit to stands 100% of the time in 6 weeks.         Isra Tarvis PT, DPT

## 2025-02-18 ENCOUNTER — APPOINTMENT (OUTPATIENT)
Dept: PRIMARY CARE | Facility: CLINIC | Age: 60
End: 2025-02-18
Payer: COMMERCIAL

## 2025-02-18 VITALS
TEMPERATURE: 97.6 F | HEART RATE: 64 BPM | SYSTOLIC BLOOD PRESSURE: 132 MMHG | WEIGHT: 237.8 LBS | DIASTOLIC BLOOD PRESSURE: 86 MMHG | BODY MASS INDEX: 43.49 KG/M2 | OXYGEN SATURATION: 99 %

## 2025-02-18 DIAGNOSIS — N39.46 MIXED STRESS AND URGE URINARY INCONTINENCE: ICD-10-CM

## 2025-02-18 DIAGNOSIS — E55.9 VITAMIN D INSUFFICIENCY: ICD-10-CM

## 2025-02-18 DIAGNOSIS — K21.9 GASTROESOPHAGEAL REFLUX DISEASE WITHOUT ESOPHAGITIS: ICD-10-CM

## 2025-02-18 DIAGNOSIS — E11.9 TYPE 2 DIABETES MELLITUS WITHOUT COMPLICATION, WITHOUT LONG-TERM CURRENT USE OF INSULIN (MULTI): Primary | ICD-10-CM

## 2025-02-18 DIAGNOSIS — E05.80 IATROGENIC HYPERTHYROIDISM: ICD-10-CM

## 2025-02-18 DIAGNOSIS — I10 PRIMARY HYPERTENSION: ICD-10-CM

## 2025-02-18 DIAGNOSIS — Z71.2 ENCOUNTER TO DISCUSS TEST RESULTS: ICD-10-CM

## 2025-02-18 PROBLEM — R73.01 IFG (IMPAIRED FASTING GLUCOSE): Status: ACTIVE | Noted: 2025-02-18

## 2025-02-18 PROCEDURE — 1036F TOBACCO NON-USER: CPT | Performed by: FAMILY MEDICINE

## 2025-02-18 PROCEDURE — 3075F SYST BP GE 130 - 139MM HG: CPT | Performed by: FAMILY MEDICINE

## 2025-02-18 PROCEDURE — 4010F ACE/ARB THERAPY RXD/TAKEN: CPT | Performed by: FAMILY MEDICINE

## 2025-02-18 PROCEDURE — 3079F DIAST BP 80-89 MM HG: CPT | Performed by: FAMILY MEDICINE

## 2025-02-18 PROCEDURE — 99214 OFFICE O/P EST MOD 30 MIN: CPT | Performed by: FAMILY MEDICINE

## 2025-02-18 PROCEDURE — 3048F LDL-C <100 MG/DL: CPT | Performed by: FAMILY MEDICINE

## 2025-02-18 PROCEDURE — 3044F HG A1C LEVEL LT 7.0%: CPT | Performed by: FAMILY MEDICINE

## 2025-02-18 RX ORDER — ACETAMINOPHEN 500 MG
2000 TABLET ORAL DAILY
Qty: 90 CAPSULE | Refills: 3 | Status: SHIPPED | OUTPATIENT
Start: 2025-02-18

## 2025-02-18 RX ORDER — LISINOPRIL 20 MG/1
TABLET ORAL
Qty: 90 TABLET | Refills: 1 | Status: SHIPPED | OUTPATIENT
Start: 2025-02-18

## 2025-02-18 RX ORDER — OXYBUTYNIN CHLORIDE 5 MG/1
5 TABLET ORAL 2 TIMES DAILY
Qty: 180 TABLET | Refills: 1 | Status: SHIPPED | OUTPATIENT
Start: 2025-02-18

## 2025-02-18 RX ORDER — CARVEDILOL 25 MG/1
37.5 TABLET ORAL 2 TIMES DAILY
Qty: 270 TABLET | Refills: 1 | Status: SHIPPED | OUTPATIENT
Start: 2025-02-18

## 2025-02-18 RX ORDER — OMEPRAZOLE 40 MG/1
40 CAPSULE, DELAYED RELEASE ORAL
Qty: 90 CAPSULE | Refills: 1 | Status: SHIPPED | OUTPATIENT
Start: 2025-02-18

## 2025-02-18 RX ORDER — LEVOTHYROXINE SODIUM 112 UG/1
112 TABLET ORAL DAILY
Qty: 90 TABLET | Refills: 1 | Status: SHIPPED | OUTPATIENT
Start: 2025-02-18

## 2025-02-18 ASSESSMENT — PATIENT HEALTH QUESTIONNAIRE - PHQ9
1. LITTLE INTEREST OR PLEASURE IN DOING THINGS: NOT AT ALL
SUM OF ALL RESPONSES TO PHQ9 QUESTIONS 1 AND 2: 0
2. FEELING DOWN, DEPRESSED OR HOPELESS: NOT AT ALL

## 2025-02-18 NOTE — PROGRESS NOTES
"Subjective   Chief complaint: Joselin Dunn is a 59 y.o. female who presents for Follow-up (Patient is here today for a 4 month follow up).    HPI:  Here for a follow up appointment.    Has been using the Trulicity and feels she is \"doing well\" with it.  She stays on top of her medications.  Gets frustrated with her weight.  Has been trying to make healthier choices; i.e. fruit slices instead of other snacks.  For a short time was doing chair exercises every night.    Recently had labs done and would like to review results together.    Sees Dr. Uribe, her nephrologist, yearly for diabetic nephropathy.  Has had some labs ordered by him.    Would like medication refills today.  Reports she is up needing to urinate at night and would like to try something to help with urinary frequency.  No dysuria.  No urgency.  No concerns during daytime hours.                Objective   /86 (BP Location: Right arm, Patient Position: Sitting, BP Cuff Size: Large adult)   Pulse 64   Temp 36.4 °C (97.6 °F) (Temporal)   Wt 108 kg (237 lb 12.8 oz)   LMP  (LMP Unknown)   SpO2 99% Comment: RA  BMI 43.49 kg/m²   Physical Exam  General:  Alert, oriented, no acute distress  Eyes:  Sclerae white, PER, conjunctivae clear  ENT:  No nasal congestion.    Neck: Supple  Endocrine:  HgbA1c was 5.9%.  Respiratory:  No dyspnea.  Cardiovascular:  S1 and S2 positive.    Vascular:  No edema.  Skin warm and dry.  CNS:  No gross neurological deficits.  Gait within normal limits.    Psychiatric:  Affect is positive and appropriate.  No depression.  No anxiety.    Review of Systems   I have reviewed and reconciled the medication list with the patient today.   Current Outpatient Medications:     blood sugar diagnostic (True Metrix Glucose Test Strip) strip, 1 strip by subdermal route 2 times a day., Disp: 100 strip, Rfl: 3    buPROPion XL (Wellbutrin XL) 300 mg 24 hr tablet, Take 1 tablet (300 mg) by mouth once daily in the morning. Take before " meals., Disp: , Rfl:     busPIRone (Buspar) 10 mg tablet, Take 1 tablet (10 mg) by mouth 3 times a day., Disp: , Rfl:     dilTIAZem ER (Tiazac) 120 mg 24 hr capsule, Take 1 capsule (120 mg) by mouth once daily., Disp: , Rfl:     dulaglutide (Trulicity) 0.75 mg/0.5 mL pen injector, Inject 0.75 mg under the skin 1 (one) time per week., Disp: 7 mL, Rfl: 1    metFORMIN (Glucophage) 500 mg tablet, Take 1 tablet by mouth twice daily., Disp: 90 tablet, Rfl: 2    potassium chloride CR 20 mEq ER tablet, Take 1 tablet by mouth daily at the same time., Disp: 90 tablet, Rfl: 0    rosuvastatin (Crestor) 20 mg tablet, Take 1 tablet by mouth daily at the same time., Disp: 90 tablet, Rfl: 2    Trintellix 10 mg tablet tablet, Take 1 tablet (10 mg) by mouth once daily., Disp: , Rfl:     carvedilol (Coreg) 25 mg tablet, Take 1.5 tablets (37.5 mg) by mouth 2 times a day., Disp: 270 tablet, Rfl: 1    cholecalciferol (Vitamin D3) 50 mcg (2,000 unit) capsule, Take 1 capsule (50 mcg) by mouth once daily., Disp: 90 capsule, Rfl: 3    levothyroxine (Synthroid, Levoxyl) 112 mcg tablet, Take 1 tablet (112 mcg) by mouth early in the morning.. Take on an empty stomach at the same time each day, either 30 to 60 minutes prior to breakfast, Disp: 90 tablet, Rfl: 1    lisinopril 20 mg tablet, Take 1 tablet by mouth daily at the same time., Disp: 90 tablet, Rfl: 1    omeprazole (PriLOSEC) 40 mg DR capsule, Take 1 capsule (40 mg) by mouth once daily in the morning. Take before meals., Disp: 90 capsule, Rfl: 1    oxybutynin (Ditropan) 5 mg tablet, Take 1 tablet (5 mg) by mouth 2 times a day., Disp: 180 tablet, Rfl: 1     Imaging:  No results found.     Labs reviewed:    Lab Results   Component Value Date    WBC 7.7 01/23/2025    HGB 12.5 01/23/2025    HCT 38.8 01/23/2025     01/23/2025    CHOL 98 01/23/2025    TRIG 151 (H) 01/23/2025    HDL 38.7 01/23/2025    ALT 32 01/23/2025    AST 20 01/23/2025     01/23/2025    K 4.3 01/23/2025    CL  113 (H) 01/23/2025    CREATININE 1.07 (H) 01/23/2025    BUN 20 01/23/2025    CO2 24 01/23/2025    TSH 0.66 01/23/2025    HGBA1C 5.9 (H) 01/23/2025       Assessment/Plan   Problem List Items Addressed This Visit       Encounter to discuss test results     Reviewed lab/testing results with the patient and provided patient education regarding the results.           Primary hypertension     Acceptable.  No change in management at this time.         Relevant Medications    carvedilol (Coreg) 25 mg tablet    lisinopril 20 mg tablet    Type 2 diabetes mellitus with diabetic nephropathy - Primary     >>ASSESSMENT AND PLAN FOR TYPE 2 DIABETES MELLITUS WITH DIABETIC NEPHROPATHY WRITTEN ON 2/20/2025 10:05 AM BY VIVIAN MOMIN MD    Continues with nephrology regularly.  Diabetes is controlled.  HgbA1c is 5.9%.    >>ASSESSMENT AND PLAN FOR IFG (IMPAIRED FASTING GLUCOSE) WRITTEN ON 2/18/2025  3:17 PM BY VIVIAN MOMIN MD    A1c is 5.9%.  Continue present management.          Vitamin D insufficiency    Relevant Medications    cholecalciferol (Vitamin D3) 50 mcg (2,000 unit) capsule     Other Visit Diagnoses       Iatrogenic hyperthyroidism        Relevant Medications    levothyroxine (Synthroid, Levoxyl) 112 mcg tablet    Mixed stress and urge urinary incontinence        Relevant Medications    oxybutynin (Ditropan) 5 mg tablet    Gastroesophageal reflux disease without esophagitis        Relevant Medications    omeprazole (PriLOSEC) 40 mg DR capsule            Continue current medications as listed  Follow up in 6 months.  Sooner if needed.

## 2025-02-20 PROBLEM — E11.21 TYPE 2 DIABETES MELLITUS WITH DIABETIC NEPHROPATHY: Status: ACTIVE | Noted: 2024-01-29

## 2025-02-20 PROBLEM — R09.81 NASAL CONGESTION: Status: RESOLVED | Noted: 2024-09-11 | Resolved: 2025-02-20

## 2025-02-20 PROBLEM — R50.9 FEVER: Status: RESOLVED | Noted: 2024-09-11 | Resolved: 2025-02-20

## 2025-02-20 NOTE — ASSESSMENT & PLAN NOTE
>>ASSESSMENT AND PLAN FOR TYPE 2 DIABETES MELLITUS WITH DIABETIC NEPHROPATHY WRITTEN ON 2/20/2025 10:05 AM BY VIVIAN MOMIN MD    Continues with nephrology regularly.  Diabetes is controlled.  HgbA1c is 5.9%.    >>ASSESSMENT AND PLAN FOR IFG (IMPAIRED FASTING GLUCOSE) WRITTEN ON 2/18/2025  3:17 PM BY VIVIAN MOMIN MD    A1c is 5.9%.  Continue present management.

## 2025-02-28 ENCOUNTER — APPOINTMENT (OUTPATIENT)
Dept: ORTHOPEDIC SURGERY | Facility: CLINIC | Age: 60
End: 2025-02-28
Payer: COMMERCIAL

## 2025-02-28 DIAGNOSIS — S39.012D STRAIN OF LUMBAR REGION, SUBSEQUENT ENCOUNTER: Primary | ICD-10-CM

## 2025-02-28 PROBLEM — J06.9 VIRAL UPPER RESPIRATORY TRACT INFECTION: Status: RESOLVED | Noted: 2024-09-11 | Resolved: 2025-02-28

## 2025-02-28 NOTE — PROGRESS NOTES
Joselin Dunn is a 59 y.o. female who presents for Follow-up of the Lower Back (S/p PT).    HPI:  59-year-old female here for follow-up low back after physical therapy.  She denies any fever chills nausea vomiting night sweats.  She has no bowel or bladder complaints.    Physical exam:  Well-nourished, well kept.No lymphangitis or lymphadenopathy in the examined extremities.  Gait normal.  Can stand on heels and toes.   Examination of the back shows no tenderness in the paraspinous musculature.  There is no significant decreased range of motion in all directions due to guarding/muscle spasms and pain at extremes.  There is good strength and no instability.  Examination of the lower extremities reveals no point tenderness, swelling, or deformity.  Range of motion of the hips, knees, and ankles are full without crepitance, instability, or exacerbation of pain.  Strength is 5/5 throughout.  No redness, abrasions, or lesions on extremities  Gross sensation intact in the extremities.  Affect normal.  Alert and oriented ×3.  Coordination normal.    Assessment:  59-year-old female here for follow-up low back pain after physical therapy.  She had 1 session so far physical therapy, she is scheduled to go at least once a week in the near future.  She feels like her first visit of physical therapy helped her quite a bit.  She is not having any pain in her back at this visit.  She is not describing any hip pain or other radicular symptoms.  She was doing well and she is happy where she is at pain wise.    Plan:  She is engaging in activities as tolerated, she can continue with physical therapy.  I will see her back on an as-needed basis.  If at some point her symptoms change or new symptoms develop I be happy to see her back and we can work this up further potentially with an MRI.    Subjective

## 2025-03-03 ENCOUNTER — APPOINTMENT (OUTPATIENT)
Dept: OBSTETRICS AND GYNECOLOGY | Facility: CLINIC | Age: 60
End: 2025-03-03
Payer: COMMERCIAL

## 2025-03-03 VITALS
BODY MASS INDEX: 44.09 KG/M2 | SYSTOLIC BLOOD PRESSURE: 102 MMHG | HEIGHT: 62 IN | DIASTOLIC BLOOD PRESSURE: 66 MMHG | WEIGHT: 239.6 LBS

## 2025-03-03 DIAGNOSIS — D06.9 HIGH GRADE SQUAMOUS INTRAEPITHELIAL LESION (HGSIL), GRADE 3 CIN, ON BIOPSY OF CERVIX: ICD-10-CM

## 2025-03-03 DIAGNOSIS — R87.810 HUMAN PAPILLOMAVIRUS (HPV) TYPE 16 DNA DETECTED IN CERVICAL SPECIMEN: Primary | ICD-10-CM

## 2025-03-03 PROCEDURE — 3074F SYST BP LT 130 MM HG: CPT | Performed by: OBSTETRICS & GYNECOLOGY

## 2025-03-03 PROCEDURE — 99213 OFFICE O/P EST LOW 20 MIN: CPT | Performed by: OBSTETRICS & GYNECOLOGY

## 2025-03-03 PROCEDURE — 3078F DIAST BP <80 MM HG: CPT | Performed by: OBSTETRICS & GYNECOLOGY

## 2025-03-03 PROCEDURE — 1036F TOBACCO NON-USER: CPT | Performed by: OBSTETRICS & GYNECOLOGY

## 2025-03-03 PROCEDURE — 3048F LDL-C <100 MG/DL: CPT | Performed by: OBSTETRICS & GYNECOLOGY

## 2025-03-03 PROCEDURE — 3044F HG A1C LEVEL LT 7.0%: CPT | Performed by: OBSTETRICS & GYNECOLOGY

## 2025-03-03 PROCEDURE — 3008F BODY MASS INDEX DOCD: CPT | Performed by: OBSTETRICS & GYNECOLOGY

## 2025-03-03 PROCEDURE — 87624 HPV HI-RISK TYP POOLED RSLT: CPT

## 2025-03-03 PROCEDURE — 4010F ACE/ARB THERAPY RXD/TAKEN: CPT | Performed by: OBSTETRICS & GYNECOLOGY

## 2025-03-03 NOTE — PROGRESS NOTES
"GYNECOLOGY PROGRESS NOTE        CC:     Chief Complaint   Patient presents with    Follow-up     Est patient - here today for repeat pap after [facility] biopsy   Chaperone betty schultz ma          HPI:  Joselin Dunn scheduled today for repeat pap smear after treatment of HGSIL.    Patient has no AUB or abnormal discharge.  No new GYN complaints      ROS:  GYN - see HPI      HISTORY:  Past Surgical History:   Procedure Laterality Date    ANKLE SURGERY  10/29/2021    Ankle Surgery    CATARACT EXTRACTION      CERVICAL CONIZATION   W/ LASER  2024    CHOLECYSTECTOMY  2016    Cholecystectomy    COLONOSCOPY W/ POLYPECTOMY      COLPOSCOPY  2023    wnl    HERNIA REPAIR      umbilical    SHOULDER SURGERY      TOTAL KNEE ARTHROPLASTY Bilateral 2016    Knee Replacement    TUBAL LIGATION       Social History     Tobacco Use    Smoking status: Former     Current packs/day: 0.00     Types: Cigarettes     Quit date: 2020     Years since quittin.1    Smokeless tobacco: Former    Tobacco comments:     Very bad habit   Vaping Use    Vaping status: Never Used   Substance Use Topics    Alcohol use: Not Currently     Comment: Rarely drink alcohol    Drug use: Not Currently     Types: Marijuana     Comment: Stopped at 21          PHYSICAL EXAM:  /66 (BP Location: Left arm, Patient Position: Sitting)   Ht 1.575 m (5' 2\")   Wt 109 kg (239 lb 9.6 oz)   LMP  (LMP Unknown)   BMI 43.82 kg/m²   GEN:  A&O, NAD  :  normal urethra, no bladder TTP  GYN:  normal vulva and perineum w/o lesions or ulcers, normal vagina without discharge or lesions, normal cervix without lesions or discharge or CMT--well-healed but distorted appearance of cervix from scarring due to CKC  PSYCH:  normal affect, non-anxious      PATH RESULTS:  -:   CKC=normal  -:  COLPO=focal HGSIL/CIN3 (on ECC sampling only)  -:  repeat pap=normal, + HPV 16  -:  COLPO=wnl  -:  pap=normal, + HPV " 16      IMPRESSION/PLAN:  Problem List Items Addressed This Visit       High grade squamous intraepithelial lesion (HGSIL), grade 3 ARIELA, on biopsy of cervix    Relevant Orders    THINPREP PAP TEST    Human papillomavirus (HPV) type 16 DNA detected in cervical specimen - Primary        HGSIL/HPV16+:   S/P CKC for HGSIL/ARIELA 3 (focal HGSIL on ECC from COLPO), CKC PATH=normal.  Declined opted for surveillance after CKC and declined hysterectomy.  Preceding pap:  normal, + HPV 16.  Per ASCCP guidelines post-LEEP/CKC surveillance plan is repeat Pap/HPV testing at 6 months.  Repeat pap/HPV done today.  Smoking cessation:  no.  Gardasil vaccination:  n/a (>45).      F/U 1 year for annual examination with repeat Pap/HPV testing.      Jomar Motta DO

## 2025-03-04 ENCOUNTER — OFFICE VISIT (OUTPATIENT)
Dept: ORTHOPEDIC SURGERY | Facility: CLINIC | Age: 60
End: 2025-03-04
Payer: COMMERCIAL

## 2025-03-04 DIAGNOSIS — M19.049 CMC ARTHRITIS: Primary | ICD-10-CM

## 2025-03-04 PROCEDURE — 4010F ACE/ARB THERAPY RXD/TAKEN: CPT | Performed by: ORTHOPAEDIC SURGERY

## 2025-03-04 PROCEDURE — 2500000004 HC RX 250 GENERAL PHARMACY W/ HCPCS (ALT 636 FOR OP/ED): Performed by: ORTHOPAEDIC SURGERY

## 2025-03-04 PROCEDURE — 20600 DRAIN/INJ JOINT/BURSA W/O US: CPT | Performed by: ORTHOPAEDIC SURGERY

## 2025-03-04 PROCEDURE — 3044F HG A1C LEVEL LT 7.0%: CPT | Performed by: ORTHOPAEDIC SURGERY

## 2025-03-04 PROCEDURE — 99214 OFFICE O/P EST MOD 30 MIN: CPT | Performed by: ORTHOPAEDIC SURGERY

## 2025-03-04 PROCEDURE — 20600 DRAIN/INJ JOINT/BURSA W/O US: CPT | Mod: LT | Performed by: ORTHOPAEDIC SURGERY

## 2025-03-04 PROCEDURE — 3048F LDL-C <100 MG/DL: CPT | Performed by: ORTHOPAEDIC SURGERY

## 2025-03-04 PROCEDURE — 1036F TOBACCO NON-USER: CPT | Performed by: ORTHOPAEDIC SURGERY

## 2025-03-04 RX ORDER — LIDOCAINE HYDROCHLORIDE 10 MG/ML
0.5 INJECTION, SOLUTION INFILTRATION; PERINEURAL
Status: COMPLETED | OUTPATIENT
Start: 2025-03-04 | End: 2025-03-04

## 2025-03-04 RX ADMIN — TRIAMCINOLONE ACETONIDE 5 MG: 10 INJECTION, SUSPENSION INTRA-ARTICULAR; INTRALESIONAL at 13:37

## 2025-03-04 RX ADMIN — LIDOCAINE HYDROCHLORIDE 0.5 ML: 10 INJECTION, SOLUTION INFILTRATION; PERINEURAL at 13:37

## 2025-03-04 NOTE — PROGRESS NOTES
3/4/2025    Chief Complaint   Patient presents with    Left Thumb - Osteoarthritis     Cmc arthritis s/p inj 12/03/24    Right Thumb - Osteoarthritis       History of Present Illness:  Patient Joselin Dunn , 59 y.o. female, presents today, 3/4/2025, for evaluation of bilateral thumb  CMC arthritis.  She is 3 months out from previous injections.  She states that the right side is doing great and completely pain-free at this stage.  She states that symptoms on the left just started to recur about 3 or 4 days ago.  No new injury or trauma.  She is requesting repeat injection on the left side .         Review of Systems:   GENERAL: Negative  GI: Negative  MUSCULOSKELETAL: See HPI  SKIN: Negative  NEURO:  Negative     Physical Exam:  GENERAL:  Alert and oriented to person, place, and time.  No acute distress and breathing comfortably; pleasant and cooperative with the examination.  HEENT:  Head is normocephalic and atraumatic.  NECK:  Supple, no visible swelling.  CARDIOVASCULAR:  No palpable tachycardia.  LUNGS:  No audible wheezing or labored breathing.  ABDOMEN:  Nondistended.  Extremities: Evaluation of left upper extremity finds the patient to have a palpable radial artery at the wrist with brisk capillary refill to all digits. The patient has intact sensorium to axillary, radial, median and ulnar nerves. There are no open wounds. There are no signs of infection. There is no evidence of lymphedema or lymphatic streaking. The patient has supple compartments of the left arm, forearm and hand.  Tender to palpation over the left thumb CMC with positive basilar grind test.     Imaging/Test Results:  None today.     Assessment:  Bilateral thumb CMC arthritis, doing well on the right 3 months out from injection with recurrence of pain on the left.     Plan:  Treatment options reviewed including operative and nonoperative strategies.  Patient strong preference for continued nonoperative management and repeat  injection on the left.  This performed office today by Dr. Van and tolerated well.  Continue with weightbearing tibias as tolerated.  Follow-up in 3 months or on as-needed basis as symptoms dictate.  All questions answered today's visit.        Hand / UE Inj/Asp: L thumb CMC for osteoarthritis on 3/4/2025 1:37 PM  Indications: pain  Details: 25 G needle, dorsal approach  Medications: 5 mg triamcinolone acetonide 10 mg/mL; 0.5 mL lidocaine 10 mg/mL (1 %)  Outcome: tolerated well, no immediate complications    Left Thumb Carpometacarpal Joint Injection: It was explained to the patient that the risks of a steroid injection include but are not limited to infection, local skin irritation, skin atrophy, calcification, continued pain and discomfort, elevated blood sugar, burning, failure to relieve pain, and possible late infection. The patient verbalized good insight and verbalized consent for the injection. It was further explained that the postinjection discomfort can be alleviated with additional medications, ice, elevation, and rest over the first 24 hours, and that these modalities are recommended.    Using aseptic technique, a solution containing 0.5 cc of 5 mg of Kenalog and 0.5 mL of 1% lidocaine without epinephrine was injected intraarticularly to the left thumb carpometacarpal joint. Digital palpation was used to localize the CMC articulation about the dorsal radial aspect of the joint. Gentle traction was then imparted to the thumb distally and a 25-gauge needle was advanced through the skin, subcutaneous tissue and capsule. The injection was then administered and the patient tolerated the injection well. A band-aid was then placed. It should be noted that ethyl chloride spray was used to make the injection delivery more comfortable for the patient.  Procedure, treatment alternatives, risks and benefits explained, specific risks discussed. Consent was given by the patient. Immediately prior to procedure a  time out was called to verify the correct patient, procedure, equipment, support staff and site/side marked as required. Patient was prepped and draped in the usual sterile fashion.         In a face to face encounter, I performed a history and physical examination, discussed pertinent diagnostic studies if indicated, and discussed diagnosis and management strategies with both the patient and the mid-level provider. I reviewed the mid-level's note and agree with the documented findings and plan of care.  Patient presents today for evaluation of painful left thumb CMC arthritis.  Previous steroid injections helped.  Right side continues to have nice relief.  Pain has returned on the left.  Focal tenderness to left thumb at CMC articulation.  Treatment options were discussed.  We talked about operative and nonoperative strategies.  Patient elects to proceed forth with left thumb CMC joint injection.  3-month follow-up.  No x-rays upon return.

## 2025-03-05 ENCOUNTER — APPOINTMENT (OUTPATIENT)
Dept: PHYSICAL THERAPY | Facility: CLINIC | Age: 60
End: 2025-03-05
Payer: COMMERCIAL

## 2025-03-11 ENCOUNTER — TREATMENT (OUTPATIENT)
Dept: PHYSICAL THERAPY | Facility: CLINIC | Age: 60
End: 2025-03-11
Payer: COMMERCIAL

## 2025-03-11 DIAGNOSIS — M43.16 SPONDYLOLISTHESIS AT L4-L5 LEVEL: ICD-10-CM

## 2025-03-11 DIAGNOSIS — G89.29 CHRONIC RIGHT HIP PAIN: ICD-10-CM

## 2025-03-11 DIAGNOSIS — M25.551 CHRONIC RIGHT HIP PAIN: ICD-10-CM

## 2025-03-11 PROCEDURE — 97110 THERAPEUTIC EXERCISES: CPT | Mod: GP

## 2025-03-11 PROCEDURE — 97140 MANUAL THERAPY 1/> REGIONS: CPT | Mod: GP

## 2025-03-11 NOTE — PROGRESS NOTES
"Physical Therapy    Physical Therapy Treatment    Patient Name: Joselin Dunn  MRN: [id number]  Today's Date: 3/11/2025  Time Calculation  Start Time: 1515  Stop Time: 1600  Time Calculation (min): 45 min        Insurance:  Visit: #2  Authorized: 20  Certification: : 2/12/2025 - 2/12/2026   Payor: [employer] [facility] / Plan: [employer] [facility] / Product Type: *No Product type* /       Subjective:  Patient reports to physical therapy with improvements in low back symptoms since beginning physical therapy. She notes that she continues to have lingering symptoms of low back discomfort early in the morning and late in the evenings. Her compliance to current HEP has been non existent, reporting that she has not performed it once since beginning initial therapy session.         Current pain: 0/10      Objective:  TTP = (R) glute med, TFL        Treatment:  Manual Therapy = 1 unit  Pin and stretch to right gluteus medius and TFL   Prone pin and stretch to glute med with passive hip IR/ER   Prone pin and stretch to glute med with passive hip flexor stretch = 3x30\" holds   Sidelying pin to TFL with passive hip flexion/extension = 15x     Therapeutic Exercise = 2 units  Seated forward reaching with hold = 1x5x5\" hold  Standing hamstring stretch = 2x30\" holds   Standing march with Opp. Arm overhead reach = 5x20\" holds each side   Lateral band walking = 3x30\" (blue band)         HEP: #2 (Access Code = [email or web address])   - Seated Lumbar Flexion Stretch  - 1 x daily - 7 x weekly - 1 sets - 10 reps - 5 seconds hold  - Standing Hamstring Stretch with Step  - 1 x daily - 7 x weekly - 2 sets - 30 seconds hold  - Standing Marching  - 1 x daily - 7 x weekly - 2 sets - 5 reps - 20 seconds hold  - Side Stepping with Resistance at Ankles  - 1 x daily - 7 x weekly - 3 sets - 30 seconds hold        Diagnosis:  1. Spondylolisthesis at L4-L5 level    2. Chronic right hip pain          Assessment:   Pt demonstrates tolerance to " interventions in session, initially a review of previously provided HEP due to patient's lack of compliance to perform since initial evaluation. Frequent cueing for set up and performance required throughout for each intervention, patient becomes independent by end of session performing without need for cueing. Patient provided updated HEP including the performance of hamstring stretch and modification to standing marches (see above). Patient will continue to benefit from physical therapy services in order to return to prior level of function without compensatory motions during active movements.          Plan:  Reinforce compliance to current HEP   Address proximal hip musculature weakness and sensitivity with palpation   Assess  Joint mobility of lumbar spine   Pelvic position in supine  SIJ involvement       Isra Travis, PT

## 2025-03-12 ENCOUNTER — CLINICAL SUPPORT (OUTPATIENT)
Dept: AUDIOLOGY | Facility: CLINIC | Age: 60
End: 2025-03-12
Payer: COMMERCIAL

## 2025-03-12 ENCOUNTER — APPOINTMENT (OUTPATIENT)
Dept: OTOLARYNGOLOGY | Facility: CLINIC | Age: 60
End: 2025-03-12
Payer: COMMERCIAL

## 2025-03-12 DIAGNOSIS — H93.13 TINNITUS OF BOTH EARS: ICD-10-CM

## 2025-03-12 DIAGNOSIS — H90.3 BILATERAL SENSORINEURAL HEARING LOSS: Primary | ICD-10-CM

## 2025-03-12 DIAGNOSIS — H61.21 IMPACTED CERUMEN OF RIGHT EAR: ICD-10-CM

## 2025-03-12 DIAGNOSIS — E11.9 TYPE 2 DIABETES MELLITUS WITHOUT COMPLICATION, UNSPECIFIED WHETHER LONG TERM INSULIN USE (MULTI): ICD-10-CM

## 2025-03-12 DIAGNOSIS — E11.21 TYPE 2 DIABETES MELLITUS WITH DIABETIC NEPHROPATHY, WITHOUT LONG-TERM CURRENT USE OF INSULIN: ICD-10-CM

## 2025-03-12 PROCEDURE — 92567 TYMPANOMETRY: CPT | Performed by: AUDIOLOGIST

## 2025-03-12 PROCEDURE — 4010F ACE/ARB THERAPY RXD/TAKEN: CPT | Performed by: OTOLARYNGOLOGY

## 2025-03-12 PROCEDURE — 69210 REMOVE IMPACTED EAR WAX UNI: CPT | Performed by: OTOLARYNGOLOGY

## 2025-03-12 PROCEDURE — 3048F LDL-C <100 MG/DL: CPT | Performed by: OTOLARYNGOLOGY

## 2025-03-12 PROCEDURE — 99213 OFFICE O/P EST LOW 20 MIN: CPT | Performed by: OTOLARYNGOLOGY

## 2025-03-12 PROCEDURE — 3044F HG A1C LEVEL LT 7.0%: CPT | Performed by: OTOLARYNGOLOGY

## 2025-03-12 PROCEDURE — 92553 AUDIOMETRY AIR & BONE: CPT | Performed by: AUDIOLOGIST

## 2025-03-12 RX ORDER — LANCETS 28 GAUGE
1 EACH MISCELLANEOUS 2 TIMES DAILY
COMMUNITY

## 2025-03-12 RX ORDER — LANCETS 28 GAUGE
1 EACH MISCELLANEOUS 2 TIMES DAILY
Qty: 200 EACH | Refills: 3 | Status: CANCELLED | OUTPATIENT
Start: 2025-03-12

## 2025-03-12 NOTE — PROGRESS NOTES
Ms. Dunn, age 59, was seen today for a repeat hearing evaluation during her ENT follow up with Dr. Cameron.  She has a known mild to moderate hearing loss bilaterally arriving today reporting worsening tinnitus since her last visit bilaterally.    Results:  Otoscopy revealed clear ear canals and tympanic membranes were visualized bilaterally.  Tympanometry revealed normal, Type A tympanograms, indicating normal ear canal volume, peak pressure and compliance bilaterally.   Audiometric thresholds revealed a slight sloping to moderate sensorineural hearing loss bilaterally.  Hearing levels have remained stable as compared to her last visit December 2024.    Recommendations:  Follow-up with PCP, Dr. Hernandez, as medically directed.  Follow-up with ENT, Dr. Cameron, as medically directed.  Consideration for binaural amplification.  Retest hearing as directed in conjunction with otologic care or annually to monitor.

## 2025-03-12 NOTE — PROGRESS NOTES
"Subjective   Patient ID: Joselin Dunn is a 59 y.o. female who presents for Tinnitus.    HPI  Patient returns, being seen for tinnitus. Tinnitus worsening since last visit, described as \"buzzing\". Denies decrease in hearing, otalgia, otorrhea, dizziness, vertigo. All remaining head neck inquiry otherwise negative.     Review of Systems   Constitutional: Negative.    HENT: Negative.     Respiratory: Negative.     Cardiovascular: Negative.    Neurological: Negative.      Physical Exam  General appearance: No acute distress. Normal facies. Symmetric facial movement. No gross lesions of the face are noted.  Ears:  The external ear structures appear normal. The right ear canal is impacted with cerumen, which was removed today. The left ear canal is patent and the tympanic membranes are intact without evidence of air-fluid levels, retraction, or congenital defects.    Nose:  Anterior rhinoscopy notes essentially a midline nasal septum. Examination is noted for normal healthy mucosal membranes without any evidence of lesions, polyps, or exudate.   Throat/Oral mucosa:  The tongue is normally mobile. There are no lesions on the gingiva, buccal, or oral mucosa. There are no oral cavity masses.  Neck:  The neck is negative for mass lymphadenopathy. The trachea and parotid are clear. The thyroid bed is grossly unremarkable. The salivary gland structures are grossly unremarkable.    Audiogram:  Completed today shows slight sloping to moderate sensorineural hearing loss bilaterally, stable compared to most recent Audiogram in December 2024.     Procedure:  Right ear with impacted cerumen, removed under otomicroscopic examination using suction, curette, and forceps. Patient tolerated procedure without difficulty. Following removal, TM is intact with no sign of infection, effusion, retraction, or perforation.     Assessment/Plan   Bilateral sensorineural hearing loss, stable on today's Audiogram compared to most recent. Recommend " follow-up in 1yr, sooner if needed.   Impacted cerumen right ear. Patient presents for evaluation of cerumen impaction. The ear was cleaned using otomicroscope and instrumentation.  Patient tolerated the procedure well. All questions were answered to patient's satisfaction. Patient to follow-up as needed.

## 2025-03-14 RX ORDER — CALCIUM CITRATE/VITAMIN D3 200MG-6.25
1 TABLET ORAL 2 TIMES DAILY
Qty: 100 STRIP | Refills: 3 | Status: SHIPPED | OUTPATIENT
Start: 2025-03-14

## 2025-03-15 LAB
CYTOLOGY CMNT CVX/VAG CYTO-IMP: NORMAL
HPV HR 12 DNA GENITAL QL NAA+PROBE: NEGATIVE
HPV HR GENOTYPES PNL CVX NAA+PROBE: NEGATIVE
HPV16 DNA SPEC QL NAA+PROBE: NEGATIVE
HPV18 DNA SPEC QL NAA+PROBE: NEGATIVE
LAB AP HPV GENOTYPE QUESTION: YES
LAB AP HPV HR: NORMAL
LAB AP PREVIOUS ABNORMAL HISTORY: NORMAL
LAB AP TREATMENT HISTORY: NORMAL
LABORATORY COMMENT REPORT: NORMAL
PATH REPORT.TOTAL CANCER: NORMAL

## 2025-03-18 ENCOUNTER — APPOINTMENT (OUTPATIENT)
Dept: PHYSICAL THERAPY | Facility: CLINIC | Age: 60
End: 2025-03-18
Payer: COMMERCIAL

## 2025-03-19 DIAGNOSIS — E11.21 TYPE 2 DIABETES MELLITUS WITH DIABETIC NEPHROPATHY, WITHOUT LONG-TERM CURRENT USE OF INSULIN: ICD-10-CM

## 2025-03-19 RX ORDER — LANCETS 28 GAUGE
1 EACH MISCELLANEOUS 2 TIMES DAILY
Qty: 200 EACH | Refills: 3 | Status: SHIPPED | OUTPATIENT
Start: 2025-03-19

## 2025-03-25 ENCOUNTER — TREATMENT (OUTPATIENT)
Dept: PHYSICAL THERAPY | Facility: CLINIC | Age: 60
End: 2025-03-25
Payer: COMMERCIAL

## 2025-03-25 DIAGNOSIS — G89.29 CHRONIC RIGHT HIP PAIN: ICD-10-CM

## 2025-03-25 DIAGNOSIS — M43.16 SPONDYLOLISTHESIS AT L4-L5 LEVEL: ICD-10-CM

## 2025-03-25 DIAGNOSIS — M25.551 CHRONIC RIGHT HIP PAIN: ICD-10-CM

## 2025-03-25 PROCEDURE — 97110 THERAPEUTIC EXERCISES: CPT | Mod: GP

## 2025-03-25 NOTE — PROGRESS NOTES
"Physical Therapy    Physical Therapy Treatment    Patient Name: Joselin Dunn  MRN: [id number]  Today's Date: 3/26/2025  Time Calculation  Start Time: 1520  Stop Time: 1600  Time Calculation (min): 40 min        Insurance:  Visit: #3  Authorized: 20  Certification: : 2/12/2025 - 2/12/2026   Payor: [employer] [facility] / Plan: [employer] [facility] / Product Type: *No Product type* /       Subjective:  Patient reports to physical therapy with overall improvements low back pain since beginning physical therapy. She notes that she experiences pain \"...sometimes but not as often as it was\".         Current pain: 0/10      Objective:  Inability to perform full abdominal crunch with hands behind head or hands across the chest       Treatment:  Therapeutic Exercise = 3 units  Nu-step = 5 minutes ; level 4 (seat 10, arms 10)  Review of current HEP   Standing hamstring stretch = 2x30\" holds   Lumbar forward flexion stretch in sitting = 1x10x5\" holds   Added   Knee to chest with YTB = 2x5x5\" holds   Abdominal crunch in supine   Patient education   Usage of HEP application on phone to complete HEP         HEP: #2 (Access Code = [email or web address])   - Standing Hamstring Stretch with Step  - 1 x daily - 7 x weekly - 2 sets - 30 seconds hold  - Supine Knee to Chest with Leg Straight  - 1 x daily - 7 x weekly - 1 sets - 5 reps - 5 seconds hold  - Ab Prep  - 1 x daily - 7 x weekly - 2 sets - 10 reps  - Seated Lumbar Flexion Stretch  - 1 x daily - 7 x weekly - 1 sets - 10 reps - 5 seconds hold        Diagnosis:  1. Spondylolisthesis at L4-L5 level    2. Chronic right hip pain          Assessment:   Pt demonstrates tolerance to interventions in session, initially a review of previously provided HEP due to patient's lack of compliance to perform since initial evaluation. Frequent cueing for set up and performance required throughout for each intervention, patient becomes independent by end of session performing without need for " cueing. Patient provided updated HEP focusing lumbar flexion-biased movements. Patient will continue to benefit from physical therapy services in order to return to prior level of function without compensatory motions during active movements.          Plan:  Reinforce compliance to current HEP   Address proximal hip musculature weakness and sensitivity with palpation   Assess  Joint mobility of lumbar spine   Pelvic position in supine  SIJ involvement       Isra Travis, PT

## 2025-04-01 ENCOUNTER — APPOINTMENT (OUTPATIENT)
Dept: PHYSICAL THERAPY | Facility: CLINIC | Age: 60
End: 2025-04-01
Payer: COMMERCIAL

## 2025-04-03 ENCOUNTER — APPOINTMENT (OUTPATIENT)
Dept: CARDIOLOGY | Facility: CLINIC | Age: 60
End: 2025-04-03
Payer: COMMERCIAL

## 2025-04-08 ENCOUNTER — TREATMENT (OUTPATIENT)
Dept: PHYSICAL THERAPY | Facility: CLINIC | Age: 60
End: 2025-04-08
Payer: COMMERCIAL

## 2025-04-08 DIAGNOSIS — M43.16 SPONDYLOLISTHESIS AT L4-L5 LEVEL: ICD-10-CM

## 2025-04-08 DIAGNOSIS — G89.29 CHRONIC RIGHT HIP PAIN: ICD-10-CM

## 2025-04-08 DIAGNOSIS — M25.551 CHRONIC RIGHT HIP PAIN: ICD-10-CM

## 2025-04-08 PROCEDURE — 97110 THERAPEUTIC EXERCISES: CPT | Mod: GP

## 2025-04-08 NOTE — PROGRESS NOTES
"Physical Therapy    Physical Therapy Treatment    Patient Name: Joselin Dunn  MRN: [id number]  Today's Date: 4/8/2025  Time Calculation  Start Time: 1528  Stop Time: 1608  Time Calculation (min): 40 min        Insurance:  Visit: #4  Authorized: 20  Certification: : 2/12/2025 - 2/12/2026   Payor: [employer] [facility] [facility] / Plan: [employer] [facility] / Product Type: *No Product type* /       Subjective:  Patient reports to physical therapy with near elimination of previous right sided low back and hip pain when compared to the initial evaluation. Over the last week the pain has been \"non-existent\", but there is still some movements that recreate symptoms which include rolling over in bed or twisting when reaching for items within the kitchen. However, this pain does not linger beyond the initial \"twinge\" of discomfort.          Current pain: 0/10      Objective:  Supine knee to chest AROM = 90 degrees of hip flexion bilat without reproduction of symptoms         Treatment:  Therapeutic Exercise = 3 units  Nu-step =   5 minutes ; level 2  5 minutes ; level 4  Review of current HEP   Standing hamstring stretch = 2x30\" holds   Abdominal crunch in supine =   2x15 with swiss ball    Knee to chest with GTB = 2x12 each side   Seated forward trunk flexion on swiss ball = 15x   Seated forward trunk flexion on swiss ball with prolonged holds = 5x  Patient education   Performance of HEP despite the reduction of symptoms          HEP: #2 (Access Code = [email or web address])   - Standing Hamstring Stretch with Step  - 1 x daily - 7 x weekly - 2 sets - 30 seconds hold  - Supine Knee to Chest with Leg Straight  - 1 x daily - 7 x weekly - 1 sets - 5 reps - 5 seconds hold  - Ab Prep  - 1 x daily - 7 x weekly - 2 sets - 15 reps  - Seated Lumbar Flexion Stretch  - 1 x daily - 7 x weekly - 1 sets - 10 reps - 5 seconds hold        Diagnosis:  1. Spondylolisthesis at L4-L5 level    2. Chronic right hip pain          Assessment:   Patient " continues to respond well to interventions in physical therapy, noting near elimination of all lower back and hip related symptoms that she was previously experiencing. As a result of this improvement, discussed with patient was the consideration for discharge following the next scheduled session. Patient is agreeable with this consideration with the understanding that if her symptoms return or worsen in the upcoming week/s or month/s that she should return to physical therapy to address. A large emphasis during patient education was placed on performance of current HEP in order to reduce further risk of injury and limitations in functional capacity to dynamic movements.         Plan:  Reinforce compliance to current HEP   Address proximal hip musculature weakness and sensitivity with palpation       Isra Travis, PT

## 2025-04-15 ENCOUNTER — TREATMENT (OUTPATIENT)
Dept: PHYSICAL THERAPY | Facility: CLINIC | Age: 60
End: 2025-04-15
Payer: COMMERCIAL

## 2025-04-15 DIAGNOSIS — M43.16 SPONDYLOLISTHESIS AT L4-L5 LEVEL: ICD-10-CM

## 2025-04-15 DIAGNOSIS — M25.551 CHRONIC RIGHT HIP PAIN: ICD-10-CM

## 2025-04-15 DIAGNOSIS — G89.29 CHRONIC RIGHT HIP PAIN: ICD-10-CM

## 2025-04-15 PROCEDURE — 97110 THERAPEUTIC EXERCISES: CPT | Mod: GP

## 2025-04-15 PROCEDURE — 97164 PT RE-EVAL EST PLAN CARE: CPT | Mod: GP

## 2025-04-15 NOTE — PROGRESS NOTES
Physical Therapy    Physical Therapy Treatment    Patient Name: Joselin Dunn  MRN: [id number]  Today's Date: 4/15/2025  Time Calculation  Start Time: 1522  Stop Time: 1600  Time Calculation (min): 38 min        Insurance:  Visit: #5  Authorized: 20  Certification: : 2/12/2025 - 2/12/2026   Payor: [employer] / Plan: [employer] [facility] / Product Type: *No Product type* /       Subjective:  Patient reports to physical therapy with overall improvements in left sided low back and hip related pain since beginning physical therapy. Denies any limitation in functional movements at home as a result of previous right sided low back and hip pain. Over time she has not experienced any of the same pain that she used to, agreeing to discharge in session today.         Current pain: 0/10  Worst pain = 1-2/10       Objective:  Outcome measure  JASMIN = 6% disability     Hip ROM  IR (sitting)  (R) =  30 A   ER (sitting)  (R) =  30 A,  P with muscular end-feel  Hip MMT  Flexion = 4/5 bilat  IR = (R) 4-/5   ER = (R) 4/5     Lumbar AROM  Flexion = fingers to toes  Extension = 10% with increased tightness during       Treatment:  Therapeutic Exercise = 1 units  Seated forward trunk flexion on swiss ball = 2x10  Nu-step =   3 minutes ; level 2  5 minutes ; level 4        HEP: #2 (Access Code = [email or web address])   - Standing Hamstring Stretch with Step  - 1 x daily - 7 x weekly - 2 sets - 30 seconds hold  - Supine Knee to Chest with Leg Straight  - 1 x daily - 7 x weekly - 1 sets - 5 reps - 5 seconds hold  - Ab Prep  - 1 x daily - 7 x weekly - 2 sets - 15 reps  - Seated Lumbar Flexion Stretch  - 1 x daily - 7 x weekly - 1 sets - 10 reps - 5 seconds hold        Diagnosis:  1. Spondylolisthesis at L4-L5 level    2. Chronic right hip pain          Assessment:   Patient continues to note no reproduction of previously experienced right sided low back and hip pain that she experienced with twisting and bending motions felt while  performing ADLs and self-care activities. Reassessment continues to show limitations in hip and lumbar mobility and proximal hip strength, but does not reproduce similar symptoms.         Plan:  Reinforce compliance to current HEP   Address proximal hip musculature weakness and sensitivity with palpation       Isra Travis, PT

## 2025-04-25 DIAGNOSIS — I10 PRIMARY HYPERTENSION: ICD-10-CM

## 2025-04-25 RX ORDER — POTASSIUM CHLORIDE 20 MEQ/1
TABLET, EXTENDED RELEASE ORAL
Qty: 90 TABLET | Refills: 0 | Status: SHIPPED | OUTPATIENT
Start: 2025-04-25

## 2025-04-25 NOTE — TELEPHONE ENCOUNTER
Pharmacy requesting refills   5/29/2024  Future Appointments   Date Time Provider Department Center   5/13/2025  2:15 PM Jomar Motta DO [id number] [place]   5/14/2025  2:45 PM Jacob Camp MD [id number] [place]   6/5/2025  1:00 PM Brain Van DO [id number] [place]   6/17/2025  2:00 PM Lisa Hernandez MD [id number] [place]   9/19/2025  2:00 PM MD MARIAN De La [id number] [place]   12/10/2025  2:00 PM MD MARIAN De La [id number] [place]

## 2025-05-09 PROBLEM — I47.20 PAROXYSMAL VENTRICULAR TACHYCARDIA: Status: RESOLVED | Noted: 2022-02-02 | Resolved: 2025-05-09

## 2025-05-13 ENCOUNTER — APPOINTMENT (OUTPATIENT)
Dept: OBSTETRICS AND GYNECOLOGY | Facility: CLINIC | Age: 60
End: 2025-05-13
Payer: COMMERCIAL

## 2025-05-14 ENCOUNTER — APPOINTMENT (OUTPATIENT)
Dept: CARDIOLOGY | Facility: CLINIC | Age: 60
End: 2025-05-14
Payer: COMMERCIAL

## 2025-05-14 VITALS
DIASTOLIC BLOOD PRESSURE: 70 MMHG | WEIGHT: 242 LBS | HEIGHT: 62 IN | BODY MASS INDEX: 44.53 KG/M2 | HEART RATE: 72 BPM | SYSTOLIC BLOOD PRESSURE: 122 MMHG

## 2025-05-14 DIAGNOSIS — I47.20 PAROXYSMAL VENTRICULAR TACHYCARDIA: Primary | ICD-10-CM

## 2025-05-14 DIAGNOSIS — I10 PRIMARY HYPERTENSION: ICD-10-CM

## 2025-05-14 DIAGNOSIS — J44.9 CHRONIC OBSTRUCTIVE PULMONARY DISEASE, UNSPECIFIED COPD TYPE (MULTI): ICD-10-CM

## 2025-05-14 DIAGNOSIS — Z87.891 FORMER SMOKER: ICD-10-CM

## 2025-05-14 PROCEDURE — 3074F SYST BP LT 130 MM HG: CPT | Performed by: INTERNAL MEDICINE

## 2025-05-14 PROCEDURE — 4010F ACE/ARB THERAPY RXD/TAKEN: CPT | Performed by: INTERNAL MEDICINE

## 2025-05-14 PROCEDURE — 3078F DIAST BP <80 MM HG: CPT | Performed by: INTERNAL MEDICINE

## 2025-05-14 PROCEDURE — 3048F LDL-C <100 MG/DL: CPT | Performed by: INTERNAL MEDICINE

## 2025-05-14 PROCEDURE — 99214 OFFICE O/P EST MOD 30 MIN: CPT | Performed by: INTERNAL MEDICINE

## 2025-05-14 PROCEDURE — 1036F TOBACCO NON-USER: CPT | Performed by: INTERNAL MEDICINE

## 2025-05-14 PROCEDURE — 3044F HG A1C LEVEL LT 7.0%: CPT | Performed by: INTERNAL MEDICINE

## 2025-05-14 PROCEDURE — 93000 ELECTROCARDIOGRAM COMPLETE: CPT | Performed by: INTERNAL MEDICINE

## 2025-05-14 PROCEDURE — 3008F BODY MASS INDEX DOCD: CPT | Performed by: INTERNAL MEDICINE

## 2025-05-14 RX ORDER — CICLOPIROX 80 MG/ML
SOLUTION TOPICAL
COMMUNITY
Start: 2025-05-02

## 2025-05-14 ASSESSMENT — ENCOUNTER SYMPTOMS
PALPITATIONS: 0
DYSPNEA ON EXERTION: 0

## 2025-05-14 NOTE — PROGRESS NOTES
CARDIOLOGY OFFICE VISIT      CHIEF COMPLAINT  Chief Complaint   Patient presents with    Follow-up     Pt. Present today for 6 month follow up Primary hypertension       HISTORY OF PRESENT ILLNESS  HPI  60-year-old -American female who is followed for ventricular tachycardia controlled with carvedilol and no documented clinical recurrence, hypertension, dyslipidemia on statin, and normal coronaries per left heart catheterization dated November 22, 2010 and Lexiscan stress test dated February 6, 2019 revealing heavy old genus reversibility/defects in the mid anterior wall to the apex rule out breast attenuation and mildly depressed left ventricular function per left heart catheterization and 2D echocardiogram dated February 3, 2014 revealing normal left ventricular function.    Since the last week's visit she has been doing well.  Denies any chest pain or shortness of breath or palpitations.  She is recently dealing with diabetes mellitus treatment.    EKG performed today shows sinus rhythm at rate of 72 bpm QRS 94 ms QT corrected 455 ms.  Rhythm strip shows the same pattern.      Past Medical History  Medical History[1]    Social History  Social History[2]    Family History   Family History[3]     Allergies:  RX Allergies[4]     Outpatient Medications:  Current Outpatient Medications   Medication Instructions    blood sugar diagnostic (True Metrix Glucose Test Strip) strip 1 strip, subdermal, 2 times daily    buPROPion XL (WELLBUTRIN XL) 300 mg, Daily before breakfast    busPIRone (BUSPAR) 10 mg, 3 times daily    carvedilol (COREG) 37.5 mg, oral, 2 times daily    cholecalciferol (VITAMIN D3) 50 mcg, oral, Daily    ciclopirox (Penlac) 8 % solution     dilTIAZem ER (TIAZAC) 120 mg, Daily    FreeStyle lancets 28 gauge 1 each, miscellaneous, 2 times daily, Use as instructed    levothyroxine (SYNTHROID, LEVOXYL) 112 mcg, oral, Daily, Take on an empty stomach at the same time each day, either 30 to 60 minutes prior  to breakfast    lisinopril 20 mg tablet Take 1 tablet by mouth daily at the same time.    metFORMIN (GLUCOPHAGE) 500 mg, oral, 2 times daily    omeprazole (PRILOSEC) 40 mg, oral, Daily before breakfast    oxyBUTYnin (DITROPAN) 5 mg, oral, 2 times daily    potassium chloride CR 20 mEq ER tablet Take 1 tablet by mouth daily at the same time.    rosuvastatin (Crestor) 20 mg tablet Take 1 tablet by mouth daily at the same time.    Trintellix 10 mg, Daily    Trulicity 0.75 mg, subcutaneous, Once Weekly          REVIEW OF SYSTEMS  Review of Systems   Cardiovascular:  Negative for chest pain, dyspnea on exertion and palpitations.   All other systems reviewed and are negative.        VITALS  Vitals:    05/14/25 1512   BP: 122/70   Pulse: 72       PHYSICAL EXAM  Constitutional:       General: Awake.      Appearance: Normal and healthy appearance. Well-developed and not in distress. Morbidly obese.   Neck:      Vascular: No JVR. JVD normal.   Pulmonary:      Effort: Pulmonary effort is normal.      Breath sounds: Normal breath sounds. No wheezing. No rhonchi. No rales.   Chest:      Chest wall: Not tender to palpatation.   Cardiovascular:      PMI at left midclavicular line. Normal rate. Regular rhythm. Normal S1. Normal S2.       Murmurs: There is no murmur.      No gallop.  No click. No rub.   Pulses:     Intact distal pulses.   Edema:     Peripheral edema absent.   Abdominal:      Tenderness: There is no abdominal tenderness.   Musculoskeletal: Normal range of motion.         General: No tenderness. Skin:     General: Skin is warm and dry.   Neurological:      General: No focal deficit present.      Mental Status: Alert and oriented to person, place and time.           ASSESSMENT AND PLAN      Clinical impressions:  1. Ventricular tachycardia controlled on beta-blockade.  2. Normal left ventricular function per 2D echocardiogram dated October 1, 2020 with structurally normal heart.  3. Left heart catheterization dated  February 27, 2019 revealing an ejection fraction of 60% with normal coronaries.  4. Dyslipidemia on statin.  5. Hypertension, controlled .  6. Hypothyroidism on replacement therapy.  7. Diabetes mellitus.  8. Morbid obesity with BMI 43.62.    Plan recommendation    From the electrophysiologist on point she is doing well we will continue with follow-up in the office every 6 months or sooner if needed.    Continue beta-blocker therapy.    Continue with calcium blocker therapy.    Will offer her cardiac rehabilitation.    Holter monitor 24 hours prior to next office visit.    Risk factor modification and lifestyle modification discussed with patient. Diet , exercise and hydration discussed with patient.    I have personally review with patient during this office visit, laboratory data, echocardiogram results, stress test results, Holter-event monitor results prior and after the last electrophysiology visit. All questions has been answered.    Please excuse any errors in grammar or translation related to this dictation.  Voice recognition software was utilized to prepare this document.      I, Dr. Camp, personally performed the services described in the documentation as scribed by the nurse in my presence, and confirm it is both accurate and complete.            [1]   Past Medical History:  Diagnosis Date    Angina pectoris     Anxiety     I don't know    Arthritis     Bursitis of hip     CPAP (continuous positive airway pressure) dependence     Depression     Diabetes mellitus (Multi)     Disease of thyroid gland     Fever 09/11/2024    GERD (gastroesophageal reflux disease)     HPV (human papilloma virus) infection     Hyperlipidemia, unspecified     Dyslipidemia    Hypertension     Hypothyroidism     Mechanical loosening of other internal prosthetic joint, initial encounter 02/19/2020    Aseptic loosening of prosthetic knee    Nasal congestion 09/11/2024    Obesity     Personal history of colonic polyps 08/02/2017     "History of colon polyps    PTSD (post-traumatic stress disorder) 2014    Radiculopathy, cervical region 2020    Cervical radiculopathy, acute    Sleep apnea     Stress incontinence     Type 2 diabetes mellitus    [2]   Social History  Tobacco Use    Smoking status: Former     Current packs/day: 0.00     Types: Cigarettes     Quit date: 2020     Years since quittin.3    Smokeless tobacco: Former    Tobacco comments:     Very bad habit   Vaping Use    Vaping status: Never Used   Substance Use Topics    Alcohol use: Not Currently     Comment: Rarely drink alcohol    Drug use: Not Currently     Types: Marijuana     Comment: Stopped at 21   [3]   Family History  Problem Relation Name Age of Onset    Diabetes Mother Michelle Estes     Diabetes type II Mother Michelle Estes     Hypertension Mother Michelle Estes     Arthritis Mother Michelle Estes     Diabetes Maternal Grandmother Paul Estes    [4]   Allergies  Allergen Reactions    Sulfa (Sulfonamide Antibiotics) Other     \"Get a fever and skin becomes painful\"    Penicillins Itching and Rash     "

## 2025-05-14 NOTE — PATIENT INSTRUCTIONS
Continue same medications/treatment.  Patient educated on proper medication use.  Patient educated on risk factor modification.  Please bring any lab results from other providers/physicians to your next appointment.    Please bring all medicines, vitamins, and herbal supplements with you when you come to the office.    Prescriptions will not be filled unless you are compliant with your follow up appointments or have a follow up appointment scheduled as per instruction of your physician. Refills should be requested at the time of your visit.    REFER to cardiac rehab  SCHEDULE 24 hour holter monitor before next appointment  Follow up with our physician assistant, Eloise, in 6 months    Sue GARCÍA RN, AM SCRIBING FOR, AND IN THE PRESENCE OF DR. LIZZETTE VILLAFUERTE MD

## 2025-05-20 DIAGNOSIS — Z87.891 FORMER SMOKER: Primary | ICD-10-CM

## 2025-05-22 ENCOUNTER — TELEPHONE (OUTPATIENT)
Dept: RADIOLOGY | Facility: HOSPITAL | Age: 60
End: 2025-05-22
Payer: COMMERCIAL

## 2025-05-22 NOTE — TELEPHONE ENCOUNTER
VM message left requesting patient to call and schedule the follow up Lung Cancer Screening exam. Requested they schedule with radiology @ [phone] .Encouraged  to return my call @  [phone] for any additional assistance.

## 2025-05-25 DIAGNOSIS — E11.9 TYPE 2 DIABETES MELLITUS WITHOUT COMPLICATION, UNSPECIFIED WHETHER LONG TERM INSULIN USE: ICD-10-CM

## 2025-05-31 RX ORDER — METFORMIN HYDROCHLORIDE 500 MG/1
500 TABLET ORAL 2 TIMES DAILY
Qty: 90 TABLET | Refills: 1 | Status: SHIPPED | OUTPATIENT
Start: 2025-05-31

## 2025-06-05 ENCOUNTER — APPOINTMENT (OUTPATIENT)
Dept: ORTHOPEDIC SURGERY | Facility: CLINIC | Age: 60
End: 2025-06-05
Payer: COMMERCIAL

## 2025-06-16 ENCOUNTER — HOSPITAL ENCOUNTER (OUTPATIENT)
Dept: RADIOLOGY | Facility: CLINIC | Age: 60
Discharge: HOME | End: 2025-06-16
Payer: COMMERCIAL

## 2025-06-16 ENCOUNTER — OFFICE VISIT (OUTPATIENT)
Dept: ORTHOPEDIC SURGERY | Facility: CLINIC | Age: 60
End: 2025-06-16
Payer: COMMERCIAL

## 2025-06-16 DIAGNOSIS — M25.561 RIGHT KNEE PAIN, UNSPECIFIED CHRONICITY: ICD-10-CM

## 2025-06-16 DIAGNOSIS — M25.511 RIGHT SHOULDER PAIN, UNSPECIFIED CHRONICITY: ICD-10-CM

## 2025-06-16 DIAGNOSIS — M25.811 SHOULDER IMPINGEMENT, RIGHT: ICD-10-CM

## 2025-06-16 PROCEDURE — 73030 X-RAY EXAM OF SHOULDER: CPT | Mod: RT

## 2025-06-16 PROCEDURE — 3044F HG A1C LEVEL LT 7.0%: CPT | Performed by: ORTHOPAEDIC SURGERY

## 2025-06-16 PROCEDURE — 3048F LDL-C <100 MG/DL: CPT | Performed by: ORTHOPAEDIC SURGERY

## 2025-06-16 PROCEDURE — 99213 OFFICE O/P EST LOW 20 MIN: CPT | Performed by: ORTHOPAEDIC SURGERY

## 2025-06-16 PROCEDURE — 20610 DRAIN/INJ JOINT/BURSA W/O US: CPT | Mod: RT | Performed by: ORTHOPAEDIC SURGERY

## 2025-06-16 PROCEDURE — 2500000004 HC RX 250 GENERAL PHARMACY W/ HCPCS (ALT 636 FOR OP/ED): Performed by: ORTHOPAEDIC SURGERY

## 2025-06-16 PROCEDURE — 73030 X-RAY EXAM OF SHOULDER: CPT | Mod: RIGHT SIDE | Performed by: ORTHOPAEDIC SURGERY

## 2025-06-16 PROCEDURE — 4010F ACE/ARB THERAPY RXD/TAKEN: CPT | Performed by: ORTHOPAEDIC SURGERY

## 2025-06-16 PROCEDURE — 99214 OFFICE O/P EST MOD 30 MIN: CPT | Performed by: ORTHOPAEDIC SURGERY

## 2025-06-16 RX ORDER — LIDOCAINE HYDROCHLORIDE 10 MG/ML
5 INJECTION, SOLUTION INFILTRATION; PERINEURAL
Status: COMPLETED | OUTPATIENT
Start: 2025-06-16 | End: 2025-06-16

## 2025-06-16 RX ORDER — BETAMETHASONE SODIUM PHOSPHATE AND BETAMETHASONE ACETATE 3; 3 MG/ML; MG/ML
12 INJECTION, SUSPENSION INTRA-ARTICULAR; INTRALESIONAL; INTRAMUSCULAR; SOFT TISSUE
Status: COMPLETED | OUTPATIENT
Start: 2025-06-16 | End: 2025-06-16

## 2025-06-16 RX ADMIN — LIDOCAINE HYDROCHLORIDE 5 ML: 10 INJECTION, SOLUTION INFILTRATION; PERINEURAL at 13:48

## 2025-06-16 RX ADMIN — BETAMETHASONE SODIUM PHOSPHATE AND BETAMETHASONE ACETATE 12 MG: 3; 3 INJECTION, SUSPENSION INTRA-ARTICULAR; INTRALESIONAL; INTRAMUSCULAR at 13:48

## 2025-06-16 NOTE — PROGRESS NOTES
History: Joselin is here for her right shoulder.  Her shoulder started bothering her a month and a half ago. She denies any injury or falls. She has not tried anything for it at this time. She denies numbness and tingling down the arm. She has had cortisone injections in the past with good relief.     Past medical history: Multiple  Medications: Multiple  Allergies: No known drug allergies    Please refer to the intake H&P regarding the patient's review of systems, family history and social history as was done today    HEENT: Normal  Lungs: Clear to auscultation  Heart: RRR  Abdomen: Soft, nontender  Skin: clear  Extremity: She can get the arm fully overhead with pain.  She has 5 out of 5 abduction and supraspinatus strength again with pain during stressing.  5 out of 5 rotational strength again with mild pain.  There is pain with impingement maneuvers.  No passive tightness.  No numbness or tingling.  No neck pain.  Contralateral exam is normal for strength, motion, stability and neurovascular assessment.    Radiographs: Right shoulder x-rays today show mild arthritic change in the joint.      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 We gave her a cortisone injection in the right shoulder today. We will get her in to some formal therapy for shoulder impingement protocol. We will see her back in 4-6 weeks to assess progress. We will consider further imaging with a MRI if pain continues.     L Inj/Asp: R subacromial bursa on 6/16/2025 1:48 PM  Indications: pain  Details: 22 G needle, posterior approach  Medications: 5 mL lidocaine 10 mg/mL (1 %); 12 mg betamethasone acet,sod phos 6 mg/mL  Outcome: tolerated well, no immediate complications  Procedure, treatment alternatives, risks and benefits explained, specific risks discussed. Consent was given by the patient. Immediately prior to  procedure a time out was called to verify the correct patient, procedure, equipment, support staff and site/side marked as required. Patient was prepped and draped in the usual sterile fashion.          All questions were answered today with the patient.    Scribe Attestation:  By signing my name below, I, Jameel Acuña attest that this documentation has been prepared under the direction and in the presence of Aron Austin MD.    Provider Attestation - Scribe documentation:  All medical record entries made by Ellen Haynes were at my direction and personally dictated by me, Aron Austin MD. I have reviewed the chart and agree that the record is accurate and I confirmed that it reflects my personal performance of the history, physical exam, discussion, and plan.

## 2025-06-17 ENCOUNTER — APPOINTMENT (OUTPATIENT)
Dept: PRIMARY CARE | Facility: CLINIC | Age: 60
End: 2025-06-17
Payer: COMMERCIAL

## 2025-06-17 VITALS
WEIGHT: 240.8 LBS | HEIGHT: 62 IN | DIASTOLIC BLOOD PRESSURE: 68 MMHG | SYSTOLIC BLOOD PRESSURE: 126 MMHG | HEART RATE: 69 BPM | TEMPERATURE: 97.6 F | OXYGEN SATURATION: 99 % | RESPIRATION RATE: 18 BRPM | BODY MASS INDEX: 44.31 KG/M2

## 2025-06-17 DIAGNOSIS — I42.9 CARDIOMYOPATHY, UNSPECIFIED TYPE (MULTI): ICD-10-CM

## 2025-06-17 DIAGNOSIS — N39.46 MIXED STRESS AND URGE URINARY INCONTINENCE: ICD-10-CM

## 2025-06-17 DIAGNOSIS — K21.9 GASTROESOPHAGEAL REFLUX DISEASE WITHOUT ESOPHAGITIS: ICD-10-CM

## 2025-06-17 DIAGNOSIS — E05.80 IATROGENIC HYPERTHYROIDISM: ICD-10-CM

## 2025-06-17 DIAGNOSIS — E11.9 TYPE 2 DIABETES MELLITUS WITHOUT COMPLICATION, UNSPECIFIED WHETHER LONG TERM INSULIN USE: ICD-10-CM

## 2025-06-17 DIAGNOSIS — E78.2 MIXED HYPERLIPIDEMIA: ICD-10-CM

## 2025-06-17 DIAGNOSIS — Z76.0 ENCOUNTER FOR MEDICATION REFILL: ICD-10-CM

## 2025-06-17 DIAGNOSIS — I10 PRIMARY HYPERTENSION: ICD-10-CM

## 2025-06-17 DIAGNOSIS — Z00.00 ENCOUNTER FOR PREVENTIVE HEALTH EXAMINATION: Primary | ICD-10-CM

## 2025-06-17 RX ORDER — LISINOPRIL 20 MG/1
TABLET ORAL
Qty: 90 TABLET | Refills: 1 | Status: SHIPPED | OUTPATIENT
Start: 2025-06-17

## 2025-06-17 RX ORDER — DULAGLUTIDE 0.75 MG/.5ML
INJECTION, SOLUTION SUBCUTANEOUS
Qty: 6 ML | Refills: 0 | OUTPATIENT
Start: 2025-06-17

## 2025-06-17 RX ORDER — OXYBUTYNIN CHLORIDE 5 MG/1
5 TABLET ORAL 2 TIMES DAILY
Qty: 180 TABLET | Refills: 1 | Status: SHIPPED | OUTPATIENT
Start: 2025-06-17

## 2025-06-17 RX ORDER — KETOCONAZOLE 20 MG/G
CREAM TOPICAL
COMMUNITY
Start: 2025-05-29

## 2025-06-17 RX ORDER — OMEPRAZOLE 40 MG/1
40 CAPSULE, DELAYED RELEASE ORAL
Qty: 90 CAPSULE | Refills: 1 | Status: SHIPPED | OUTPATIENT
Start: 2025-06-17

## 2025-06-17 RX ORDER — CARVEDILOL 25 MG/1
37.5 TABLET ORAL 2 TIMES DAILY
Qty: 270 TABLET | Refills: 1 | Status: SHIPPED | OUTPATIENT
Start: 2025-06-17

## 2025-06-17 RX ORDER — HYDROCORTISONE 25 MG/G
OINTMENT TOPICAL
COMMUNITY
Start: 2025-05-29

## 2025-06-17 RX ORDER — LEVOTHYROXINE SODIUM 112 UG/1
112 TABLET ORAL DAILY
Qty: 90 TABLET | Refills: 1 | Status: SHIPPED | OUTPATIENT
Start: 2025-06-17

## 2025-06-17 RX ORDER — ROSUVASTATIN CALCIUM 20 MG/1
TABLET, COATED ORAL
Qty: 90 TABLET | Refills: 2 | Status: SHIPPED | OUTPATIENT
Start: 2025-06-17

## 2025-06-17 RX ORDER — DULAGLUTIDE 0.75 MG/.5ML
0.75 INJECTION, SOLUTION SUBCUTANEOUS
Qty: 7 ML | Refills: 1 | Status: SHIPPED | OUTPATIENT
Start: 2025-06-17

## 2025-06-17 NOTE — PROGRESS NOTES
"Subjective   Chief complaint: Joselin Dunn is a 60 y.o. female who presents for Follow-up (4 month) and Annual Exam.    HPI:  Here for an annual wellness visit.  Medications:  no concerns  Diet:  discussed healthy guidelines  Exercise:  discussed healthy guidelines, has started You Tube exercise videos  Sleep:  getting better, sometimes 7 hours, sometimes less  Mood:  good, no depression, no suicidal ideation  Ophthalmology:  up to date  Dental:  up to date  Advanced Directives:  no living will nor POA  Falls:  no falls, no balance concerns    REVIEW OF SYSTEMS:  Constitutional:  No fever nor chills.  No significant weight changes.    Eyes: No vision changes.  Sclerae clear.  ENT:  No hearing changes.  No nasal congestion.    Cardiovascular:  No chest pains, palpitations, or dyspnea on exertion.  Respiratory:  No cough or shortness of breath.  No wheezing.   GI:  No bowel habit changes. No nausea or vomiting.    MS:  No muscle or joint pains.  Lymphatics:  No swelling.  CNS:  No weakness.  No numbness nor tingling.  No gait change.  Psychiatric: No depression, no anxiety.  Mood is positive and appropriate. Depression screening completed using the PHQ-2 screening tool.  See rooming flow sheet for documentation.  5 minutes spent.              Objective   /68 (BP Location: Left arm, Patient Position: Sitting, BP Cuff Size: Large adult)   Pulse 69   Temp 36.4 °C (97.6 °F) (Temporal)   Resp 18   Ht 1.575 m (5' 2\")   Wt 109 kg (240 lb 12.8 oz)   LMP  (LMP Unknown)   SpO2 99%   BMI 44.04 kg/m²   Physical Exam  Constitutional:       Appearance: Normal appearance. She is obese.   HENT:      Head: Normocephalic and atraumatic.      Nose: Nose normal. No congestion.   Eyes:      Conjunctiva/sclera: Conjunctivae normal.   Cardiovascular:      Rate and Rhythm: Normal rate and regular rhythm.      Heart sounds: Normal heart sounds.   Pulmonary:      Effort: Pulmonary effort is normal.      Breath sounds: No " wheezing, rhonchi or rales.   Musculoskeletal:      Cervical back: Neck supple.   Skin:     General: Skin is warm and dry.   Neurological:      General: No focal deficit present.      Mental Status: She is alert and oriented to person, place, and time.   Psychiatric:         Mood and Affect: Mood normal.         Behavior: Behavior normal.         Thought Content: Thought content normal.         Judgment: Judgment normal.       Review of Systems   I have reviewed and reconciled the medication list with the patient today. Current Medications[1]     Imaging:  Imaging  No results found.    Cardiology, Vascular, and Other Imaging  XR shoulder right 2+ views  Result Date: 6/16/2025  Interpreted By:  Tammy Austin, STUDY: XR SHOULDER RIGHT 2+ VIEWS; 6/16/2025 1:40 pm   INDICATION: Signs/Symptoms:shoulder pain.   ACCESSION NUMBER(S): [id number]   ORDERING CLINICIAN: TAMMY AUSTIN   FINDINGS: AP lateral oblique views of the left shoulder shows mild degenerative change with slight joint space narrowing. Spurring beneath the AC joint also present. No acute fractures identified.     Signed by: Tammy Austin 6/16/2025 4:50 PM Dictation workstation:   [id number]         Labs reviewed:    Lab Results   Component Value Date    WBC 7.7 01/23/2025    HGB 12.5 01/23/2025    HCT 38.8 01/23/2025     01/23/2025    CHOL 98 01/23/2025    TRIG 151 (H) 01/23/2025    HDL 38.7 01/23/2025    ALT 32 01/23/2025    AST 20 01/23/2025     01/23/2025    K 4.3 01/23/2025     (H) 01/23/2025    CREATININE 1.07 (H) 01/23/2025    BUN 20 01/23/2025    CO2 24 01/23/2025    TSH 0.66 01/23/2025    HGBA1C 5.9 (H) 01/23/2025       Assessment/Plan   Problem List Items Addressed This Visit       Cardiomyopathy (Chronic)    Sees cardiology for follow up.         Relevant Medications    carvedilol (Coreg) 25 mg tablet    Encounter for medication refill    Medication reviewed.  Refills given.         Encounter for preventive health  examination - Primary    Age appropriate preventive measures reviewed and discussed.  Diet and exercise recommendations discussed.             Hyperlipidemia    Relevant Medications    rosuvastatin (Crestor) 20 mg tablet    Primary hypertension    Controlled. Continue present management.         Relevant Medications    carvedilol (Coreg) 25 mg tablet    lisinopril 20 mg tablet    Type 2 diabetes mellitus without complication    Relevant Medications    dulaglutide (Trulicity) 0.75 mg/0.5 mL pen injector     Other Visit Diagnoses         Iatrogenic hyperthyroidism        Relevant Medications    levothyroxine (Synthroid, Levoxyl) 112 mcg tablet      Gastroesophageal reflux disease without esophagitis        Relevant Medications    omeprazole (PriLOSEC) 40 mg DR capsule      Mixed stress and urge urinary incontinence        Relevant Medications    oxyBUTYnin (Ditropan) 5 mg tablet            Continue current medications as listed  Follow up in 3 months.  Will need a diabetes appointment.            [1]   Current Outpatient Medications:     blood sugar diagnostic (True Metrix Glucose Test Strip) strip, 1 strip by subdermal route 2 times a day., Disp: 100 strip, Rfl: 3    buPROPion XL (Wellbutrin XL) 300 mg 24 hr tablet, Take 1 tablet (300 mg) by mouth once daily in the morning. Take before meals., Disp: , Rfl:     busPIRone (Buspar) 10 mg tablet, Take 1 tablet (10 mg) by mouth 3 times a day., Disp: , Rfl:     cholecalciferol (Vitamin D3) 50 mcg (2,000 unit) capsule, Take 1 capsule (50 mcg) by mouth once daily., Disp: 90 capsule, Rfl: 3    ciclopirox (Penlac) 8 % solution, , Disp: , Rfl:     dilTIAZem ER (Tiazac) 120 mg 24 hr capsule, Take 1 capsule (120 mg) by mouth once daily., Disp: , Rfl:     FreeStyle lancets 28 gauge, 1 each 2 times a day. Use as instructed, Disp: 200 each, Rfl: 3    hydrocortisone 2.5 % ointment, , Disp: , Rfl:     ketoconazole (NIZOral) 2 % cream, , Disp: , Rfl:     metFORMIN (Glucophage) 500 mg  tablet, Take 1 tablet by mouth twice daily., Disp: 90 tablet, Rfl: 1    potassium chloride CR 20 mEq ER tablet, Take 1 tablet by mouth daily at the same time., Disp: 90 tablet, Rfl: 0    Trintellix 10 mg tablet tablet, Take 1 tablet (10 mg) by mouth once daily., Disp: , Rfl:     carvedilol (Coreg) 25 mg tablet, Take 1.5 tablets (37.5 mg) by mouth 2 times a day., Disp: 270 tablet, Rfl: 1    dulaglutide (Trulicity) 0.75 mg/0.5 mL pen injector, Inject 0.75 mg under the skin 1 (one) time per week., Disp: 7 mL, Rfl: 1    levothyroxine (Synthroid, Levoxyl) 112 mcg tablet, Take 1 tablet (112 mcg) by mouth early in the morning.. Take on an empty stomach at the same time each day, either 30 to 60 minutes prior to breakfast, Disp: 90 tablet, Rfl: 1    lisinopril 20 mg tablet, Take 1 tablet by mouth daily at the same time., Disp: 90 tablet, Rfl: 1    omeprazole (PriLOSEC) 40 mg DR capsule, Take 1 capsule (40 mg) by mouth once daily in the morning. Take before meals., Disp: 90 capsule, Rfl: 1    oxyBUTYnin (Ditropan) 5 mg tablet, Take 1 tablet (5 mg) by mouth 2 times a day., Disp: 180 tablet, Rfl: 1    rosuvastatin (Crestor) 20 mg tablet, Take 1 tablet by mouth daily at the same time., Disp: 90 tablet, Rfl: 2

## 2025-06-18 ENCOUNTER — TELEPHONE (OUTPATIENT)
Dept: PRIMARY CARE | Facility: CLINIC | Age: 60
End: 2025-06-18
Payer: COMMERCIAL

## 2025-06-18 NOTE — TELEPHONE ENCOUNTER
Received call from Sue from [facility] regarding pt expressing wanting to start outpatient PT strength training for upper and lower extremities. For any questions her number is [phone].

## 2025-06-21 DIAGNOSIS — R53.1 GENERAL WEAKNESS: Primary | ICD-10-CM

## 2025-06-26 ENCOUNTER — APPOINTMENT (OUTPATIENT)
Dept: ORTHOPEDIC SURGERY | Facility: CLINIC | Age: 60
End: 2025-06-26
Payer: COMMERCIAL

## 2025-07-02 ENCOUNTER — HOSPITAL ENCOUNTER (OUTPATIENT)
Dept: RADIOLOGY | Facility: HOSPITAL | Age: 60
Discharge: HOME | End: 2025-07-02
Payer: COMMERCIAL

## 2025-07-02 DIAGNOSIS — Z87.891 FORMER SMOKER: ICD-10-CM

## 2025-07-02 PROCEDURE — 71271 CT THORAX LUNG CANCER SCR C-: CPT

## 2025-07-02 PROCEDURE — 71271 CT THORAX LUNG CANCER SCR C-: CPT | Performed by: RADIOLOGY

## 2025-07-18 ENCOUNTER — APPOINTMENT (OUTPATIENT)
Dept: PHYSICAL THERAPY | Facility: CLINIC | Age: 60
End: 2025-07-18
Payer: COMMERCIAL

## 2025-07-21 ENCOUNTER — APPOINTMENT (OUTPATIENT)
Dept: ORTHOPEDIC SURGERY | Facility: CLINIC | Age: 60
End: 2025-07-21
Payer: COMMERCIAL

## 2025-07-21 NOTE — PROGRESS NOTES
History: Joselin is here for her right shoulder.  We gave her a cortisone injection into the right shoulder on 6/16/25.     Past medical history: Multiple  Medications: Multiple  Allergies: No known drug allergies    Please refer to the intake H&P regarding the patient's review of systems, family history and social history as was done today    HEENT: Normal  Lungs: Clear to auscultation  Heart: RRR  Abdomen: Soft, nontender  Skin: clear  Extremity: []  Contralateral exam is normal for strength, motion, stability and neurovascular assessment.    Radiographs: Right shoulder x-rays today show mild arthritic change in the joint.      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    Scribe Attestation:  By signing my name below, I, Jameel Acuña attest that this documentation has been prepared under the direction and in the presence of Aron Austin MD.    Provider Attestation - Scribe documentation:  All medical record entries made by Ellen Haynes were at my direction and personally dictated by me, Aron Austin MD. I have reviewed the chart and agree that the record is accurate and I confirmed that it reflects my personal performance of the history, physical exam, discussion, and plan.

## 2025-07-23 ENCOUNTER — APPOINTMENT (OUTPATIENT)
Dept: PHYSICAL THERAPY | Facility: CLINIC | Age: 60
End: 2025-07-23
Payer: COMMERCIAL

## 2025-07-24 DIAGNOSIS — I10 PRIMARY HYPERTENSION: ICD-10-CM

## 2025-07-24 DIAGNOSIS — E11.9 TYPE 2 DIABETES MELLITUS WITHOUT COMPLICATION, UNSPECIFIED WHETHER LONG TERM INSULIN USE: ICD-10-CM

## 2025-07-29 DIAGNOSIS — E11.9 TYPE 2 DIABETES MELLITUS WITHOUT COMPLICATION, UNSPECIFIED WHETHER LONG TERM INSULIN USE: ICD-10-CM

## 2025-07-29 DIAGNOSIS — I10 PRIMARY HYPERTENSION: ICD-10-CM

## 2025-07-29 RX ORDER — METFORMIN HYDROCHLORIDE 500 MG/1
500 TABLET ORAL 2 TIMES DAILY
Qty: 90 TABLET | Refills: 0 | OUTPATIENT
Start: 2025-07-29

## 2025-07-29 RX ORDER — POTASSIUM CHLORIDE 20 MEQ/1
TABLET, EXTENDED RELEASE ORAL
Qty: 90 TABLET | Refills: 0 | OUTPATIENT
Start: 2025-07-29

## 2025-08-04 ENCOUNTER — OFFICE VISIT (OUTPATIENT)
Dept: ORTHOPEDIC SURGERY | Facility: CLINIC | Age: 60
End: 2025-08-04
Payer: COMMERCIAL

## 2025-08-04 DIAGNOSIS — M25.811 SHOULDER IMPINGEMENT, RIGHT: Primary | ICD-10-CM

## 2025-08-04 PROCEDURE — 4010F ACE/ARB THERAPY RXD/TAKEN: CPT | Performed by: ORTHOPAEDIC SURGERY

## 2025-08-04 PROCEDURE — 99212 OFFICE O/P EST SF 10 MIN: CPT | Performed by: ORTHOPAEDIC SURGERY

## 2025-08-04 PROCEDURE — 99213 OFFICE O/P EST LOW 20 MIN: CPT | Performed by: ORTHOPAEDIC SURGERY

## 2025-08-04 NOTE — PROGRESS NOTES
History: Joselin is here for her right shoulder. We gave her a cortisone injection into the right shoulder 7 weeks ago, which gave her significant relief. It occasionally continues to bother her. Her right hip is bothering her as well. She has not started therapy for her shoulder yet.     Past medical history: Multiple  Medications: Multiple  Allergies: No known drug allergies    Please refer to the intake H&P regarding the patient's review of systems, family history and social history as was done today    HEENT: Normal  Lungs: Clear to auscultation  Heart: RRR  Abdomen: Soft, nontender  Skin: clear  Extremity: She has full overhead motion of the right shoulder.  She does have some pain with stressing but 5 out of 5 strength in all groups tested.  No passive tightness or stiffness.  No numbness or tingling.  Contralateral exam is normal for strength, motion, stability and neurovascular assessment.    Radiographs: Previous right shoulder x-rays show mild arthritic change in the joint.  Previous right hip x-rays show mild degenerative changes.     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 We will get her started on formal therapy for shoulder impingement protocol. She will see us back about a month after starting therapy to assess progress.  She is requesting to see Dr. Mayen for her right hip. If not improving with therapy for her shoulder we will get a MRI.   All questions were answered today with the patient.    Scribe Attestation:  By signing my name below, IEllen Scribe attest that this documentation has been prepared under the direction and in the presence of Aron Austin MD.    Provider Attestation - Scribe documentation:  All medical record entries made by Ellen Haynes were at my direction and personally dictated by me, Aron Austin MD. I have reviewed the chart and agree that the record is accurate and  I confirmed that it reflects my personal performance of the history, physical exam, discussion, and plan.

## 2025-08-12 DIAGNOSIS — M25.551 RIGHT HIP PAIN: Primary | ICD-10-CM

## 2025-08-13 RX ORDER — POTASSIUM CHLORIDE 20 MEQ/1
TABLET, EXTENDED RELEASE ORAL
Qty: 90 TABLET | Refills: 0 | Status: SHIPPED | OUTPATIENT
Start: 2025-08-13

## 2025-08-13 RX ORDER — METFORMIN HYDROCHLORIDE 500 MG/1
500 TABLET ORAL 2 TIMES DAILY
Qty: 90 TABLET | Refills: 1 | Status: SHIPPED | OUTPATIENT
Start: 2025-08-13

## 2025-08-18 ENCOUNTER — HOSPITAL ENCOUNTER (OUTPATIENT)
Dept: RADIOLOGY | Facility: CLINIC | Age: 60
Discharge: HOME | End: 2025-08-18
Payer: COMMERCIAL

## 2025-08-18 ENCOUNTER — OFFICE VISIT (OUTPATIENT)
Dept: ORTHOPEDIC SURGERY | Facility: CLINIC | Age: 60
End: 2025-08-18
Payer: COMMERCIAL

## 2025-08-18 DIAGNOSIS — M76.891 HIP ABDUCTOR TENDINITIS, RIGHT: ICD-10-CM

## 2025-08-18 DIAGNOSIS — R29.898: ICD-10-CM

## 2025-08-18 DIAGNOSIS — M16.11 PRIMARY OSTEOARTHRITIS OF RIGHT HIP: Primary | ICD-10-CM

## 2025-08-18 DIAGNOSIS — M25.551 RIGHT HIP PAIN: ICD-10-CM

## 2025-08-18 PROCEDURE — 4010F ACE/ARB THERAPY RXD/TAKEN: CPT | Performed by: ORTHOPAEDIC SURGERY

## 2025-08-18 PROCEDURE — 99212 OFFICE O/P EST SF 10 MIN: CPT | Performed by: ORTHOPAEDIC SURGERY

## 2025-08-18 PROCEDURE — 99214 OFFICE O/P EST MOD 30 MIN: CPT | Performed by: ORTHOPAEDIC SURGERY

## 2025-08-18 PROCEDURE — 73502 X-RAY EXAM HIP UNI 2-3 VIEWS: CPT | Mod: RIGHT SIDE | Performed by: ORTHOPAEDIC SURGERY

## 2025-08-18 PROCEDURE — 73502 X-RAY EXAM HIP UNI 2-3 VIEWS: CPT | Mod: RT

## 2025-08-18 RX ORDER — MELOXICAM 15 MG/1
15 TABLET ORAL DAILY
Qty: 30 TABLET | Refills: 0 | Status: SHIPPED | OUTPATIENT
Start: 2025-08-18 | End: 2025-09-17

## 2025-08-18 RX ORDER — METHYLPREDNISOLONE 4 MG/1
TABLET ORAL
Qty: 21 TABLET | Refills: 0 | Status: SHIPPED | OUTPATIENT
Start: 2025-08-18 | End: 2025-08-24

## 2025-08-26 DIAGNOSIS — M16.11 PRIMARY OSTEOARTHRITIS OF RIGHT HIP: ICD-10-CM

## 2025-09-04 ENCOUNTER — EVALUATION (OUTPATIENT)
Dept: PHYSICAL THERAPY | Facility: CLINIC | Age: 60
End: 2025-09-04
Payer: COMMERCIAL

## 2025-09-04 DIAGNOSIS — M25.511 CHRONIC RIGHT SHOULDER PAIN: Primary | ICD-10-CM

## 2025-09-04 DIAGNOSIS — G89.29 CHRONIC RIGHT SHOULDER PAIN: Primary | ICD-10-CM

## 2025-09-04 DIAGNOSIS — M62.81 MUSCLE WEAKNESS (GENERALIZED): ICD-10-CM

## 2025-09-04 PROCEDURE — 97161 PT EVAL LOW COMPLEX 20 MIN: CPT | Mod: GP

## 2025-09-04 PROCEDURE — 97140 MANUAL THERAPY 1/> REGIONS: CPT | Mod: GP

## 2025-09-04 PROCEDURE — 97110 THERAPEUTIC EXERCISES: CPT | Mod: GP

## 2025-09-19 ENCOUNTER — APPOINTMENT (OUTPATIENT)
Dept: OTOLARYNGOLOGY | Facility: CLINIC | Age: 60
End: 2025-09-19
Payer: COMMERCIAL

## 2025-09-22 ENCOUNTER — APPOINTMENT (OUTPATIENT)
Dept: PRIMARY CARE | Facility: CLINIC | Age: 60
End: 2025-09-22
Payer: COMMERCIAL

## 2025-10-13 ENCOUNTER — APPOINTMENT (OUTPATIENT)
Dept: CARDIOLOGY | Facility: CLINIC | Age: 60
End: 2025-10-13
Payer: COMMERCIAL

## 2025-11-18 ENCOUNTER — APPOINTMENT (OUTPATIENT)
Dept: CARDIOLOGY | Facility: CLINIC | Age: 60
End: 2025-11-18
Payer: COMMERCIAL

## 2025-12-10 ENCOUNTER — APPOINTMENT (OUTPATIENT)
Dept: OTOLARYNGOLOGY | Facility: CLINIC | Age: 60
End: 2025-12-10
Payer: COMMERCIAL

## 2026-03-03 ENCOUNTER — APPOINTMENT (OUTPATIENT)
Dept: OBSTETRICS AND GYNECOLOGY | Facility: CLINIC | Age: 61
End: 2026-03-03
Payer: COMMERCIAL

## (undated) DEVICE — SUTURE, VICRYL 0, TAPER POINT, CT-1 VIOLET 27 INCH

## (undated) DEVICE — HANDLE, PH, FOR YANKAUER SUCTION DEVICE

## (undated) DEVICE — REDUCER, TUBING, W/SILICONE TUBE

## (undated) DEVICE — SOLUTION, IRRIGATION, STERILE WATER, 1000 ML, POUR BOTTLE

## (undated) DEVICE — CAUTERY, PENCIL, PUSH BUTTON, SMOKE EVAC, 70MM

## (undated) DEVICE — BOWL, BASIN, 32 OZ, STERILE

## (undated) DEVICE — Device

## (undated) DEVICE — GOWN, SURGICAL, IMPLT, BACK, XLARGE, XLONG, STERILE

## (undated) DEVICE — TUBING, SUCTION, 6MM X 10, CLEAN N-COND

## (undated) DEVICE — MANIFOLD, 4 PORT NEPTUNE STANDARD

## (undated) DEVICE — GOWN, SURGICAL, ROYAL SILK, LG, STERILE

## (undated) DEVICE — GLOVE, POLYISOPRENE, SZ 6.5, PF,LF

## (undated) DEVICE — TUBING, RAPIDVAC, SMOKE EVAC, 7/8 X 10

## (undated) DEVICE — CUP, MEDICINE, GRADUATED, 2 OZ, PLASTIC, DISP, LF

## (undated) DEVICE — SCOPPETTES LG COTTON SWABS

## (undated) DEVICE — SOLUTION, IRRIGATION, USP, SODIUM CHLORIDE 0.9%, 3000 ML

## (undated) DEVICE — SYRINGE, 10 CC, LUER LOCK

## (undated) DEVICE — STRAP, VELCRO, BODY, 4 X 60IN, NS

## (undated) DEVICE — SYRINGE, CONTROL, ANGIOGRAPHIC, FIXED MALE LUER, 10 CC

## (undated) DEVICE — STRAP, ARM BOARD, 32 X 1.5

## (undated) DEVICE — TRAY, DRY PREP, PREMIUM

## (undated) DEVICE — NEEDLE, SPINAL, 22 G X 3.5 IN, BLACK HUB

## (undated) DEVICE — CONNECTOR, STRAIGHT, 0.375 X 0.375 IN

## (undated) DEVICE — ELECTRODE, LEEP BALL, 5MM 12CM SHAFT

## (undated) DEVICE — LUBRICANT, SURGILUBE, STERILE, 2OZ

## (undated) DEVICE — TUBING, SMOKE EVAUATION W/ATTACHED SPECULUM